# Patient Record
Sex: FEMALE | Race: WHITE | NOT HISPANIC OR LATINO | Employment: FULL TIME | URBAN - METROPOLITAN AREA
[De-identification: names, ages, dates, MRNs, and addresses within clinical notes are randomized per-mention and may not be internally consistent; named-entity substitution may affect disease eponyms.]

---

## 2017-03-20 ENCOUNTER — OFFICE VISIT (OUTPATIENT)
Dept: URGENT CARE | Facility: RETAIL CLINIC | Age: 27
End: 2017-03-20
Payer: COMMERCIAL

## 2017-03-20 VITALS
SYSTOLIC BLOOD PRESSURE: 143 MMHG | HEART RATE: 113 BPM | TEMPERATURE: 99.3 F | DIASTOLIC BLOOD PRESSURE: 96 MMHG | OXYGEN SATURATION: 100 %

## 2017-03-20 DIAGNOSIS — R03.0 ELEVATED BLOOD PRESSURE READING WITHOUT DIAGNOSIS OF HYPERTENSION: ICD-10-CM

## 2017-03-20 DIAGNOSIS — J02.9 ACUTE PHARYNGITIS, UNSPECIFIED ETIOLOGY: Primary | ICD-10-CM

## 2017-03-20 DIAGNOSIS — J02.0 STREP THROAT: ICD-10-CM

## 2017-03-20 LAB — S PYO AG THROAT QL IA.RAPID: ABNORMAL

## 2017-03-20 PROCEDURE — 99213 OFFICE O/P EST LOW 20 MIN: CPT | Performed by: NURSE PRACTITIONER

## 2017-03-20 PROCEDURE — 87880 STREP A ASSAY W/OPTIC: CPT | Mod: QW | Performed by: NURSE PRACTITIONER

## 2017-03-20 RX ORDER — PENICILLIN V POTASSIUM 500 MG/1
500 TABLET, FILM COATED ORAL 3 TIMES DAILY
Qty: 30 TABLET | Refills: 0 | Status: SHIPPED | OUTPATIENT
Start: 2017-03-20 | End: 2017-08-07

## 2017-03-20 NOTE — PROGRESS NOTES
[facility] clinic note    SUBJECTIVE:  Neo Parra is a 26 year old female who presents to [facility] clinic with chief complaint of sore throat.    Onset of symptoms was 3 day(s) ago.    Course of illness: sudden onset and worsening.    Severity moderate  Course of illness:  Current and Associated symptoms: ear pain/jaw, sore throat, post nasal drainage, hoarse voice, headache, myalgias and malaise  Treatment measures tried at home include OTC meds.  Predisposing factors include None.    Current Outpatient Prescriptions   Medication     penicillin V potassium (VEETID) 500 MG tablet     No current facility-administered medications for this visit.      PAST MEDICAL HISTORY:   Past Medical History   Diagnosis Date     NO ACTIVE PROBLEMS        PAST SURGICAL HISTORY:   Past Surgical History   Procedure Laterality Date     Hc create eardrum opening,gen anesth  1994     P.E. Tubes x 2       FAMILY HISTORY:   Family History   Problem Relation Age of Onset     Hypertension Mother        SOCIAL HISTORY:   Social History   Substance Use Topics     Smoking status: Never Smoker     Smokeless tobacco: Never Used     Alcohol use No       ROS:  Review of systems negative except as stated above.    OBJECTIVE:   Vitals:    03/20/17 1729   BP: (!) 143/96   Pulse: 113   Temp: 99.3  F (37.4  C)   TempSrc: Oral   SpO2: 100%     GENERAL APPEARANCE: alert, mild distress, moderate distress and cooperative  EYES: EOMI,  PERRL, conjunctiva clear  HENT: ear canals and TM's normal.  Nose normal.  Pharynx slightly erythematous with tonsilar hypertrophy noted.  NECK: bilateral anterior cervical adenopathy  RESP: lungs clear to auscultation - no rales, rhonchi or wheezes  CV: regular rates and rhythm, normal S1 S2, no murmur noted  ABDOMEN:  soft, nontender, no HSM or masses and bowel sounds normal  SKIN: no suspicious lesions or rashes    Rapid Strep test is positive    ASSESSMENT:     Acute  pharyngitis, unspecified etiology  Elevated blood pressure reading without diagnosis of hypertension  Strep throat      PLAN:   Outpatient Encounter Prescriptions as of 3/20/2017   Medication Sig Dispense Refill     penicillin V potassium (VEETID) 500 MG tablet Take 1 tablet (500 mg) by mouth 3 times daily 30 tablet 0     [DISCONTINUED] amoxicillin (AMOXIL) 500 MG capsule Take 1 capsule (500 mg) by mouth 3 times daily 30 capsule 0     [DISCONTINUED] norethindrone (MICRONOR) 0.35 MG per tablet Take 1 tablet (0.35 mg) by mouth daily 84 tablet 3     No facility-administered encounter medications on file as of 3/20/2017.      Blood Pressure reviewed & discussed, Neo Parra advised to F/U with PCP.  If not improving Follow up at:  [facility] [phone]  Encourage good hydration (mainly water), may drink tea /c honey, warm chicken broth to sooth throat.  Soft foods may be preferred for several days.  Symptomatic treatment with warm Na+ H2O gargles, and OTC meds as needed.   Will be contagious for 24 hours after starting antibiotic & should stay out of public settings.  The goal to minimize exposure to other people.  When given antibiotics follow the full treatment your health care provider recommends. (Finish medications even if feeling better).  Toothbrush should be replaced after 24 hours of being on antibiotic.  Also, wash anything that your mouth has been in contact with recently (water & coffee cups, etc.)    Rest as needed.  Follow-up with primary care provider if not improving or continues to have temps, greater than 48 hours after starting antibiotics.    If difficulty breathing or swallowing be seen in the ED immediately.    Kamron QUIÑONEZ, MSN, Family NP-C  [facility]

## 2017-03-20 NOTE — MR AVS SNAPSHOT
After Visit Summary   3/20/2017    Neo Parra    MRN: [id number]           Patient Information     Date Of Birth          1990        Visit Information        Provider Department      3/20/2017 5:30 PM Kamron Barnett APRN [facility]        Today's Diagnoses     Acute pharyngitis, unspecified etiology    -  1    Elevated blood pressure reading without diagnosis of hypertension        Strep throat           Follow-ups after your visit        Who to contact     You can reach your care team any time of the day by calling [phone].  Notification of test results:  If you have an abnormal lab result, we will notify you by phone as soon as possible.         Additional Information About Your Visit        MyChart Information     [employer] gives you secure access to your electronic health record. If you see a primary care provider, you can also send messages to your care team and make appointments. If you have questions, please call your primary care clinic.  If you do not have a primary care provider, please call [phone] and they will assist you.        Care EveryWhere ID     This is your Care EveryWhere ID. This could be used by other organizations to access your [place] medical records  [id number]        Your Vitals Were     Pulse Temperature Pulse Oximetry             113 99.3  F (37.4  C) (Oral) 100%          Blood Pressure from Last 3 Encounters:   03/20/17 (!) 143/96   07/12/16 116/84   04/28/16 145/90    Weight from Last 3 Encounters:   07/12/16 168 lb (76.2 kg)   04/19/16 196 lb (88.9 kg)   04/12/16 192 lb (87.1 kg)              We Performed the Following     RAPID STREP SCREEN          Today's Medication Changes          These changes are accurate as of: 3/20/17  5:53 PM.  If you have any questions, ask your nurse or doctor.               Start taking these medicines.        Dose/Directions    penicillin V potassium 500 MG tablet   Commonly known as:   VEETID   Used for:  Strep throat        Dose:  500 mg   Take 1 tablet (500 mg) by mouth 3 times daily   Quantity:  30 tablet   Refills:  0            Where to get your medicines      These medications were sent to [place], [place] - [address]  [address] , [facility] [address]     Phone:  [phone]     penicillin V potassium 500 MG tablet                Primary Care Provider Office Phone # Fax #    Melanie Lauren Bartlett [phone]       [place] [address] [place] [address]        Thank you!     Thank you for choosing [facility]  for your care. Our goal is always to provide you with excellent care. Hearing back from our patients is one way we can continue to improve our services. Please take a few minutes to complete the written survey that you may receive in the mail after your visit with us. Thank you!             Your Updated Medication List - Protect others around you: Learn how to safely use, store and throw away your medicines at www.disposemymeds.org.          This list is accurate as of: 3/20/17  5:53 PM.  Always use your most recent med list.                   Brand Name Dispense Instructions for use    penicillin V potassium 500 MG tablet    VEETID    30 tablet    Take 1 tablet (500 mg) by mouth 3 times daily

## 2017-03-20 NOTE — NURSING NOTE
"Chief Complaint   Patient presents with     Pharyngitis     x 3 days       Initial BP (!) 143/96  Pulse 113  Temp 99.3  F (37.4  C) (Oral)  SpO2 100% Estimated body mass index is 27.96 kg/(m^2) as calculated from the following:    Height as of 4/12/16: 5' 5\" (1.651 m).    Weight as of 7/12/16: 168 lb (76.2 kg).  Medication Reconciliation: complete   Madison Wyatt      "

## 2017-08-07 ENCOUNTER — OFFICE VISIT (OUTPATIENT)
Dept: FAMILY MEDICINE | Facility: CLINIC | Age: 27
End: 2017-08-07
Payer: COMMERCIAL

## 2017-08-07 VITALS
SYSTOLIC BLOOD PRESSURE: 120 MMHG | TEMPERATURE: 98.9 F | BODY MASS INDEX: 29.85 KG/M2 | DIASTOLIC BLOOD PRESSURE: 84 MMHG | HEART RATE: 132 BPM | WEIGHT: 179.4 LBS | OXYGEN SATURATION: 99 %

## 2017-08-07 DIAGNOSIS — R51.9 SCALP PAIN: Primary | ICD-10-CM

## 2017-08-07 PROCEDURE — 99213 OFFICE O/P EST LOW 20 MIN: CPT | Performed by: FAMILY MEDICINE

## 2017-08-07 ASSESSMENT — PAIN SCALES - GENERAL: PAINLEVEL: NO PAIN (0)

## 2017-08-07 NOTE — PROGRESS NOTES
SUBJECTIVE:                                                    S:  Neo Parra is a 26 year old female who presents to the clinic complaining of right sided temporal pain. The patient states that she has been experiencing sharp, stabbing pains in the right temporal area intermittently for 2 weeks. She says that this occurs randomly, and resolves 5-10 seconds after onset. The pain always occurs in the same spot, just into her hair line on the right frontal area. She rates it 7/10. She says that this pain does not develop into a headache. Patient denies any weakness, numbness, tingling, shooting pains, nausea, vomiting, slurred speech, confusion, visual changes, or other associated symptoms. Patient wears corrective lenses and last followed up with her eye doctor 2 years ago.     Problem list and histories reviewed & adjusted, as indicated.  Additional history: as documented    Patient Active Problem List   Diagnosis     CARDIOVASCULAR SCREENING; LDL GOAL LESS THAN 160     Postpartum care and examination of lactating mother     Anemia due to blood loss, acute     Past Surgical History:   Procedure Laterality Date     HC CREATE EARDRUM OPENING,GEN ANESTH  1994    P.E. Tubes x 2       Social History   Substance Use Topics     Smoking status: Never Smoker     Smokeless tobacco: Never Used     Alcohol use No     Family History   Problem Relation Age of Onset     Hypertension Mother          No current outpatient prescriptions on file.     Allergies   Allergen Reactions     Cephalosporins Hives     rash reaction from SUPRAX         Reviewed and updated as needed this visit by clinical staffTobacco  Allergies  Meds  Med Hx  Surg Hx  Fam Hx  Soc Hx      Reviewed and updated as needed this visit by Provider         ROS:  All other ROS reviewed and are negative or non-contributory except as stated in HPI.    OBJECTIVE:     /84  Pulse 132  Temp 98.9  F (37.2  C) (Temporal)  Wt 179 lb 6.4 oz (81.4 kg)  LMP  07/17/2017 (Approximate)  SpO2 99%  Breastfeeding? No  BMI 29.85 kg/m2  Body mass index is 29.85 kg/(m^2).   Vitals noted.  Patient alert, oriented, and in no acute distress.  SKIN normal. No visible scalp lesions, bruising, lumps/bumps, or other abnormalities in area of concern.   Eyes:  PERRLA, EOMI.  Fundi normal bilaterally.  Ears:  Canals clear, TM's nl bilaterally.  No erythema or fluid.  Oral:  Oropharynx nl without erythema, exudate, mass or other lesions.  Neck:  Supple without lymphadenopathy, JVD or masses.  CV:  RRR without murmur.  Respiratory:  Lungs clear to auscultation bilaterally.  Neuro exam:  CNII-XII intact.  Strength and sensation normal and symmetric in bilateral upper extremities and lower extremities.  Gait normal, including heel walk, toe walk and tandem.  No pronator drift.  Finger to nose testing with eyes closed is normal.  Normal rapid alternating movements.    Diagnostic Test Results:  No orders of the defined types were placed in this encounter.      ASSESSMENT:       ICD-10-CM    1. Scalp pain R51        PLAN:     Discussed with the patient that it is very reassuring that her neurological exam is normal, and she is not experiencing any other symptoms. Due to the quick onset and resolution of the pain, I think this is likely a nerve type pain. Encouraged the patient to go in and have an eye exam. If her eye exam is normal, I recommend that she monitor her symptoms. If she begins to experience visual changes, motor/ sensory/ coordination changes, sudden onset of emesis, or other worsening symptoms, she will notify me and I will set her up with a head CT or MRI.     This document serves as a record of services personally performed by Melanie Bartlett MD. It was created on their behalf by Shraddha Reynolds, a trained medical scribe. The creation of this record is based on the provider's personal observations and the statements of the patient. This document has been checked and  approved by the attending provider.    Melanie Bartlett MD  [facility]

## 2017-08-07 NOTE — NURSING NOTE
"Chief Complaint   Patient presents with     Headache     sharp,shooting pain top and side of head on right side 3 weeks       Initial /84  Pulse 132  Temp 98.9  F (37.2  C) (Temporal)  Wt 179 lb 6.4 oz (81.4 kg)  LMP 07/17/2017 (Approximate)  SpO2 99%  Breastfeeding? No  BMI 29.85 kg/m2 Estimated body mass index is 29.85 kg/(m^2) as calculated from the following:    Height as of 4/12/16: 5' 5\" (1.651 m).    Weight as of this encounter: 179 lb 6.4 oz (81.4 kg).  Medication Reconciliation: complete   Eva Smith CMA    "

## 2017-08-07 NOTE — MR AVS SNAPSHOT
After Visit Summary   8/7/2017    Neo Parra    MRN: [id number]           Patient Information     Date Of Birth          1990        Visit Information        Provider Department      8/7/2017 4:00 PM Melanie Bartlett MD [facility]        Today's Diagnoses     Scalp pain    -  1       Follow-ups after your visit        Who to contact     If you have questions or need follow up information about today's clinic visit or your schedule please contact [facility] directly at [phone].  Normal or non-critical lab and imaging results will be communicated to you by [employer], letter or phone within 4 business days after the clinic has received the results. If you do not hear from us within 7 days, please contact the clinic through [employer] or phone. If you have a critical or abnormal lab result, we will notify you by phone as soon as possible.  Submit refill requests through [employer] or call your pharmacy and they will forward the refill request to us. Please allow 3 business days for your refill to be completed.          Additional Information About Your Visit        MyChart Information     [employer] gives you secure access to your electronic health record. If you see a primary care provider, you can also send messages to your care team and make appointments. If you have questions, please call your primary care clinic.  If you do not have a primary care provider, please call [phone] and they will assist you.        Care EveryWhere ID     This is your Care EveryWhere ID. This could be used by other organizations to access your [place] medical records  [id number]        Your Vitals Were     Pulse Temperature Last Period Pulse Oximetry Breastfeeding? BMI (Body Mass Index)    132 98.9  F (37.2  C) (Temporal) 07/17/2017 (Approximate) 99% No 29.85 kg/m2       Blood Pressure from Last 3 Encounters:   08/07/17 120/84   03/20/17 (!) 143/96   07/12/16 116/84     Weight from Last 3 Encounters:   08/07/17 179 lb 6.4 oz (81.4 kg)   07/12/16 168 lb (76.2 kg)   04/19/16 196 lb (88.9 kg)              Today, you had the following     No orders found for display       Primary Care Provider Office Phone # Fax #    Melanie Lauren Bartlett [phone]       [facility] [address] [place] [address]        Equal Access to Services     AMANDO LOVE : Hadii aad ku hadasho Soomaali, waaxda luqadaha, qaybta kaalmada adeegyada, waxay idiin hayaan adeeg kharamikel lasupa duarte. So [facility] [phone].    ATENCIÓN: Si habla español, tiene a bright disposición servicios gratuitos de asistencia lingüística. LlSelect [facility] [place] [phone].    We comply with applicable federal civil rights laws and [place] laws. We do not discriminate on the basis of race, color, national origin, age, disability sex, sexual orientation or gender identity.            Thank you!     Thank you for choosing [facility]  for your care. Our goal is always to provide you with excellent care. Hearing back from our patients is one way we can continue to improve our services. Please take a few minutes to complete the written survey that you may receive in the mail after your visit with us. Thank you!             Your Updated Medication List - Protect others around you: Learn how to safely use, store and throw away your medicines at www.disposemymeds.org.      Notice  As of 8/7/2017  4:48 PM    You have not been prescribed any medications.

## 2017-12-11 ENCOUNTER — MYC MEDICAL ADVICE (OUTPATIENT)
Dept: FAMILY MEDICINE | Facility: CLINIC | Age: 27
End: 2017-12-11

## 2017-12-11 DIAGNOSIS — N91.4 SECONDARY OLIGOMENORRHEA: Primary | ICD-10-CM

## 2017-12-14 RX ORDER — MEDROXYPROGESTERONE ACETATE 10 MG
10 TABLET ORAL DAILY
Qty: 10 TABLET | Refills: 0 | Status: SHIPPED | OUTPATIENT
Start: 2017-12-14 | End: 2017-12-24

## 2018-01-06 ENCOUNTER — HEALTH MAINTENANCE LETTER (OUTPATIENT)
Age: 28
End: 2018-01-06

## 2018-01-16 ENCOUNTER — OFFICE VISIT (OUTPATIENT)
Dept: FAMILY MEDICINE | Facility: CLINIC | Age: 28
End: 2018-01-16
Payer: COMMERCIAL

## 2018-01-16 VITALS
SYSTOLIC BLOOD PRESSURE: 104 MMHG | HEART RATE: 70 BPM | WEIGHT: 174.6 LBS | BODY MASS INDEX: 28.06 KG/M2 | OXYGEN SATURATION: 100 % | TEMPERATURE: 98.4 F | DIASTOLIC BLOOD PRESSURE: 66 MMHG | HEIGHT: 66 IN

## 2018-01-16 DIAGNOSIS — Z23 ENCOUNTER FOR IMMUNIZATION: ICD-10-CM

## 2018-01-16 DIAGNOSIS — Z00.00 ENCOUNTER FOR ROUTINE ADULT HEALTH EXAMINATION WITHOUT ABNORMAL FINDINGS: Primary | ICD-10-CM

## 2018-01-16 DIAGNOSIS — N97.0 ANOVULATION: ICD-10-CM

## 2018-01-16 DIAGNOSIS — Z13.6 CARDIOVASCULAR SCREENING; LDL GOAL LESS THAN 160: ICD-10-CM

## 2018-01-16 DIAGNOSIS — Z12.4 SCREENING FOR MALIGNANT NEOPLASM OF CERVIX: ICD-10-CM

## 2018-01-16 LAB
CHOLEST SERPL-MCNC: 105 MG/DL
ESTRADIOL SERPL-MCNC: 94 PG/ML
HDLC SERPL-MCNC: 47 MG/DL
LDLC SERPL CALC-MCNC: 47 MG/DL
NONHDLC SERPL-MCNC: 58 MG/DL
PROGEST SERPL-MCNC: 0.9 NG/ML
PROLACTIN SERPL-MCNC: 15 UG/L (ref 3–27)
TRIGL SERPL-MCNC: 54 MG/DL
TSH SERPL DL<=0.005 MIU/L-ACNC: 1.33 MU/L (ref 0.4–4)

## 2018-01-16 PROCEDURE — 87591 N.GONORRHOEAE DNA AMP PROB: CPT | Performed by: FAMILY MEDICINE

## 2018-01-16 PROCEDURE — 84144 ASSAY OF PROGESTERONE: CPT | Performed by: FAMILY MEDICINE

## 2018-01-16 PROCEDURE — G0145 SCR C/V CYTO,THINLAYER,RESCR: HCPCS | Performed by: FAMILY MEDICINE

## 2018-01-16 PROCEDURE — 82670 ASSAY OF TOTAL ESTRADIOL: CPT | Performed by: FAMILY MEDICINE

## 2018-01-16 PROCEDURE — 99213 OFFICE O/P EST LOW 20 MIN: CPT | Mod: 25 | Performed by: FAMILY MEDICINE

## 2018-01-16 PROCEDURE — 90471 IMMUNIZATION ADMIN: CPT | Performed by: FAMILY MEDICINE

## 2018-01-16 PROCEDURE — 84146 ASSAY OF PROLACTIN: CPT | Performed by: FAMILY MEDICINE

## 2018-01-16 PROCEDURE — 87491 CHLMYD TRACH DNA AMP PROBE: CPT | Performed by: FAMILY MEDICINE

## 2018-01-16 PROCEDURE — 90632 HEPA VACCINE ADULT IM: CPT | Performed by: FAMILY MEDICINE

## 2018-01-16 PROCEDURE — 80061 LIPID PANEL: CPT | Performed by: FAMILY MEDICINE

## 2018-01-16 PROCEDURE — 36415 COLL VENOUS BLD VENIPUNCTURE: CPT | Performed by: FAMILY MEDICINE

## 2018-01-16 PROCEDURE — 84443 ASSAY THYROID STIM HORMONE: CPT | Performed by: FAMILY MEDICINE

## 2018-01-16 PROCEDURE — 99395 PREV VISIT EST AGE 18-39: CPT | Mod: 25 | Performed by: FAMILY MEDICINE

## 2018-01-16 ASSESSMENT — PAIN SCALES - GENERAL: PAINLEVEL: NO PAIN (0)

## 2018-01-16 NOTE — MR AVS SNAPSHOT
After Visit Summary   1/16/2018    Neo Parra    MRN: [id number]           Patient Information     Date Of Birth          1990        Visit Information        Provider Department      1/16/2018 1:00 PM Melanie Bartlett MD [facility]        Today's Diagnoses     Encounter for routine adult health examination without abnormal findings    -  1    Screening for malignant neoplasm of cervix        Anovulation        Need for HPV vaccine        Need for prophylactic vaccination and inoculation against influenza        CARDIOVASCULAR SCREENING; LDL GOAL LESS THAN 160        Encounter for immunization          Care Instructions      Preventive Health Recommendations  Female Ages 18 to 25     Yearly exam:     See your health care provider every year in order to  o Review health changes.   o Discuss preventive care.    o Review your medicines if your doctor has prescribed any.      You should be tested each year for STDs (sexually transmitted diseases).       After age 20, talk to your provider about how often you should have cholesterol testing.      Starting at age 21, get a Pap test every three years. If you have an abnormal result, your doctor may have you test more often.      If you are at risk for diabetes, you should have a diabetes test (fasting glucose).     Shots:     Get a flu shot each year.     Get a tetanus shot every 10 years.     Consider getting the shot (vaccine) that prevents cervical cancer (Gardasil).    Nutrition:     Eat at least 5 servings of fruits and vegetables each day.    Eat whole-grain bread, whole-wheat pasta and brown rice instead of white grains and rice.    Talk to your provider about Calcium and Vitamin D.     Lifestyle    Exercise at least 150 minutes a week each week (30 minutes a day, 5 days a week). This will help you control your weight and prevent disease.    Limit alcohol to one drink per day.    No smoking.     Wear sunscreen to  "prevent skin cancer.    See your dentist every six months for an exam and cleaning.          Follow-ups after your visit        Who to contact     If you have questions or need follow up information about today's clinic visit or your schedule please contact [facility] directly at [phone].  Normal or non-critical lab and imaging results will be communicated to you by MyChart, letter or phone within 4 business days after the clinic has received the results. If you do not hear from us within 7 days, please contact the clinic through [employer] or phone. If you have a critical or abnormal lab result, we will notify you by phone as soon as possible.  Submit refill requests through [employer] or call your pharmacy and they will forward the refill request to us. Please allow 3 business days for your refill to be completed.          Additional Information About Your Visit        MyChart Information     [employer] gives you secure access to your electronic health record. If you see a primary care provider, you can also send messages to your care team and make appointments. If you have questions, please call your primary care clinic.  If you do not have a primary care provider, please call [phone] and they will assist you.        Care EveryWhere ID     This is your Care EveryWhere ID. This could be used by other organizations to access your [place] medical records  [id number]        Your Vitals Were     Pulse Temperature Height Last Period Pulse Oximetry Breastfeeding?    70 98.4  F (36.9  C) (Temporal) 5' 6\" (1.676 m) 12/28/2017 (Exact Date) 100% No    BMI (Body Mass Index)                   28.18 kg/m2            Blood Pressure from Last 3 Encounters:   01/16/18 104/66   08/07/17 120/84   03/20/17 (!) 143/96    Weight from Last 3 Encounters:   01/16/18 174 lb 9.6 oz (79.2 kg)   08/07/17 179 lb 6.4 oz (81.4 kg)   07/12/16 168 lb (76.2 kg)              We Performed the Following     ADMIN 1st VACCINE     " Estradiol     HEPATITIS A VACCINE (ADULT)     Lipid panel reflex to direct LDL Fasting     Pap imaged thin layer screen reflex to HPV if ASCUS - recommend age 25 - 29     Progesterone     Prolactin     TSH with free T4 reflex        Primary Care Provider Office Phone # Fax #    Melanie Lauren Bartlett [phone] [address] [place] [address]        Equal Access to Services     [facility]: Hadii aad ku hadasho Soomaali, waaxda luqadaha, qaybta kaalmada adeegyada, waxay idiin hayaan adeeg kharash la'aan . So [facility] [phone].    ATENCIÓN: Si habla español, tiene a bright disposición servicios gratuitos de asistencia lingüística. Llame al [phone].    We comply with applicable federal civil rights laws and [place] laws. We do not discriminate on the basis of race, color, national origin, age, disability, sex, sexual orientation, or gender identity.            Thank you!     Thank you for choosing [facility]  for your care. Our goal is always to provide you with excellent care. Hearing back from our patients is one way we can continue to improve our services. Please take a few minutes to complete the written survey that you may receive in the mail after your visit with us. Thank you!             Your Updated Medication List - Protect others around you: Learn how to safely use, store and throw away your medicines at www.disposemymeds.org.      Notice  As of 1/16/2018  1:43 PM    You have not been prescribed any medications.

## 2018-01-16 NOTE — NURSING NOTE
Screening Questionnaire for Adult Immunization    Are you sick today?   No   Do you have allergies to medications, food, a vaccine component or latex?   Yes   Have you ever had a serious reaction after receiving a vaccination?   No   Do you have a long-term health problem with heart disease, lung disease, asthma, kidney disease, metabolic disease (e.g. diabetes), anemia, or other blood disorder?   No   Do you have cancer, leukemia, HIV/AIDS, or any other immune system problem?   No   In the past 3 months, have you taken medications that affect  your immune system, such as prednisone, other steroids, or anticancer drugs; drugs for the treatment of rheumatoid arthritis, Crohn s disease, or psoriasis; or have you had radiation treatments?   No   Have you had a seizure, or a brain or other nervous system problem?   No   During the past year, have you received a transfusion of blood or blood     products, or been given immune (gamma) globulin or antiviral drug?   No   For women: Are you pregnant or is there a chance you could become        pregnant during the next month?   No   Have you received any vaccinations in the past 4 weeks?   No     Immunization questionnaire was positive for at least one answer.  Notified provider.        Per orders of Dr. Bartlett, injection of Hep A given by Eva Smith. Patient instructed to remain in clinic for 15 minutes afterwards, and to report any adverse reaction to me immediately.       Screening performed by Eva Smith on 1/16/2018 at 1:42 PM.

## 2018-01-16 NOTE — NURSING NOTE
"Chief Complaint   Patient presents with     Physical       Initial /76  Pulse 125  Temp 98.4  F (36.9  C) (Temporal)  Ht 5' 6\" (1.676 m)  Wt 174 lb 9.6 oz (79.2 kg)  LMP 12/28/2017 (Exact Date)  SpO2 100%  Breastfeeding? No  BMI 28.18 kg/m2 Estimated body mass index is 28.18 kg/(m^2) as calculated from the following:    Height as of this encounter: 5' 6\" (1.676 m).    Weight as of this encounter: 174 lb 9.6 oz (79.2 kg).  Medication Reconciliation: complete   Eva Smith CMA    "

## 2018-01-16 NOTE — PROGRESS NOTES
SUBJECTIVE:   CC: Neo Parra is an 27 year old woman who presents for preventive health visit.     Physical   Annual:     Getting at least 3 servings of Calcium per day::  Yes    Bi-annual eye exam::  Yes    Dental care twice a year::  Yes    Sleep apnea or symptoms of sleep apnea::  None    Diet::  Regular (no restrictions)    Frequency of exercise::  4-5 days/week    Duration of exercise::  15-30 minutes    Taking medications regularly::  Yes    Medication side effects::  Not applicable    Additional concerns today::  YES              Today's PHQ-2 Score:   PHQ-2 ( 1999 [employer]) 1/9/2018   Q1: Little interest or pleasure in doing things 0   Q2: Feeling down, depressed or hopeless 0   PHQ-2 Score 0   Q1: Little interest or pleasure in doing things Not at all   Q2: Feeling down, depressed or hopeless Not at all   PHQ-2 Score 0     Answers for HPI/ROS submitted by the patient on 1/9/2018   PHQ-2 Score: 0    Abuse: Current or Past(Physical, Sexual or Emotional)- No  Do you feel safe in your environment - Yes    Social History   Substance Use Topics     Smoking status: Never Smoker     Smokeless tobacco: Never Used     Alcohol use No     Alcohol Use 1/9/2018   If you drink alcohol, do you typically have greater than 3 drinks per day OR greater than 7 drinks per week?   No   No flowsheet data found.      Reviewed orders with patient.  Reviewed health maintenance and updated orders accordingly - Yes      Mammogram not appropriate for this patient based on age.    Pertinent mammograms are reviewed under the imaging tab.  History of abnormal Pap smear: NO - age 21-29 PAP every 3 years recommended    Reviewed and updated as needed this visit by clinical staff  Tobacco  Allergies  Meds  Med Hx  Surg Hx  Fam Hx  Soc Hx        Reviewed and updated as needed this visit by Provider  Tobacco  Med Hx  Surg Hx  Fam Hx  Soc Hx         Review of Systems  C: NEGATIVE for fever, chills, unexplained change in weight.   "She did put some weight back on after stopping breastfeeding.    I: NEGATIVE for worrisome rashes, moles or lesions  E: NEGATIVE for vision changes or irritation, wears glasses.   ENT: NEGATIVE for ear, mouth and throat problems  R: NEGATIVE for significant cough or SOB  B: NEGATIVE for masses, tenderness or discharge  CV: NEGATIVE for chest pain, palpitations or peripheral edema  GI: NEGATIVE for nausea, abdominal pain, heartburn, or change in bowel habits  : NEGATIVE for unusual urinary or vaginal symptoms. Periods are very irregular.  They were spacing 40-60 days ever since her dtr was born.  She did get one on October 11, then didn't have one again and called in December and I started her on a course of Provera.  After finishing the provera, she got a period 4 days later, on 12/24. It lasted 4 days, now hasn't had another one, but it's only been 23 days.  She would like to get pregnant again.  She has never really had regular periods, but they weren't this infrequent before conceiving her dtr.  She has not used contraception since her pregnancy.  She stopped breastfeeding her daughter in late March 2017.  She has been testing for ovulation for 2 full months (long cycle) and no positive indicator for ovulation.    M: NEGATIVE for significant arthralgias or myalgia  N: NEGATIVE for weakness, dizziness or paresthesias  P: NEGATIVE for changes in mood or affect     OBJECTIVE:   /66  Pulse 70  Temp 98.4  F (36.9  C) (Temporal)  Ht 5' 6\" (1.676 m)  Wt 174 lb 9.6 oz (79.2 kg)  LMP 12/28/2017 (Exact Date)  SpO2 100%  Breastfeeding? No  BMI 28.18 kg/m2  Physical Exam     Her initial BP was 140/76 with a pulse of 125, which slowed quickly after sitting down. Recheck after appointment was 104/66 with a pulse of 70.    GENERAL: healthy, alert and no distress  EYES: Eyes grossly normal to inspection, PERRL and conjunctivae and sclerae normal  HENT: ear canals and TM's normal,  Except some chronic appearing " scarring on the bilateral TMs.   Nose and mouth without ulcers or lesions, large but uninflamed tonsils.    NECK: no adenopathy, no asymmetry, masses, or scars and thyroid generous but symmetric. No nodules.    RESP: lungs clear to auscultation - no rales, rhonchi or wheezes  BREAST: normal without masses, tenderness or nipple discharge and no palpable axillary masses or adenopathy  CV: tachycardic but regular rhythm, normal S1 S2, no S3 or S4, no murmur, click or rub, no peripheral edema and peripheral pulses strong  ABDOMEN: soft, nontender, no hepatosplenomegaly, no masses and bowel sounds normal  Vaginal exam reveals normal external and internal genitalia.  Cervix is closed, long and thick.  No lesions or abnormalities seen.  genprobe and pap collected.  Bimanual exam reveals a nontender, nongravid uterus with no CMT.  No adnexal masses or tenderness.     MS: no gross musculoskeletal defects noted, no edema  SKIN: no suspicious lesions or rashes, small oval brown flat macule between the breasts.    NEURO: Normal strength and tone, mentation intact and speech normal  PSYCH: mentation appears normal, affect normal/bright    Orders Placed This Encounter   Procedures     Pap imaged thin layer screen reflex to HPV if ASCUS - recommend age 25 - 29     Lipid panel reflex to direct LDL Fasting     TSH with free T4 reflex     Estradiol     Progesterone     Prolactin        ASSESSMENT/PLAN:       ICD-10-CM    1. Encounter for routine adult health examination without abnormal findings Z00.00 NEISSERIA GONORRHOEA PCR     CHLAMYDIA TRACHOMATIS PCR   2. Screening for malignant neoplasm of cervix Z12.4 Pap imaged thin layer screen reflex to HPV if ASCUS - recommend age 25 - 29   3. Anovulation N97.0 TSH with free T4 reflex     Estradiol     Progesterone     Prolactin   4. CARDIOVASCULAR SCREENING; LDL GOAL LESS THAN 160 Z13.6 Lipid panel reflex to direct LDL Fasting   5. Encounter for immunization Z23 HEPATITIS A VACCINE  "(ADULT)     ADMIN 1st VACCINE       COUNSELING:  Reviewed preventive health counseling, as reflected in patient instructions       Regular exercise       Healthy diet/nutrition       Immunizations    Vaccinated for: Hepatitis A and Declined: Human Papillomavirus and Influenza             Family planning    Discussed the likelihood that she is anovulatory.  Will get some labs today to check her thyroid and hormone levels and notify with results. As long as these are all ok, will wait and see if she gets another period again in the next 1-2 weeks. If not, we could repeat her course of provera, then consider clomid to induce ovulation.    If her labs are abnormal will need to address or consider referral to infertility clinic.      reports that she has never smoked. She has never used smokeless tobacco.    Estimated body mass index is 28.18 kg/(m^2) as calculated from the following:    Height as of this encounter: 5' 6\" (1.676 m).    Weight as of this encounter: 174 lb 9.6 oz (79.2 kg).   Weight management plan: Discussed healthy diet and exercise guidelines and patient will follow up in 12 months in clinic to re-evaluate.    Counseling Resources:  ATP IV Guidelines  Pooled Cohorts Equation Calculator  Breast Cancer Risk Calculator  FRAX Risk Assessment  ICSI Preventive Guidelines  Dietary Guidelines for Americans, 2010  USDA's MyPlate  ASA Prophylaxis  Lung CA Screening    Melanie Bartlett MD  [facility]  "

## 2018-01-17 LAB
C TRACH DNA SPEC QL NAA+PROBE: NEGATIVE
N GONORRHOEA DNA SPEC QL NAA+PROBE: NEGATIVE
SPECIMEN SOURCE: NORMAL
SPECIMEN SOURCE: NORMAL

## 2018-01-18 LAB
COPATH REPORT: NORMAL
PAP: NORMAL

## 2018-01-19 ENCOUNTER — MYC MEDICAL ADVICE (OUTPATIENT)
Dept: FAMILY MEDICINE | Facility: CLINIC | Age: 28
End: 2018-01-19

## 2018-01-19 DIAGNOSIS — N97.0 ANOVULATION: Primary | ICD-10-CM

## 2018-01-19 NOTE — PROGRESS NOTES
"Aylana, your hormone levels are all good. Your progesterone level is hard to interpret because it varies throughout the cycle, but overall it's fine.  Your estrogen and thyroid levels are perfect.    Your cholesterol is fine, your \"good\" HDL cholesterol is a little low and would improve with more regular aerobic exercise. But overall the numbers are good.   Melanie Bartlett MD"

## 2018-01-22 NOTE — TELEPHONE ENCOUNTER
Last visit notes:  As long as these are all ok, will wait and see if she gets another period again in the next 1-2 weeks. If not, we could repeat her course of provera, then consider clomid to induce ovulation.    If her labs are abnormal will need to address or consider referral to infertility clinic.     Please advise  Eva Smith CMA

## 2018-01-29 RX ORDER — CLOMIPHENE CITRATE 50 MG/1
50 TABLET ORAL DAILY
Qty: 5 TABLET | Refills: 2 | Status: SHIPPED | OUTPATIENT
Start: 2018-01-29 | End: 2018-02-19

## 2018-01-29 RX ORDER — MEDROXYPROGESTERONE ACETATE 10 MG
10 TABLET ORAL DAILY
Qty: 10 TABLET | Refills: 0 | Status: SHIPPED | OUTPATIENT
Start: 2018-01-29 | End: 2022-11-14

## 2018-01-29 NOTE — TELEPHONE ENCOUNTER
Spoke with Melanie Bartlett MD about patient's reply mychart message. MD will reply back to patient about her recommendations.  Eva Smith CMA

## 2018-02-13 ENCOUNTER — MYC MEDICAL ADVICE (OUTPATIENT)
Dept: FAMILY MEDICINE | Facility: CLINIC | Age: 28
End: 2018-02-13

## 2018-02-14 ENCOUNTER — ALLIED HEALTH/NURSE VISIT (OUTPATIENT)
Dept: FAMILY MEDICINE | Facility: OTHER | Age: 28
End: 2018-02-14
Payer: COMMERCIAL

## 2018-02-14 VITALS
HEIGHT: 65 IN | SYSTOLIC BLOOD PRESSURE: 130 MMHG | WEIGHT: 173.2 LBS | DIASTOLIC BLOOD PRESSURE: 78 MMHG | BODY MASS INDEX: 28.86 KG/M2

## 2018-02-14 DIAGNOSIS — N91.2 AMENORRHEA: Primary | ICD-10-CM

## 2018-02-14 LAB — BETA HCG QUAL IFA URINE: POSITIVE

## 2018-02-14 PROCEDURE — 84703 CHORIONIC GONADOTROPIN ASSAY: CPT | Performed by: FAMILY MEDICINE

## 2018-02-14 PROCEDURE — 99207 ZZC NO CHARGE NURSE ONLY: CPT

## 2018-02-14 RX ORDER — PRENATAL VIT/IRON FUM/FOLIC AC 27MG-0.8MG
1 TABLET ORAL DAILY
COMMUNITY
End: 2018-12-04

## 2018-02-14 NOTE — NURSING NOTE
Neo Parra is a 27 year old here today for a pregnancy test.  LMP: Patient's last menstrual period was 2017 (exact date).  Wt: 173 lbs 3.2 oz.    Symptoms include absence of menses and nausea &/or vomiting.    Willa informed of positive pregnancy test results. BEVERLY: 10/4/18 6 wk 6 days    Educational advice given: nutrition, smoking and drugs & alcohol.    Current medications reviewed: Yes    Previous pregnancy history remarkable for: none.    Plan: schedule appointment with OB Educator and/or OB class, follow-up appointment with Dr. Bartlett for pre-marvin care, take multivitamin or pre- vitamins and OB Education packet given.    She is to call back if she has any questions or concerns.  She is advised to notify a provider immediately if she experiences any severe cramping or abdominal pain or any vaginal bleeding.    Elijah Giron RN, BSN

## 2018-02-19 ENCOUNTER — PRENATAL OFFICE VISIT (OUTPATIENT)
Dept: FAMILY MEDICINE | Facility: CLINIC | Age: 28
End: 2018-02-19
Payer: COMMERCIAL

## 2018-02-19 VITALS — BODY MASS INDEX: 28.12 KG/M2 | HEIGHT: 66 IN | WEIGHT: 175 LBS

## 2018-02-19 DIAGNOSIS — Z32.01 PREGNANCY, CONFIRMED, NOT FIRST: Primary | ICD-10-CM

## 2018-02-19 LAB
ABO + RH BLD: NORMAL
ABO + RH BLD: NORMAL
ALBUMIN UR-MCNC: NEGATIVE MG/DL
APPEARANCE UR: CLEAR
BILIRUB UR QL STRIP: NEGATIVE
BLD GP AB SCN SERPL QL: NORMAL
BLOOD BANK CMNT PATIENT-IMP: NORMAL
COLOR UR AUTO: COLORLESS
ERYTHROCYTE [DISTWIDTH] IN BLOOD BY AUTOMATED COUNT: 13.1 % (ref 10–15)
GLUCOSE UR STRIP-MCNC: NEGATIVE MG/DL
HCT VFR BLD AUTO: 43.6 % (ref 35–47)
HGB BLD-MCNC: 14.2 G/DL (ref 11.7–15.7)
HGB UR QL STRIP: NEGATIVE
KETONES UR STRIP-MCNC: NEGATIVE MG/DL
LEUKOCYTE ESTERASE UR QL STRIP: NEGATIVE
MCH RBC QN AUTO: 28.5 PG (ref 26.5–33)
MCHC RBC AUTO-ENTMCNC: 32.6 G/DL (ref 31.5–36.5)
MCV RBC AUTO: 88 FL (ref 78–100)
NITRATE UR QL: NEGATIVE
PH UR STRIP: 6 PH (ref 5–7)
PLATELET # BLD AUTO: 274 10E9/L (ref 150–450)
RBC # BLD AUTO: 4.98 10E12/L (ref 3.8–5.2)
SOURCE: ABNORMAL
SP GR UR STRIP: 1 (ref 1–1.03)
SPECIMEN EXP DATE BLD: NORMAL
UROBILINOGEN UR STRIP-MCNC: 0 MG/DL (ref 0–2)
WBC # BLD AUTO: 13.1 10E9/L (ref 4–11)

## 2018-02-19 PROCEDURE — 86762 RUBELLA ANTIBODY: CPT | Performed by: FAMILY MEDICINE

## 2018-02-19 PROCEDURE — 86780 TREPONEMA PALLIDUM: CPT | Performed by: FAMILY MEDICINE

## 2018-02-19 PROCEDURE — 85027 COMPLETE CBC AUTOMATED: CPT | Performed by: FAMILY MEDICINE

## 2018-02-19 PROCEDURE — 87389 HIV-1 AG W/HIV-1&-2 AB AG IA: CPT | Performed by: FAMILY MEDICINE

## 2018-02-19 PROCEDURE — 99207 ZZC NO CHARGE NURSE ONLY: CPT

## 2018-02-19 PROCEDURE — 87340 HEPATITIS B SURFACE AG IA: CPT | Performed by: FAMILY MEDICINE

## 2018-02-19 PROCEDURE — 86850 RBC ANTIBODY SCREEN: CPT | Performed by: FAMILY MEDICINE

## 2018-02-19 PROCEDURE — 81003 URINALYSIS AUTO W/O SCOPE: CPT | Performed by: FAMILY MEDICINE

## 2018-02-19 PROCEDURE — 86900 BLOOD TYPING SEROLOGIC ABO: CPT | Performed by: FAMILY MEDICINE

## 2018-02-19 PROCEDURE — 36415 COLL VENOUS BLD VENIPUNCTURE: CPT | Performed by: FAMILY MEDICINE

## 2018-02-19 PROCEDURE — 86901 BLOOD TYPING SEROLOGIC RH(D): CPT | Performed by: FAMILY MEDICINE

## 2018-02-19 NOTE — PROGRESS NOTES
1. Have you had chicken pox?   Yes    Genetic Screening    Has the patient, baby's father, or anyone in either family had:     1. Thalassemia and and MCV result less than 80?No   2.  Neural tube defect? No   3.  Congenital heart defect?No   4. Down's syndrome?No   5.  George-Sachs disease?No   6. Sickle Cell disease or trait?No   7. Hemophilia or other inherited problems of blood?No   8. Muscular dystropy?No   9.  Cystic fibrosis?No  10. Bartlett's Chorea?No  11. Mental Retardation or autism?  No         If yes, was the person tested for Fragile X?   12. Any other inherited genetic or chromosomal disorders?No  13. Metabolic disorders such as diabetes or PKU?No  14. A child born with defects not listed above?No  15. Recurrent pregnancy loss or stillbirth?No  16.  Has the patient had any medications/street drugs/alcohol since her last menstrual period?No  18.  Does the patient or baby's father have any other genetic risks. No    Tushar is a twin and his sister had a twin pregnancy.

## 2018-02-19 NOTE — MR AVS SNAPSHOT
After Visit Summary   2/19/2018    Neo Parra    MRN: [id number]           Patient Information     Date Of Birth          1990        Visit Information        Provider Department      2/19/2018 12:00 PM NL OB INTAKE [facility]        Today's Diagnoses     Pregnancy, confirmed, not first    -  1       Follow-ups after your visit        Your next 10 appointments already scheduled     Feb 23, 2018  1:45 PM CST   US OB < 14 WEEKS SINGLE with PHUS1   [facility] ([facility])    [address] [place] [id number] [phone]           Please bring a list of your medicines (including vitamins, minerals and over-the-counter drugs). Also, tell your doctor about any allergies you may have. Wear comfortable clothes and leave your valuables at home.  If you re less than 20 weeks drink four 8-ounce glasses of fluid an hour before your exam. If you need to empty your bladder before your exam, try to release only a little urine. Then, drink another glass of fluid.  You may have up to two family members in the exam room. If you bring a small child, an adult must be there to care for him or her.  Please call the Imaging Department at your exam site with any questions.            Mar 09, 2018 12:45 PM CST   New Prenatal with Melanie Bartlett MD   [facility]    [address] [address] [phone]              Future tests that were ordered for you today     Open Future Orders        Priority Expected Expires Ordered    US OB < 14 Weeks Single Routine 2/19/2018 2/19/2019 2/19/2018            Who to contact     If you have questions or need follow up information about today's clinic visit or your schedule please contact [facility] directly at [phone].  Normal or non-critical lab and imaging results will be communicated to you by MyChart, letter or  "phone within 4 business days after the clinic has received the results. If you do not hear from us within 7 days, please contact the clinic through [employer] or phone. If you have a critical or abnormal lab result, we will notify you by phone as soon as possible.  Submit refill requests through [employer] or call your pharmacy and they will forward the refill request to us. Please allow 3 business days for your refill to be completed.          Additional Information About Your Visit        [employer] Information     [employer] gives you secure access to your electronic health record. If you see a primary care provider, you can also send messages to your care team and make appointments. If you have questions, please call your primary care clinic.  If you do not have a primary care provider, please call [phone] and they will assist you.        Care EveryWhere ID     This is your Care EveryWhere ID. This could be used by other organizations to access your [facility] medical records  [id number]        Your Vitals Were     Height Last Period BMI (Body Mass Index)             5' 5.5\" (1.664 m) 12/28/2017 (Approximate) 28.68 kg/m2          Blood Pressure from Last 3 Encounters:   02/14/18 130/78   01/16/18 104/66   08/07/17 120/84    Weight from Last 3 Encounters:   02/19/18 175 lb (79.4 kg)   02/14/18 173 lb 3.2 oz (78.6 kg)   01/16/18 174 lb 9.6 oz (79.2 kg)              We Performed the Following     *UA reflex to Microscopic and Culture ([place]; [facility]; [facility]; [facility]; [facility]; [facility]; [place]; [place])     ABO/Rh type and screen     Anti treponema EIA     CBC WITH PLATELETS     HEPATITIS B SURFACE ANTIGEN     HIV Antigen Antibody Combo     Rubella Antibody IgG Quantitative          Today's Medication Changes          These changes are accurate as of 2/19/18 12:39 PM.  If you have any questions, ask your nurse or doctor.               Stop taking these medicines if you haven't " already. Please contact your care team if you have questions.     clomiPHENE 50 MG tablet   Commonly known as:  CLOMID                    Primary Care Provider Office Phone # Fax #    Melanie Lauren Bartlett [phone] [address] [place] [address]        Equal Access to Services     [facility] IVAN : Hadii aad ku hadasho Soomaali, waaxda luqadaha, qaybta kaalmada adeegyada, waxay hipolitoin haymichaeln xuan santanaraphaelmikel duarte. So [facility] [phone].    ATENCIÓN: Si habla español, tiene a bright disposición servicios gratuitos de asistencia lingüística. Llame al [phone].    We comply with applicable federal civil rights laws and [place] laws. We do not discriminate on the basis of race, color, national origin, age, disability, sex, sexual orientation, or gender identity.            Thank you!     Thank you for choosing [facility]  for your care. Our goal is always to provide you with excellent care. Hearing back from our patients is one way we can continue to improve our services. Please take a few minutes to complete the written survey that you may receive in the mail after your visit with us. Thank you!             Your Updated Medication List - Protect others around you: Learn how to safely use, store and throw away your medicines at www.disposemymeds.org.          This list is accurate as of 2/19/18 12:39 PM.  Always use your most recent med list.                   Brand Name Dispense Instructions for use Diagnosis    prenatal multivitamin plus iron 27-0.8 MG Tabs per tablet      Take 1 tablet by mouth daily

## 2018-02-20 LAB
HBV SURFACE AG SERPL QL IA: NONREACTIVE
HIV 1+2 AB+HIV1 P24 AG SERPL QL IA: NONREACTIVE
RUBV IGG SERPL IA-ACNC: 16 IU/ML
T PALLIDUM IGG+IGM SER QL: NEGATIVE

## 2018-02-23 ENCOUNTER — HOSPITAL ENCOUNTER (OUTPATIENT)
Dept: ULTRASOUND IMAGING | Facility: CLINIC | Age: 28
Discharge: HOME | End: 2018-02-23
Attending: FAMILY MEDICINE | Admitting: FAMILY MEDICINE
Payer: COMMERCIAL

## 2018-02-23 DIAGNOSIS — Z32.01 PREGNANCY, CONFIRMED, NOT FIRST: ICD-10-CM

## 2018-02-23 PROCEDURE — 76801 OB US < 14 WKS SINGLE FETUS: CPT

## 2018-02-27 ENCOUNTER — MYC MEDICAL ADVICE (OUTPATIENT)
Dept: FAMILY MEDICINE | Facility: CLINIC | Age: 28
End: 2018-02-27

## 2018-02-27 NOTE — TELEPHONE ENCOUNTER
Neo Parra is a 27 year old female who calls with vomiting.  Patient reports that she is currently 8w5d pregnant.  She started vomiting last night and vomited during the night.  She reports that this morning, she is starting to feel a little better.  She denies any fevers, diarrhea, no known exposure to the flu.       NURSING ASSESSMENT:  Description:  Vomiting.   Onset/duration:  Last night.   Precip. factors:  Pregnant.   Associated symptoms:  None.   Improves/worsens symptoms:  Starting to feel better this morning.   Pain scale (0-10)   1/10  LMP/preg/breast feedinw5d  Last exam/Treatment:  2018  Allergies:   Allergies   Allergen Reactions     Cephalosporins Hives     rash reaction from SUPRAX     NURSING PLAN: Nursing advice to patient .    RECOMMENDED DISPOSITION:  Patient reports feeling better this morning.  She was encouraged to continue with small amounts of fluids, slowly increase diet to crackers or dry toast..  Educated when to seek emergent care or follow up with clinic.   Will comply with recommendation: Yes  If further questions/concerns or if symptoms do not improve, worsen or new symptoms develop, call your PCP or [facility] as soon as possible.    Guideline used:  Vomiting  Telephone Triage Protocols for Nurses, Fifth Edition, Carmella Lui RN

## 2018-03-09 ENCOUNTER — PRENATAL OFFICE VISIT (OUTPATIENT)
Dept: FAMILY MEDICINE | Facility: CLINIC | Age: 28
End: 2018-03-09
Payer: COMMERCIAL

## 2018-03-09 VITALS
WEIGHT: 175 LBS | BODY MASS INDEX: 28.68 KG/M2 | SYSTOLIC BLOOD PRESSURE: 124 MMHG | OXYGEN SATURATION: 100 % | DIASTOLIC BLOOD PRESSURE: 68 MMHG | HEART RATE: 142 BPM | TEMPERATURE: 98.7 F

## 2018-03-09 DIAGNOSIS — Z34.80 SUPERVISION OF OTHER NORMAL PREGNANCY, ANTEPARTUM: Primary | ICD-10-CM

## 2018-03-09 PROCEDURE — 99207 ZZC FIRST OB VISIT: CPT | Performed by: FAMILY MEDICINE

## 2018-03-09 PROCEDURE — 87491 CHLMYD TRACH DNA AMP PROBE: CPT | Performed by: FAMILY MEDICINE

## 2018-03-09 PROCEDURE — 87591 N.GONORRHOEAE DNA AMP PROB: CPT | Performed by: FAMILY MEDICINE

## 2018-03-09 RX ORDER — PYRIDOXINE HCL (VITAMIN B6) 50 MG
TABLET ORAL
COMMUNITY
Start: 2018-03-09 | End: 2018-05-04

## 2018-03-09 ASSESSMENT — PAIN SCALES - GENERAL: PAINLEVEL: NO PAIN (0)

## 2018-03-09 NOTE — NURSING NOTE
"Chief Complaint   Patient presents with     Prenatal Care       Initial /68  Pulse 142  Temp 98.7  F (37.1  C) (Temporal)  Wt 175 lb (79.4 kg)  LMP 12/28/2017 (Approximate)  SpO2 100%  BMI 28.68 kg/m2 Estimated body mass index is 28.68 kg/(m^2) as calculated from the following:    Height as of 2/19/18: 5' 5.5\" (1.664 m).    Weight as of this encounter: 175 lb (79.4 kg).  Medication Reconciliation: complete   Eva Smith CMA    "

## 2018-03-09 NOTE — MR AVS SNAPSHOT
After Visit Summary   3/9/2018    Neo Parra    MRN: [id number]           Patient Information     Date Of Birth          1990        Visit Information        Provider Department      3/9/2018 12:45 PM Melanie Bartlett MD [facility]        Today's Diagnoses     Supervision of other normal pregnancy, antepartum    -  1       Follow-ups after your visit        Your next 10 appointments already scheduled     Apr 06, 2018  1:30 PM CDT   ESTABLISHED PRENATAL with Melanie Bartlett MD   [facility]    [address] [place] [address]   [phone]            May 04, 2018 11:30 AM CDT   ESTABLISHED PRENATAL with Melanie Bartlett MD   [facility] ([facility])    [address] [place] [address]   [phone]            Jun 01, 2018 11:30 AM CDT   ESTABLISHED PRENATAL with Melanie Bartlett MD   [facility] ([facility]    [address] [place] [address]   [phone]            Jul 06, 2018 11:30 AM CDT   ESTABLISHED PRENATAL with Melanie Bartlett MD   [facility]    [address] [place] [address]   [phone]            Aug 03, 2018 11:30 AM CDT   ESTABLISHED PRENATAL with Melanie Bartlett MD   [facility] ([facility])    [address] [place] [address]   [phone]            Aug 17, 2018 11:30 AM CDT   ESTABLISHED PRENATAL with Melanie Bartlett MD   [facility] ([facility])    [address] [place] [address]   [phone]            Aug 31, 2018 11:30 AM CDT   ESTABLISHED PRENATAL with Melanie Bartlett MD   [facility] ([facility])    [address] [place] [address]  [address]   [phone]            Sep 14, 2018 11:30 AM CDT   ESTABLISHED PRENATAL with Melanie Bartlett MD   [facility]    [address] [place] [address]   [phone]            Sep 21, 2018 11:30 AM CDT   ESTABLISHED PRENATAL with Melanie Bartlett MD   [facility] ([address] [address]   [phone]            Sep 28, 2018 11:30 AM CDT   ESTABLISHED PRENATAL with Melanie Bartlett MD   [facility] ([address] [address] [phone]              Who to contact     If you have questions or need follow up information about today's clinic visit or your schedule please contact [facility] directly at [phone].  Normal or non-critical lab and imaging results will be communicated to you by Merchant Viewhart, letter or phone within 4 business days after the clinic has received the results. If you do not hear from us within 7 days, please contact the clinic through Email Data Sourcet or phone. If you have a critical or abnormal lab result, we will notify you by phone as soon as possible.  Submit refill requests through Viewbix or call your pharmacy and they will forward the refill request to us. Please allow 3 business days for your refill to be completed.          Additional Information About Your Visit        Viewbix Information     Viewbix gives you secure access to your electronic health record. If you see a primary care provider, you can also send messages to your care team and make appointments. If you have questions, please call your primary care clinic.  If you do not have a primary care provider, please call [phone] and they will assist you.        Care EveryWhere ID     This is your Care EveryWhere ID. This could be used by other organizations to access your [place]  medical records  [id number]        Your Vitals Were     Pulse Temperature Last Period Pulse Oximetry BMI (Body Mass Index)       142 98.7  F (37.1  C) (Temporal) 12/28/2017 (Exact Date) 100% 28.68 kg/m2        Blood Pressure from Last 3 Encounters:   03/09/18 124/68   02/14/18 130/78   01/16/18 104/66    Weight from Last 3 Encounters:   03/09/18 175 lb (79.4 kg)   02/19/18 175 lb (79.4 kg)   02/14/18 173 lb 3.2 oz (78.6 kg)              We Performed the Following     CHLAMYDIA TRACHOMATIS PCR     NEISSERIA GONORRHOEA PCR          Today's Medication Changes          These changes are accurate as of 3/9/18  1:48 PM.  If you have any questions, ask your nurse or doctor.               Start taking these medicines.        Dose/Directions    B-6 50 MG Tabs   Used for:  Supervision of other normal pregnancy, antepartum   Started by:  Melanie Bartlett MD        25 mg bid prn   Refills:  0            Where to get your medicines      Some of these will need a paper prescription and others can be bought over the counter.  Ask your nurse if you have questions.     You don't need a prescription for these medications     B-6 50 MG Tabs                Primary Care Provider Office Phone # Fax #    Melanie Bartlett [phone] [address] [place] [address]        Equal Access to Services     [facility] Center[facility] AH: Hadii garett shields hadasho Sohakeemali, waaxda luqadaha, qaybta kaalmada chapitoda, wax gary ni . So [facility] [phone].    ATENCIÓN: Si habla español, tiene a bright disposición servicios gratuitos de asistencia lingüística. Llame al [phone].    We comply with applicable federal civil rights laws and [place] laws. We do not discriminate on the basis of race, color, national origin, age, disability, sex, sexual orientation, or gender identity.            Thank you!     Thank you for choosing [facility]  for your care. Our goal is always  to provide you with excellent care. Hearing back from our patients is one way we can continue to improve our services. Please take a few minutes to complete the written survey that you may receive in the mail after your visit with us. Thank you!             Your Updated Medication List - Protect others around you: Learn how to safely use, store and throw away your medicines at www.disposemymeds.org.          This list is accurate as of 3/9/18  1:48 PM.  Always use your most recent med list.                   Brand Name Dispense Instructions for use Diagnosis    B-6 50 MG Tabs      25 mg bid prn    Supervision of other normal pregnancy, antepartum       prenatal multivitamin plus iron 27-0.8 MG Tabs per tablet      Take 1 tablet by mouth daily

## 2018-03-12 PROBLEM — Z34.80 SUPERVISION OF OTHER NORMAL PREGNANCY, ANTEPARTUM: Status: ACTIVE | Noted: 2018-03-12

## 2018-03-12 NOTE — PROGRESS NOTES
Neo Parra is a 27 year old,  female who presents by herself for 1st OB visit.  Her  Tushar is not here today.  She has talked with OB Ed prior to today's visit.  She is 9w0d by early u/s.  Her LMP date was exact but her cycles were irregular.  Her LMP date did not match with u/s date so we are going by u/s date.  She has the following concerns:  Significant nausea. No vomiting, but doesn't feel well most of hte day. She started taking vitamin B6 and it does help.  She denies bleeding or pain.  She is able to keep food and fluids down. She feels she is staying hydrated.       Past Medical History:   Diagnosis Date     NO ACTIVE PROBLEMS      I reviewed her previous OB history:  Obstetric History       T1      L1     SAB0   TAB0   Ectopic0   Multiple0   Live Births1       # Outcome Date GA Lbr Miguel Ángel/2nd Weight Sex Delivery Anes PTL Lv   2 Current            1 Term 16 39w4d 05:25 / 02:11 8 lb 5.3 oz (3.779 kg) F Vag-Spont EPI N MO      Name: Erika      Apgar1:  8                Apgar5: 9      Obstetric Comments   EDC 10/12/18 based on early us.   to Tushar.   .    Past Medical History:   Diagnosis Date     NO ACTIVE PROBLEMS       Patient Active Problem List    Diagnosis Date Noted     Anemia due to blood loss, acute 2016     Priority: Medium     CARDIOVASCULAR SCREENING; LDL GOAL LESS THAN 160 2011     Priority: Medium      O:  Vitals noted.  Patient alert, oriented, and in no acute distress.    Eyes: PERRLA, EOMI.  Ears:  Canals clear, TM's nl bilaterally.  No erythema or fluid.   Nares patent without inflammation or drainage.   Oral:  Oropharynx nl without erythema, exudate, mass or other lesions.   Neck:  Supple without lymphadenopathy, JVD or masses.  CV:  RRR without murmur.  Respiratory:  Lungs clear to auscultation bilaterally.  Breasts:  not examined today.   Abdomen:  Soft, nontender, nondistended with good bowel sounds and no masses or hepatosplenomegaly.   Uterus is not palpable is the abdomen.   Fetal heart tones were not heard as noted in OB flowsheet.    Vaginal exam reveals normal external and internal genitalia.  Cervix is closed, long and thick.  No lesions or abnormalities seen.  Bimanual exam reveals a nontender, gravid uterus consistant with 8-10 weeks with no CMT.  No adnexal masses or tenderness.   Extremities are normal without edema or lesions.    Routine labs were sent, including gen probe.    A:  First OB visit  Low risk  P:  Discussed routine pregnancy care, schedule of visits, nutrition, exercise, travel, answered questions.  Will follow up in 4 weeks or sooner with any concerns.  Will call with lab results.  Her BP was elevated on arrival, recheck after visit was normal.  We'll monitor this throughout her pregnancy. Discussed that she may use unisom in addition or call me with worsening nausea or vomiting and consider Zofran.  Discussed that FHT are not expected to be heard this early, should hear them at her next visit. She has already had an u/s that showed live SIUP.      Melanie Bartlett MD

## 2018-04-06 ENCOUNTER — PRENATAL OFFICE VISIT (OUTPATIENT)
Dept: FAMILY MEDICINE | Facility: CLINIC | Age: 28
End: 2018-04-06
Payer: COMMERCIAL

## 2018-04-06 VITALS
OXYGEN SATURATION: 100 % | BODY MASS INDEX: 28.51 KG/M2 | TEMPERATURE: 98.8 F | HEART RATE: 84 BPM | DIASTOLIC BLOOD PRESSURE: 62 MMHG | WEIGHT: 174 LBS | SYSTOLIC BLOOD PRESSURE: 132 MMHG

## 2018-04-06 DIAGNOSIS — Z34.80 SUPERVISION OF OTHER NORMAL PREGNANCY, ANTEPARTUM: Primary | ICD-10-CM

## 2018-04-06 PROCEDURE — 99207 ZZC PRENATAL VISIT: CPT | Performed by: FAMILY MEDICINE

## 2018-04-06 ASSESSMENT — PAIN SCALES - GENERAL: PAINLEVEL: NO PAIN (0)

## 2018-04-06 NOTE — PROGRESS NOTES
Here with Tushar.   Doing well overall.  No bleeding, no regular ctx. Overall her nausea is starting to get better. Still using B6, didn't take Unisom or Zofran.   Staying hydrated.   Discussed quickening.   Discussed afp, will consider for next visit.   Will plan routine sono at 20 weeks, ordered.   Will f/u in 4 weeks, sooner prn.   Melanie Bartlett MD

## 2018-04-06 NOTE — MR AVS SNAPSHOT
After Visit Summary   4/6/2018    Neo Parra    MRN: [id number]           Patient Information     Date Of Birth          1990        Visit Information        Provider Department      4/6/2018 1:30 PM Melanie Bartlett MD [facility]        Today's Diagnoses     Supervision of other normal pregnancy, antepartum    -  1       Follow-ups after your visit        Your next 10 appointments already scheduled     May 04, 2018 11:30 AM CDT   ESTABLISHED PRENATAL with Melanie Bartlett MD   [facility]    [address] [place] [address]   [phone]            Jun 01, 2018 11:30 AM CDT   ESTABLISHED PRENATAL with Melanie Bartlett MD   [facility]    [address] [place] [address]   [phone]            Jul 06, 2018 11:30 AM CDT   ESTABLISHED PRENATAL with Melanie Bartlett MD   [facility]    [address] [place] [address]   [phone]            Aug 03, 2018 11:30 AM CDT   ESTABLISHED PRENATAL with Melanie Bartlett MD   [facility]    [address] [place] [address]   [phone]            Aug 17, 2018 11:30 AM CDT   ESTABLISHED PRENATAL with Melanie Bartlett MD   [facility]    [address] [place] [address]   [phone]            Aug 31, 2018 11:30 AM CDT   ESTABLISHED PRENATAL with Melanie Bartlett MD   [facility])    [address] [place] [address]   [phone]            Sep 14, 2018 11:30 AM CDT   ESTABLISHED PRENATAL with Melanie Bartlett MD   [facility] ([facility])    [address] [place] [address]  [address]   [phone]            Sep 21, 2018 11:30 AM CDT   ESTABLISHED PRENATAL with Melanie Bartlett MD   [facility]    [address] [place] [address]   [phone]            Sep 28, 2018 11:30 AM CDT   ESTABLISHED PRENATAL with Melanie Bartlett MD   [facility] ([address] [address]   [phone]            Oct 05, 2018 11:30 AM CDT   ESTABLISHED PRENATAL with Melanie Bartlett MD   [facility] ([address] [address]   [phone]              Future tests that were ordered for you today     Open Future Orders        Priority Expected Expires Ordered    US OB > 14 Weeks Complete Single Routine 5/25/2018 7/6/2018 4/6/2018            Who to contact     If you have questions or need follow up information about today's clinic visit or your schedule please contact [facility] directly at [phone].  Normal or non-critical lab and imaging results will be communicated to you by MyChart, letter or phone within 4 business days after the clinic has received the results. If you do not hear from us within 7 days, please contact the clinic through Acacia Interactivehart or phone. If you have a critical or abnormal lab result, we will notify you by phone as soon as possible.  Submit refill requests through Kanmu or call your pharmacy and they will forward the refill request to us. Please allow 3 business days for your refill to be completed.          Additional Information About Your Visit        Acacia InteractiveharCloudmark Information     Kanmu gives you secure access to your electronic health record. If you see a primary care provider, you can also send messages to your care team and make appointments. If you have questions, please call your primary care clinic.  If you do not have a primary care  provider, please call [phone] and they will assist you.        Care EveryWhere ID     This is your Care EveryWhere ID. This could be used by other organizations to access your [place] medical records  [id number]        Your Vitals Were     Pulse Temperature Last Period Pulse Oximetry BMI (Body Mass Index)       84 98.8  F (37.1  C) (Temporal) 12/24/2017 100% 28.51 kg/m2        Blood Pressure from Last 3 Encounters:   04/06/18 132/62   03/09/18 124/68   02/14/18 130/78    Weight from Last 3 Encounters:   04/06/18 174 lb (78.9 kg)   03/09/18 175 lb (79.4 kg)   02/19/18 175 lb (79.4 kg)               Primary Care Provider Office Phone # Fax #    Melanie Lauren Bartlett [phone] [address] [place] [address]        Equal Access to Services     RIA LOVE AH: Hadii garett ku hadasho Soomaali, waaxda luqadaha, qaybta kaalmada adeegyada, waxay hipolitoin hayvonda ni . So [facility] [phone].    ATENCIÓN: Si habla español, tiene a bright disposición servicios gratuitos de asistencia lingüística. Llame al [phone].    We comply with applicable federal civil rights laws and [place] laws. We do not discriminate on the basis of race, color, national origin, age, disability, sex, sexual orientation, or gender identity.            Thank you!     Thank you for choosing [facility]  for your care. Our goal is always to provide you with excellent care. Hearing back from our patients is one way we can continue to improve our services. Please take a few minutes to complete the written survey that you may receive in the mail after your visit with us. Thank you!             Your Updated Medication List - Protect others around you: Learn how to safely use, store and throw away your medicines at www.disposemymeds.org.          This list is accurate as of 4/6/18  1:59 PM.  Always use your most recent med list.                   Brand Name Dispense Instructions for use  Diagnosis    B-6 50 MG Tabs      25 mg bid prn    Supervision of other normal pregnancy, antepartum       prenatal multivitamin plus iron 27-0.8 MG Tabs per tablet      Take 1 tablet by mouth daily

## 2018-04-06 NOTE — NURSING NOTE
"Chief Complaint   Patient presents with     Prenatal Care     nausea       Initial /62  Pulse 84  Temp 98.8  F (37.1  C) (Temporal)  Wt 174 lb (78.9 kg)  LMP 12/24/2017  SpO2 100%  BMI 28.51 kg/m2 Estimated body mass index is 28.51 kg/(m^2) as calculated from the following:    Height as of 2/19/18: 5' 5.5\" (1.664 m).    Weight as of this encounter: 174 lb (78.9 kg).  Medication Reconciliation: complete   Eva Smith CMA    "

## 2018-05-03 ENCOUNTER — MYC MEDICAL ADVICE (OUTPATIENT)
Dept: FAMILY MEDICINE | Facility: CLINIC | Age: 28
End: 2018-05-03

## 2018-05-03 NOTE — TELEPHONE ENCOUNTER
Patient is asked further questions via Grubster, as she has an appointment tomorrow, she is given home care measures for aches and pains and asked questions via Joule Unlimitedt.    Will wait for response.  TONY AlvarezN, RN

## 2018-05-04 ENCOUNTER — PRENATAL OFFICE VISIT (OUTPATIENT)
Dept: FAMILY MEDICINE | Facility: CLINIC | Age: 28
End: 2018-05-04
Payer: COMMERCIAL

## 2018-05-04 VITALS
WEIGHT: 180 LBS | HEART RATE: 120 BPM | OXYGEN SATURATION: 100 % | BODY MASS INDEX: 29.5 KG/M2 | SYSTOLIC BLOOD PRESSURE: 118 MMHG | DIASTOLIC BLOOD PRESSURE: 70 MMHG | TEMPERATURE: 98.3 F

## 2018-05-04 DIAGNOSIS — Z34.82 ENCOUNTER FOR SUPERVISION OF OTHER NORMAL PREGNANCY IN SECOND TRIMESTER: Primary | ICD-10-CM

## 2018-05-04 PROCEDURE — 99207 ZZC PRENATAL VISIT: CPT | Performed by: FAMILY MEDICINE

## 2018-05-04 ASSESSMENT — PAIN SCALES - GENERAL: PAINLEVEL: NO PAIN (0)

## 2018-05-04 NOTE — MR AVS SNAPSHOT
After Visit Summary   5/4/2018    Neo Parra    MRN: [id number]           Patient Information     Date Of Birth          1990        Visit Information        Provider Department      5/4/2018 11:30 AM Melanie Bartlett MD [facility]         Follow-ups after your visit        Your next 10 appointments already scheduled     May 25, 2018  2:00 PM CDT   US OB > 14 WEEKS COMPLETE SINGLE with PHUS1   [facility] ([facility])    [address] [place] [address]   [phone]           Please bring a list of your medicines (including vitamins, minerals and over-the-counter drugs). Also, tell your doctor about any allergies you may have. Wear comfortable clothes and leave your valuables at home.  If you re less than 20 weeks drink four 8-ounce glasses of fluid an hour before your exam. If you need to empty your bladder before your exam, try to release only a little urine. Then, drink another glass of fluid.  You may have up to two family members in the exam room. If you bring a small child, an adult must be there to care for him or her.  Please call the Imaging Department at your exam site with any questions.            Jun 01, 2018 11:30 AM CDT   ESTABLISHED PRENATAL with Melanie Bartlett MD   [facility]    [address] [place] [address]   [phone]            Jul 06, 2018 11:30 AM CDT   ESTABLISHED PRENATAL with Melanie Bartlett MD   [facility] ([facility])    [address] [place] [address]   [phone]            Aug 03, 2018 11:30 AM CDT   ESTABLISHED PRENATAL with Melanie Bartlett MD   [facility] ([facility])    [address] [place] [address]   [phone]            Aug 17, 2018 11:30 AM CDT   ESTABLISHED PRENATAL with Melanie  Lauren Bartlett MD   [facility]    [address] [place] [address]   [phone]            Aug 31, 2018 11:30 AM CDT   ESTABLISHED PRENATAL with Melanie Bartlett MD   [facility]    [address] [place] [address]   [phone]            Sep 14, 2018 11:30 AM CDT   ESTABLISHED PRENATAL with Melanie Bartlett MD   [facility]    [address] [place] [address]   [phone]            Sep 21, 2018 11:30 AM CDT   ESTABLISHED PRENATAL with Melanie Bartlett MD   [facility]    [address] [place] [address]   [phone]            Sep 28, 2018 11:30 AM CDT   ESTABLISHED PRENATAL with Melanie Bartlett MD   [facility]    [address] [place] [address]   [phone]            Oct 05, 2018 11:30 AM CDT   ESTABLISHED PRENATAL with Melanie Bartlett MD   [facility]    [address] [place] [address]   [phone]              Who to contact     If you have questions or need follow up information about today's clinic visit or your schedule please contact [facility] directly at [phone].  Normal or non-critical lab and imaging results will be communicated to you by MyChart, letter or phone within 4 business days after the clinic has received the results. If you do not hear from us within 7 days, please contact the clinic through MyChart or phone. If you have a critical or abnormal lab result, we will notify you by phone as soon as possible.  Submit refill requests through Figma or call your pharmacy and they will forward the refill request to us. Please allow 3 business days for your refill to  be completed.          Additional Information About Your Visit        Warwick Audio Technologieshart Information     [employer] gives you secure access to your electronic health record. If you see a primary care provider, you can also send messages to your care team and make appointments. If you have questions, please call your primary care clinic.  If you do not have a primary care provider, please call [phone] and they will assist you.        Care EveryWhere ID     This is your Care EveryWhere ID. This could be used by other organizations to access your [place] medical records  [id number]        Your Vitals Were     Pulse Temperature Last Period Pulse Oximetry BMI (Body Mass Index)       120 98.3  F (36.8  C) (Temporal) 12/24/2017 100% 29.5 kg/m2        Blood Pressure from Last 3 Encounters:   05/04/18 118/70   04/06/18 132/62   03/09/18 124/68    Weight from Last 3 Encounters:   05/04/18 180 lb (81.6 kg)   04/06/18 174 lb (78.9 kg)   03/09/18 175 lb (79.4 kg)              Today, you had the following     No orders found for display       Primary Care Provider Office Phone # Fax #    Melanie Lauren Bartlett [phone] [address] [place] [address]        Equal Access to Services     RIA LOVE : Hadii garett ku hadasho Soomaali, waaxda luqadaha, qaybta kaalmada adeegyada, waxay idiin haymichaeln xuan shaver lasupa duarte. So [facility] [phone].    ATENCIÓN: Si habla español, tiene a bright disposición servicios gratuitos de asistencia lingüística. Llame al [phone].    We comply with applicable federal civil rights laws and [place] laws. We do not discriminate on the basis of race, color, national origin, age, disability, sex, sexual orientation, or gender identity.            Thank you!     Thank you for choosing [facility]  for your care. Our goal is always to provide you with excellent care. Hearing back from our patients is one way we can continue to improve our services. Please take  a few minutes to complete the written survey that you may receive in the mail after your visit with us. Thank you!             Your Updated Medication List - Protect others around you: Learn how to safely use, store and throw away your medicines at www.disposemymeds.org.          This list is accurate as of 5/4/18 12:20 PM.  Always use your most recent med list.                   Brand Name Dispense Instructions for use Diagnosis    prenatal multivitamin plus iron 27-0.8 MG Tabs per tablet      Take 1 tablet by mouth daily

## 2018-05-04 NOTE — PROGRESS NOTES
"Doing well overall.  No bleeding, no regular ctx- Occasionally have been having pain right around where she feels her left ovary is, off and on over the last two days.  It is like a sharp stabbing pain that comes and goes. \"I used to get the same kind of thing when I would get my period\", but no bleeding. Not sure if baby movement or anything to be concerned about.   Discussed possible causes of pain, ovarian cyst, GI, muscle/ligament \"growing pains\", infection.  Since her exam is unremarkable and the pain is better, will just monitor for now.   No headaches, dizziness, visual blurriness, nausea, emesis, or other associated symptoms.   20 Week Ultrasound scheduled.   Patient Declined AFP screening.  Discussed quickening.    Follow up in 4 weeks or sooner if needed.    Melanie Bartlett MD   "

## 2018-05-25 ENCOUNTER — HOSPITAL ENCOUNTER (OUTPATIENT)
Dept: ULTRASOUND IMAGING | Facility: CLINIC | Age: 28
Discharge: HOME | End: 2018-05-25
Attending: FAMILY MEDICINE | Admitting: FAMILY MEDICINE
Payer: COMMERCIAL

## 2018-05-25 DIAGNOSIS — Z34.80 SUPERVISION OF OTHER NORMAL PREGNANCY, ANTEPARTUM: ICD-10-CM

## 2018-05-25 PROCEDURE — 76805 OB US >/= 14 WKS SNGL FETUS: CPT

## 2018-05-29 ENCOUNTER — TELEPHONE (OUTPATIENT)
Dept: FAMILY MEDICINE | Facility: CLINIC | Age: 28
End: 2018-05-29

## 2018-05-29 DIAGNOSIS — Z34.80 SUPERVISION OF OTHER NORMAL PREGNANCY, ANTEPARTUM: Primary | ICD-10-CM

## 2018-05-29 NOTE — TELEPHONE ENCOUNTER
Order placed, left msg for pt to call clinic back. Please help schedule US appt. Will send Foodzaihart msg as well. Magalie Estrada, CMA

## 2018-05-29 NOTE — TELEPHONE ENCOUNTER
----- Message from Melanie Bartlett MD sent at 5/25/2018  3:56 PM CDT -----  See message to patient. Please arrange follow up sono in 1-2 weeks.   Melanie Bartlett MD

## 2018-05-30 ENCOUNTER — PRENATAL OFFICE VISIT (OUTPATIENT)
Dept: FAMILY MEDICINE | Facility: CLINIC | Age: 28
End: 2018-05-30
Payer: COMMERCIAL

## 2018-05-30 VITALS
WEIGHT: 181.4 LBS | HEART RATE: 128 BPM | OXYGEN SATURATION: 100 % | TEMPERATURE: 98.1 F | DIASTOLIC BLOOD PRESSURE: 66 MMHG | SYSTOLIC BLOOD PRESSURE: 124 MMHG | BODY MASS INDEX: 29.73 KG/M2

## 2018-05-30 DIAGNOSIS — Z34.80 SUPERVISION OF OTHER NORMAL PREGNANCY, ANTEPARTUM: Primary | ICD-10-CM

## 2018-05-30 PROCEDURE — 99207 ZZC PRENATAL VISIT: CPT | Performed by: FAMILY MEDICINE

## 2018-05-30 ASSESSMENT — PAIN SCALES - GENERAL: PAINLEVEL: NO PAIN (0)

## 2018-05-30 NOTE — MR AVS SNAPSHOT
After Visit Summary   5/30/2018    Neo Parra    MRN: [id number]           Patient Information     Date Of Birth          1990        Visit Information        Provider Department      5/30/2018 11:30 AM Melanie Bartlett MD [facility]        Today's Diagnoses     Supervision of other normal pregnancy, antepartum    -  1       Follow-ups after your visit        Your next 10 appointments already scheduled     Jun 08, 2018  9:15 AM CDT   US OB SINGLE FOLLOW UP REPEAT with PHUS1   [facility]trasound ([facility])    [address] [place] [address]   [phone]           Please bring a list of your medicines (including vitamins, minerals and over-the-counter drugs). Also, tell your doctor about any allergies you may have. Wear comfortable clothes and leave your valuables at home.  If you re less than 20 weeks drink four 8-ounce glasses of fluid an hour before your exam. If you need to empty your bladder before your exam, try to release only a little urine. Then, drink another glass of fluid.  You may have up to two family members in the exam room. If you bring a small child, an adult must be there to care for him or her.  Please call the Imaging Department at your exam site with any questions.            Jul 06, 2018 11:30 AM CDT   ESTABLISHED PRENATAL with Melanie Bartlett MD   [facility] ([address] [address]   [phone]            Aug 03, 2018 11:30 AM CDT   ESTABLISHED PRENATAL with Melanie Bartlett MD   [facility] ([facility])    [address] [place] [address]   [phone]            Aug 17, 2018 11:30 AM CDT   ESTABLISHED PRENATAL with Melanie Bartlett MD   [facility] ([facility])    [address] [place] [address]    [phone]            Aug 31, 2018 11:30 AM CDT   ESTABLISHED PRENATAL with Melanie Bartlett MD   [facility] ([facility])    [address] [place] [address]   [phone]            Sep 14, 2018 11:30 AM CDT   ESTABLISHED PRENATAL with Melanie Bartlett MD   [facility] ([facility])    [address] [place] [address]   [phone]            Sep 21, 2018 11:30 AM CDT   ESTABLISHED PRENATAL with Melanie Bartlett MD   [facility] ([facility])    [address] [place] [address]   [phone]            Sep 28, 2018 11:30 AM CDT   ESTABLISHED PRENATAL with Melanie Bartlett MD   [facility] ([facility])    [address] [place] [address]   [phone]            Oct 05, 2018 11:30 AM CDT   ESTABLISHED PRENATAL with Melanie Bartlett MD   [facility] ([facility])    [address] [place] [address]   [phone]            Oct 12, 2018 11:30 AM CDT   ESTABLISHED PRENATAL with Melanie Bartlett MD   [facility] ([facility])    [address] [place] [address]   [phone]              Future tests that were ordered for you today     Open Future Orders        Priority Expected Expires Ordered    OB hemoglobin Routine 6/30/2018 8/30/2018 5/30/2018    Glucose tolerance, gest screen, 1 hour Routine 6/30/2018 8/30/2018 5/30/2018    Treponema Abs w Reflex to RPR and Titer Routine 6/30/2018 8/30/2018 5/30/2018    US OB Ltd One Or More Fetus FU/Repeat Routine  5/29/2019 5/29/2018            Who to contact     If you have questions or need follow up information about today's clinic visit or your schedule please contact [facility] directly at [phone].  Normal or non-critical  lab and imaging results will be communicated to you by MyChart, letter or phone within 4 business days after the clinic has received the results. If you do not hear from us within 7 days, please contact the clinic through [employer] or phone. If you have a critical or abnormal lab result, we will notify you by phone as soon as possible.  Submit refill requests through "[employer] or call your pharmacy and they will forward the refill request to us. Please allow 3 business days for your refill to be completed.          Additional Information About Your Visit        [employer] Information     "[employer] gives you secure access to your electronic health record. If you see a primary care provider, you can also send messages to your care team and make appointments. If you have questions, please call your primary care clinic.  If you do not have a primary care provider, please call [phone] and they will assist you.        Care EveryWhere ID     This is your Care EveryWhere ID. This could be used by other organizations to access your [place] medical records  [id number]        Your Vitals Were     Pulse Temperature Last Period Pulse Oximetry BMI (Body Mass Index)       128 98.1  F (36.7  C) (Temporal) 12/24/2017 100% 29.73 kg/m2        Blood Pressure from Last 3 Encounters:   05/30/18 124/66   05/04/18 118/70   04/06/18 132/62    Weight from Last 3 Encounters:   05/30/18 181 lb 6.4 oz (82.3 kg)   05/04/18 180 lb (81.6 kg)   04/06/18 174 lb (78.9 kg)               Primary Care Provider Office Phone # Fax #    Melanie Lauren Bartlett [phone]       [address] [place] [address]        Equal Access to Services     [facility]: Hadii aad ku hadasho Soomaali, waaxda luqadaha, qaybta kaalmada adeegyada, clarissa ni . So [facility] [phone].    ATENCIÓN: Si habla español, tiene a bright disposición servicios gratuitos de asistencia lingüística. Llame al [phone].    We comply  with applicable federal civil rights laws and [place] laws. We do not discriminate on the basis of race, color, national origin, age, disability, sex, sexual orientation, or gender identity.            Thank you!     Thank you for choosing [facility]  for your care. Our goal is always to provide you with excellent care. Hearing back from our patients is one way we can continue to improve our services. Please take a few minutes to complete the written survey that you may receive in the mail after your visit with us. Thank you!             Your Updated Medication List - Protect others around you: Learn how to safely use, store and throw away your medicines at www.disposemymeds.org.          This list is accurate as of 5/30/18 12:17 PM.  Always use your most recent med list.                   Brand Name Dispense Instructions for use Diagnosis    prenatal multivitamin plus iron 27-0.8 MG Tabs per tablet      Take 1 tablet by mouth daily

## 2018-05-30 NOTE — PROGRESS NOTES
Doing well overall.  No bleeding, no pain or concerns.   No headaches, dizziness, visual blurriness, nausea, emesis, or other associated symptoms.   She is feeling fetal movement.  Discussed fetal kick counts at 22 weeks.  1 hour GTT, RPR, Hgb at next visit  Discussed  labor.  Couldn't see 4 chamber heart on ultrasound so she has a repeat next Friday.  Follow up in 4 weeks or sooner if needed.  Melanie Bartlett MD

## 2018-06-08 ENCOUNTER — HOSPITAL ENCOUNTER (OUTPATIENT)
Dept: ULTRASOUND IMAGING | Facility: CLINIC | Age: 28
Discharge: HOME | End: 2018-06-08
Attending: FAMILY MEDICINE | Admitting: FAMILY MEDICINE
Payer: COMMERCIAL

## 2018-06-08 DIAGNOSIS — Z34.80 SUPERVISION OF OTHER NORMAL PREGNANCY, ANTEPARTUM: ICD-10-CM

## 2018-06-08 PROCEDURE — 76816 OB US FOLLOW-UP PER FETUS: CPT

## 2018-06-15 ENCOUNTER — MEDICAL CORRESPONDENCE (OUTPATIENT)
Dept: HEALTH INFORMATION MANAGEMENT | Facility: CLINIC | Age: 28
End: 2018-06-15

## 2018-07-06 ENCOUNTER — PRENATAL OFFICE VISIT (OUTPATIENT)
Dept: FAMILY MEDICINE | Facility: CLINIC | Age: 28
End: 2018-07-06
Payer: COMMERCIAL

## 2018-07-06 VITALS
TEMPERATURE: 98 F | HEART RATE: 131 BPM | BODY MASS INDEX: 30.61 KG/M2 | SYSTOLIC BLOOD PRESSURE: 128 MMHG | DIASTOLIC BLOOD PRESSURE: 72 MMHG | WEIGHT: 186.8 LBS | OXYGEN SATURATION: 100 %

## 2018-07-06 DIAGNOSIS — Z34.80 SUPERVISION OF OTHER NORMAL PREGNANCY, ANTEPARTUM: Primary | ICD-10-CM

## 2018-07-06 DIAGNOSIS — O99.810 ABNORMAL MATERNAL GLUCOSE TOLERANCE, ANTEPARTUM: ICD-10-CM

## 2018-07-06 LAB
GLUCOSE 1H P 50 G GLC PO SERPL-MCNC: 151 MG/DL (ref 60–129)
HGB BLD-MCNC: 12.2 G/DL (ref 11.7–15.7)

## 2018-07-06 PROCEDURE — 36415 COLL VENOUS BLD VENIPUNCTURE: CPT | Performed by: FAMILY MEDICINE

## 2018-07-06 PROCEDURE — 82950 GLUCOSE TEST: CPT | Performed by: FAMILY MEDICINE

## 2018-07-06 PROCEDURE — 99207 ZZC PRENATAL VISIT: CPT | Performed by: FAMILY MEDICINE

## 2018-07-06 PROCEDURE — 00000218 ZZHCL STATISTIC OBHBG - HEMOGLOBIN: Performed by: FAMILY MEDICINE

## 2018-07-06 PROCEDURE — 86780 TREPONEMA PALLIDUM: CPT | Performed by: FAMILY MEDICINE

## 2018-07-06 ASSESSMENT — PAIN SCALES - GENERAL: PAINLEVEL: NO PAIN (0)

## 2018-07-06 NOTE — MR AVS SNAPSHOT
After Visit Summary   7/6/2018    Neo Parra    MRN: [id number]           Patient Information     Date Of Birth          1990        Visit Information        Provider Department      7/6/2018 11:30 AM Melanie Bartlett MD [facility]        Today's Diagnoses     Supervision of other normal pregnancy, antepartum           Follow-ups after your visit        Your next 10 appointments already scheduled     Aug 03, 2018 11:30 AM CDT   ESTABLISHED PRENATAL with Melanie Bartlett MD   [facility] ([facility])    [address] [place] [address]   [phone]            Aug 17, 2018 11:30 AM CDT   ESTABLISHED PRENATAL with Melanie Bartlett MD   [facility] ([facility])    [address] [place] [address]   [phone]            Aug 31, 2018 11:30 AM CDT   ESTABLISHED PRENATAL with Melanie Bartlett MD   [facility] ([facility])    [address] [place] [address]   [phone]            Sep 14, 2018 11:30 AM CDT   ESTABLISHED PRENATAL with Melanie Bartlett MD   [facility] ([facility])    [address] [place] [address]   [phone]            Sep 21, 2018 11:30 AM CDT   ESTABLISHED PRENATAL with Melanie Bartlett MD   [facility] ([facility])    [address] [place] [address]   [phone]            Sep 28, 2018 11:30 AM CDT   ESTABLISHED PRENATAL with Melanie Bartlett MD   [facility] ([facility])    [address] [place] [address]   [phone]            Oct 05, 2018 11:30 AM CDT   ESTABLISHED PRENATAL with Melanie Bartlett MD   [facility] ([facility])    [address] [place] [address]  [address]   [phone]            Oct 12, 2018 11:30 AM CDT   ESTABLISHED PRENATAL with Melanie Bartlett MD   [facility] ([address] [phone] [phone]            Oct 19, 2018 11:30 AM CDT   ESTABLISHED PRENATAL with Melanie Bartlett MD   [facility] ([address] [phone] [phone]              Who to contact     If you have questions or need follow up information about today's clinic visit or your schedule please contact [facility] directly at [phone].  Normal or non-critical lab and imaging results will be communicated to you by MyChart, letter or phone within 4 business days after the clinic has received the results. If you do not hear from us within 7 days, please contact the clinic through [employer] or phone. If you have a critical or abnormal lab result, we will notify you by phone as soon as possible.  Submit refill requests through [employer] or call your pharmacy and they will forward the refill request to us. Please allow 3 business days for your refill to be completed.          Additional Information About Your Visit        [employer] Information     [employer] gives you secure access to your electronic health record. If you see a primary care provider, you can also send messages to your care team and make appointments. If you have questions, please call your primary care clinic.  If you do not have a primary care provider, please call [phone] and they will assist you.        Care EveryWhere ID     This is your Care EveryWhere ID. This could be used by other organizations to access your [place] medical records  [id number]        Your Vitals Were     Pulse Temperature Last Period Pulse Oximetry BMI (Body Mass Index)       131 98  F (36.7  C) (Temporal) 12/24/2017 100% 30.61 kg/m2        Blood Pressure from Last 3  Encounters:   07/06/18 128/72   05/30/18 124/66   05/04/18 118/70    Weight from Last 3 Encounters:   07/06/18 186 lb 12.8 oz (84.7 kg)   05/30/18 181 lb 6.4 oz (82.3 kg)   05/04/18 180 lb (81.6 kg)              We Performed the Following     Glucose tolerance, gest screen, 1 hour     OB hemoglobin     Treponema Abs w Reflex to RPR and Titer        Primary Care Provider Office Phone # Fax #    Melanie Lauren Bartlett [phone] [address] [place] [address]        Equal Access to Services     [facility] : Hadii garett Estrella, waaxda lulizbethadaha, qaybta kaalmada manasa, clarissa duarte. So [facility] [phone].    ATENCIÓN: Si habla español, tiene a bright disposición servicios gratuitos de asistencia lingüística. Llame al [phone].    We comply with applicable federal civil rights laws and [place] laws. We do not discriminate on the basis of race, color, national origin, age, disability, sex, sexual orientation, or gender identity.            Thank you!     Thank you for choosing [facility]  for your care. Our goal is always to provide you with excellent care. Hearing back from our patients is one way we can continue to improve our services. Please take a few minutes to complete the written survey that you may receive in the mail after your visit with us. Thank you!             Your Updated Medication List - Protect others around you: Learn how to safely use, store and throw away your medicines at www.disposemymeds.org.          This list is accurate as of 7/6/18  1:21 PM.  Always use your most recent med list.                   Brand Name Dispense Instructions for use Diagnosis    prenatal multivitamin plus iron 27-0.8 MG Tabs per tablet      Take 1 tablet by mouth daily

## 2018-07-06 NOTE — PROGRESS NOTES
Doing well overall.  No bleeding, no regular ctx. Just having a lot of pubic symphysis pain.  Discussed using maternity support belt, warm baths, ice packs, etc.   1 hour GTT, Hgb, and RPR pending.   Will offer tdap next visit in 4 weeks.   Discussed  labor.   Discussed warning signs for preeclampsia.  Will f/u sooner with any concern for decreased fetal movement, vaginal bleeding, fluid leak, headache, vision change, severe nausea or vomiting, abdominal pain, increased edema or other concerns.   Melanie Bartlett MD

## 2018-07-06 NOTE — PROGRESS NOTES
Neo, unfortunately you did NOT pass your diabetes test.  I'd like you to complete the 3 hour test, so I'll have one of my team assistants call you to set this up. Your blood count is fine, you are NOT anemic.   Melanie Bartlett MD

## 2018-07-07 LAB — T PALLIDUM AB SER QL: NONREACTIVE

## 2018-07-09 DIAGNOSIS — O99.810 ABNORMAL MATERNAL GLUCOSE TOLERANCE, ANTEPARTUM: ICD-10-CM

## 2018-07-09 LAB
GLUCOSE 1H P 100 G GLC PO SERPL-MCNC: 122 MG/DL (ref 60–179)
GLUCOSE 2H P 100 G GLC PO SERPL-MCNC: 104 MG/DL (ref 60–154)
GLUCOSE 3H P 100 G GLC PO SERPL-MCNC: 107 MG/DL (ref 60–139)
GLUCOSE P FAST SERPL-MCNC: 80 MG/DL (ref 60–94)

## 2018-07-09 PROCEDURE — 36415 COLL VENOUS BLD VENIPUNCTURE: CPT | Performed by: FAMILY MEDICINE

## 2018-07-09 PROCEDURE — 82952 GTT-ADDED SAMPLES: CPT | Performed by: FAMILY MEDICINE

## 2018-07-09 PROCEDURE — 82951 GLUCOSE TOLERANCE TEST (GTT): CPT | Performed by: FAMILY MEDICINE

## 2018-08-03 ENCOUNTER — PRENATAL OFFICE VISIT (OUTPATIENT)
Dept: FAMILY MEDICINE | Facility: CLINIC | Age: 28
End: 2018-08-03
Payer: COMMERCIAL

## 2018-08-03 VITALS
SYSTOLIC BLOOD PRESSURE: 122 MMHG | TEMPERATURE: 97.9 F | OXYGEN SATURATION: 100 % | BODY MASS INDEX: 30.91 KG/M2 | WEIGHT: 188.6 LBS | HEART RATE: 130 BPM | DIASTOLIC BLOOD PRESSURE: 68 MMHG

## 2018-08-03 DIAGNOSIS — Z34.80 SUPERVISION OF OTHER NORMAL PREGNANCY, ANTEPARTUM: Primary | ICD-10-CM

## 2018-08-03 DIAGNOSIS — Z23 ENCOUNTER FOR IMMUNIZATION: ICD-10-CM

## 2018-08-03 PROCEDURE — 90471 IMMUNIZATION ADMIN: CPT | Performed by: FAMILY MEDICINE

## 2018-08-03 PROCEDURE — 99207 ZZC PRENATAL VISIT: CPT | Performed by: FAMILY MEDICINE

## 2018-08-03 PROCEDURE — 90715 TDAP VACCINE 7 YRS/> IM: CPT | Performed by: FAMILY MEDICINE

## 2018-08-03 ASSESSMENT — PAIN SCALES - GENERAL: PAINLEVEL: NO PAIN (0)

## 2018-08-03 NOTE — MR AVS SNAPSHOT
After Visit Summary   8/3/2018    Neo Parra    MRN: [id number]           Patient Information     Date Of Birth          1990        Visit Information        Provider Department      8/3/2018 11:30 AM Melanie Bartlett MD [facility]        Today's Diagnoses     Encounter for immunization    -  1       Follow-ups after your visit        Your next 10 appointments already scheduled     Aug 17, 2018 11:30 AM CDT   ESTABLISHED PRENATAL with Melanie Bartlett MD   [facility] ([facility])    [address] [place] [address]   [phone]            Aug 31, 2018 11:30 AM CDT   ESTABLISHED PRENATAL with Melanie Bartlett MD   [facility] ([facility])    [address] [place] [address]   [phone]            Sep 14, 2018 11:30 AM CDT   ESTABLISHED PRENATAL with Melanie Bartlett MD   [facility] ([facility])    [address] [place] [address]   [phone]            Sep 21, 2018 11:30 AM CDT   ESTABLISHED PRENATAL with Melanie Bartlett MD   [facility] ([facility])    [address] [place] [address]   [phone]            Sep 28, 2018 11:30 AM CDT   ESTABLISHED PRENATAL with Melanie Bartlett MD   [facility] ([facility])    [address] [place] [address]   [phone]            Oct 05, 2018 11:30 AM CDT   ESTABLISHED PRENATAL with Melanie Bartlett MD   [facility] ([facility])    [address] [place] [address]   [phone]            Oct 12, 2018 11:30 AM CDT   ESTABLISHED PRENATAL with Melanie Bartlett MD   [facility] ([facility])    [address] [place] [address]    [phone]            Oct 19, 2018 11:30 AM CDT   ESTABLISHED PRENATAL with Melanie Bartlett MD   [facility] ([facility])    [address] [place] [address] [phone]              Who to contact     If you have questions or need follow up information about today's clinic visit or your schedule please contact [facility] directly at [phone].  Normal or non-critical lab and imaging results will be communicated to you by [employer], letter or phone within 4 business days after the clinic has received the results. If you do not hear from us within 7 days, please contact the clinic through [employer] or phone. If you have a critical or abnormal lab result, we will notify you by phone as soon as possible.  Submit refill requests through [employer] or call your pharmacy and they will forward the refill request to us. Please allow 3 business days for your refill to be completed.          Additional Information About Your Visit        [employer] Information     [employer] gives you secure access to your electronic health record. If you see a primary care provider, you can also send messages to your care team and make appointments. If you have questions, please call your primary care clinic.  If you do not have a primary care provider, please call [phone] and they will assist you.        Care EveryWhere ID     This is your Care EveryWhere ID. This could be used by other organizations to access your [place] medical records  [id number]        Your Vitals Were     Pulse Temperature Last Period Pulse Oximetry BMI (Body Mass Index)       130 97.9  F (36.6  C) (Temporal) 12/24/2017 100% 30.91 kg/m2        Blood Pressure from Last 3 Encounters:   08/03/18 122/68   07/06/18 128/72   05/30/18 124/66    Weight from Last 3 Encounters:   08/03/18 188 lb 9.6 oz (85.5 kg)   07/06/18 186 lb 12.8 oz (84.7 kg)   05/30/18 181 lb 6.4 oz (82.3 kg)              We  Performed the Following     ADMIN 1st VACCINE     TDAP, IM (10 - 64 YRS) - Adacel        Primary Care Provider Office Phone # Fax #    Melanie Lauren Bartlett [phone] [address] [place] [address]        Equal Access to Services     [facility]: Hadii aad ku hadasho Soomaali, waaxda luqadaha, qaybta kaalmada adeegyada, waxay hipolitoin hayaan adesai santanaraphaelmikel lasupa . So [facility] [phone].    ATENCIÓN: Si habla español, tiene a bright disposición servicios gratuitos de asistencia lingüística. Llame al [phone].    We comply with applicable federal civil rights laws and [place] laws. We do not discriminate on the basis of race, color, national origin, age, disability, sex, sexual orientation, or gender identity.            Thank you!     Thank you for choosing [facility]  for your care. Our goal is always to provide you with excellent care. Hearing back from our patients is one way we can continue to improve our services. Please take a few minutes to complete the written survey that you may receive in the mail after your visit with us. Thank you!             Your Updated Medication List - Protect others around you: Learn how to safely use, store and throw away your medicines at www.disposemymeds.org.          This list is accurate as of 8/3/18 12:53 PM.  Always use your most recent med list.                   Brand Name Dispense Instructions for use Diagnosis    prenatal multivitamin plus iron 27-0.8 MG Tabs per tablet      Take 1 tablet by mouth daily

## 2018-08-03 NOTE — PROGRESS NOTES
Concerns: She is continuing to have pubic symphysis pain. She is using the maternity belt when working with some relief. She is also beginning to have headaches- she is staying hydrated, resting when able and taking Tylenol without relief. No vision changes, edema, n/v.   No vaginal bleeding, LOF.  No contractions.  Cephalic position confirmed by Leopold maneuvers.  Discussed kick counts and fetal movement.  Discussed PTL, PROM, and when to call or come in.  Checklist updated, see prenatal flowsheet for details  RTC 2 weeks.  Mela NOBLE  Pt was personally seen, interviewed and examined by me, chart notes edited and I agree with assessment as documented above.  Melanie Bartlett MD

## 2018-08-17 ENCOUNTER — PRENATAL OFFICE VISIT (OUTPATIENT)
Dept: FAMILY MEDICINE | Facility: CLINIC | Age: 28
End: 2018-08-17
Payer: COMMERCIAL

## 2018-08-17 VITALS
SYSTOLIC BLOOD PRESSURE: 122 MMHG | OXYGEN SATURATION: 99 % | BODY MASS INDEX: 31.5 KG/M2 | HEART RATE: 107 BPM | WEIGHT: 192.2 LBS | DIASTOLIC BLOOD PRESSURE: 66 MMHG | TEMPERATURE: 97.4 F

## 2018-08-17 DIAGNOSIS — Z34.80 SUPERVISION OF OTHER NORMAL PREGNANCY, ANTEPARTUM: Primary | ICD-10-CM

## 2018-08-17 DIAGNOSIS — G43.109 MIGRAINE WITH AURA AND WITHOUT STATUS MIGRAINOSUS, NOT INTRACTABLE: ICD-10-CM

## 2018-08-17 PROCEDURE — 99207 ZZC PRENATAL VISIT: CPT | Performed by: FAMILY MEDICINE

## 2018-08-17 ASSESSMENT — PAIN SCALES - GENERAL: PAINLEVEL: NO PAIN (0)

## 2018-08-17 NOTE — PROGRESS NOTES
Doing well overall.  No bleeding, no regular ctx.   She still gets typical migraines, gets some visual changes briefly, which then resolve, then severe headache hits shortly afterward.  Similar to prior to pregnancy.   We discussed using increased fluid intake, tylenol and rest.  Her BP is normal.  Discussed that if these worsen or her BP starts to climb, will run preeclampsia labs but at this time I believe these are typical migraines.    Discussed kick counts and fetal movement.  Discussed PTL, PROM, and when to call or come in.  Discussed warning signs for preeclampsia.  Will f/u in 2 week(s) or sooner with any concern for decreased fetal movement, vaginal bleeding, fluid leak, headache, vision change, severe nausea or vomiting, abdominal pain, increased edema or other concerns.   Melanie Bartlett MD

## 2018-08-17 NOTE — MR AVS SNAPSHOT
After Visit Summary   8/17/2018    Neo Parra    MRN: [id number]           Patient Information     Date Of Birth          1990        Visit Information        Provider Department      8/17/2018 11:30 AM Melanie Bartlett MD [facility]         Follow-ups after your visit        Your next 10 appointments already scheduled     Aug 31, 2018 11:30 AM CDT   ESTABLISHED PRENATAL with Melanie Bartlett MD   [facility] ([facility]    [address] [place] [address]   [phone]            Sep 14, 2018 11:30 AM CDT   ESTABLISHED PRENATAL with Melanie Bartlett MD   [facility] ([facility]    [address] [place] [address]   [phone]            Sep 21, 2018 11:30 AM CDT   ESTABLISHED PRENATAL with Melanie Bartlett MD   [facility]    [address] [place] [address]   [phone]            Sep 28, 2018 11:30 AM CDT   ESTABLISHED PRENATAL with Melanie Bartlett MD   [facility]    [address] [place] [address]   [phone]            Oct 05, 2018 11:30 AM CDT   ESTABLISHED PRENATAL with Melanie Bartlett MD   [facility]    [address] [place] [address]   [phone]            Oct 12, 2018 11:30 AM CDT   ESTABLISHED PRENATAL with Melanie Bartlett MD   [facility]    [address] [place] [address]   [phone]            Oct 19, 2018 11:30 AM CDT   ESTABLISHED PRENATAL with Mealnie Bartlett MD   [facility]    [address] [place] [address]   [phone]              Who to contact     If you have questions or  need follow up information about today's clinic visit or your schedule please contact [facility] directly at [phone].  Normal or non-critical lab and imaging results will be communicated to you by MyChart, letter or phone within 4 business days after the clinic has received the results. If you do not hear from us within 7 days, please contact the clinic through Mercantechart or phone. If you have a critical or abnormal lab result, we will notify you by phone as soon as possible.  Submit refill requests through [employer] SmarteBuilding or call your pharmacy and they will forward the refill request to us. Please allow 3 business days for your refill to be completed.          Additional Information About Your Visit        MercantecharCapstory Information     [employer] gives you secure access to your electronic health record. If you see a primary care provider, you can also send messages to your care team and make appointments. If you have questions, please call your primary care clinic.  If you do not have a primary care provider, please call [phone] and they will assist you.        Care EveryWhere ID     This is your Care EveryWhere ID. This could be used by other organizations to access your [place] medical records  [id number]        Your Vitals Were     Pulse Temperature Last Period Pulse Oximetry BMI (Body Mass Index)       107 97.4  F (36.3  C) (Temporal) 12/24/2017 99% 31.5 kg/m2        Blood Pressure from Last 3 Encounters:   08/17/18 122/66   08/03/18 122/68   07/06/18 128/72    Weight from Last 3 Encounters:   08/17/18 192 lb 3.2 oz (87.2 kg)   08/03/18 188 lb 9.6 oz (85.5 kg)   07/06/18 186 lb 12.8 oz (84.7 kg)              Today, you had the following     No orders found for display       Primary Care Provider Office Phone # Fax #    Melanie Bartlett [phone]        [facility] [address] [place] [address]        Equal Access to Services     RIA LOVE AH: Jaleel Estrella,  pedrito kelly, cheri michaelrosalind veronakayla, clarissa hipolitoin hayaan veronasai esthersai laJenniferaanazia ah. So [facility] [phone].    ATENCIÓN: Si habla heath, tiene a bright disposición servicios gratuitos de asistencia lingüística. Llame al [phone].    We comply with applicable federal civil rights laws and [place] laws. We do not discriminate on the basis of race, color, national origin, age, disability, sex, sexual orientation, or gender identity.            Thank you!     Thank you for choosing [facility]  for your care. Our goal is always to provide you with excellent care. Hearing back from our patients is one way we can continue to improve our services. Please take a few minutes to complete the written survey that you may receive in the mail after your visit with us. Thank you!             Your Updated Medication List - Protect others around you: Learn how to safely use, store and throw away your medicines at www.disposemymeds.org.          This list is accurate as of 8/17/18 11:51 AM.  Always use your most recent med list.                   Brand Name Dispense Instructions for use Diagnosis    prenatal multivitamin plus iron 27-0.8 MG Tabs per tablet      Take 1 tablet by mouth daily

## 2018-08-31 ENCOUNTER — PRENATAL OFFICE VISIT (OUTPATIENT)
Dept: FAMILY MEDICINE | Facility: CLINIC | Age: 28
End: 2018-08-31
Payer: COMMERCIAL

## 2018-08-31 VITALS
OXYGEN SATURATION: 100 % | TEMPERATURE: 96.3 F | SYSTOLIC BLOOD PRESSURE: 122 MMHG | WEIGHT: 192 LBS | BODY MASS INDEX: 31.46 KG/M2 | HEART RATE: 72 BPM | DIASTOLIC BLOOD PRESSURE: 62 MMHG | RESPIRATION RATE: 20 BRPM

## 2018-08-31 DIAGNOSIS — Z34.80 SUPERVISION OF OTHER NORMAL PREGNANCY, ANTEPARTUM: Primary | ICD-10-CM

## 2018-08-31 PROCEDURE — 99207 ZZC PRENATAL VISIT: CPT | Performed by: FAMILY MEDICINE

## 2018-08-31 ASSESSMENT — PAIN SCALES - GENERAL: PAINLEVEL: NO PAIN (0)

## 2018-08-31 NOTE — MR AVS SNAPSHOT
After Visit Summary   8/31/2018    Neo Parra    MRN: [id number]           Patient Information     Date Of Birth          1990        Visit Information        Provider Department      8/31/2018 11:30 AM Melanie Bartlett MD [facility]        Today's Diagnoses     Supervision of other normal pregnancy, antepartum    -  1       Follow-ups after your visit        Your next 10 appointments already scheduled     Sep 14, 2018 11:30 AM CDT   ESTABLISHED PRENATAL with Melanie Bartlett MD   [facility] ([address] [address]   [phone]            Sep 21, 2018 11:30 AM CDT   ESTABLISHED PRENATAL with Melanie Bartlett MD   [facility] ([address] [address]   [phone]            Sep 28, 2018 11:30 AM CDT   ESTABLISHED PRENATAL with Melanie Bartlett MD   [facility] ([address] [address]   [phone]            Oct 05, 2018 11:30 AM CDT   ESTABLISHED PRENATAL with Melanie Bartlett MD   [facility] ([address] [address]   [phone]            Oct 12, 2018 11:30 AM CDT   ESTABLISHED PRENATAL with Melanie Bartlett MD   [facility] ([address] [address]   [phone]            Oct 19, 2018 11:30 AM CDT   ESTABLISHED PRENATAL with Melanie Bartlett MD   [facility] ([address] [address]   [phone]              Who to contact     If you have questions or need follow up information about today's clinic visit or your schedule please contact [facility] directly at  [phone].  Normal or non-critical lab and imaging results will be communicated to you by MyChart, letter or phone within 4 business days after the clinic has received the results. If you do not hear from us within 7 days, please contact the clinic through Budgehart or phone. If you have a critical or abnormal lab result, we will notify you by phone as soon as possible.  Submit refill requests through ReviewPro or call your pharmacy and they will forward the refill request to us. Please allow 3 business days for your refill to be completed.          Additional Information About Your Visit        Budgehart Information     ReviewPro gives you secure access to your electronic health record. If you see a primary care provider, you can also send messages to your care team and make appointments. If you have questions, please call your primary care clinic.  If you do not have a primary care provider, please call [phone] and they will assist you.        Care EveryWhere ID     This is your Care EveryWhere ID. This could be used by other organizations to access your [place] medical records  [id number]        Your Vitals Were     Pulse Temperature Respirations Last Period Pulse Oximetry BMI (Body Mass Index)    72 96.3  F (35.7  C) (Temporal) 20 12/24/2017 100% 31.46 kg/m2       Blood Pressure from Last 3 Encounters:   08/31/18 122/62   08/17/18 122/66   08/03/18 122/68    Weight from Last 3 Encounters:   08/31/18 192 lb (87.1 kg)   08/17/18 192 lb 3.2 oz (87.2 kg)   08/03/18 188 lb 9.6 oz (85.5 kg)              Today, you had the following     No orders found for display       Primary Care Provider Office Phone # Fax #    Melanie Lauren Bartlett [phone]       [address] [place] [address]        Equal Access to Services     AMANDO LOVE : Jaleel Estrella, waabby luqadaha, qaybta kaalmada manasa, clarissa duarte. So [facility] [phone].    ATENCIÓN:  Si habla heath, tiene a bright disposición servicios gratuitos de asistencia lingüística. Torrey stinson [phone].    We comply with applicable federal civil rights laws and [place] laws. We do not discriminate on the basis of race, color, national origin, age, disability, sex, sexual orientation, or gender identity.            Thank you!     Thank you for choosing [facility]  for your care. Our goal is always to provide you with excellent care. Hearing back from our patients is one way we can continue to improve our services. Please take a few minutes to complete the written survey that you may receive in the mail after your visit with us. Thank you!             Your Updated Medication List - Protect others around you: Learn how to safely use, store and throw away your medicines at www.disposemymeds.org.          This list is accurate as of 8/31/18 12:33 PM.  Always use your most recent med list.                   Brand Name Dispense Instructions for use Diagnosis    prenatal multivitamin plus iron 27-0.8 MG Tabs per tablet      Take 1 tablet by mouth daily

## 2018-08-31 NOTE — PROGRESS NOTES
Doing well overall.  No bleeding, no regular ctx.   Headaches with vision changes at the time, stable. No progressive changes.  No worse.  Just not better.   BP still very normal.   Discussed  labor.   Cervix check and GBS next time.   Discussed warning signs for preeclampsia.  Will f/u in 2 week(s) or sooner with any concern for decreased fetal movement, vaginal bleeding, fluid leak, headache, vision change, severe nausea or vomiting, abdominal pain, increased edema or other concerns.   Melanie Bartlett MD

## 2018-09-14 ENCOUNTER — PRENATAL OFFICE VISIT (OUTPATIENT)
Dept: FAMILY MEDICINE | Facility: CLINIC | Age: 28
End: 2018-09-14
Payer: COMMERCIAL

## 2018-09-14 VITALS
OXYGEN SATURATION: 99 % | TEMPERATURE: 97 F | HEART RATE: 116 BPM | WEIGHT: 195.4 LBS | SYSTOLIC BLOOD PRESSURE: 138 MMHG | BODY MASS INDEX: 32.02 KG/M2 | DIASTOLIC BLOOD PRESSURE: 74 MMHG

## 2018-09-14 DIAGNOSIS — Z34.83 ENCOUNTER FOR SUPERVISION OF OTHER NORMAL PREGNANCY IN THIRD TRIMESTER: Primary | ICD-10-CM

## 2018-09-14 DIAGNOSIS — Z23 NEED FOR PROPHYLACTIC VACCINATION AND INOCULATION AGAINST INFLUENZA: ICD-10-CM

## 2018-09-14 PROCEDURE — 90471 IMMUNIZATION ADMIN: CPT | Performed by: FAMILY MEDICINE

## 2018-09-14 PROCEDURE — 99207 ZZC PRENATAL VISIT: CPT | Performed by: FAMILY MEDICINE

## 2018-09-14 PROCEDURE — 87653 STREP B DNA AMP PROBE: CPT | Performed by: FAMILY MEDICINE

## 2018-09-14 PROCEDURE — 90686 IIV4 VACC NO PRSV 0.5 ML IM: CPT | Performed by: FAMILY MEDICINE

## 2018-09-14 ASSESSMENT — PAIN SCALES - GENERAL: PAINLEVEL: NO PAIN (0)

## 2018-09-14 NOTE — PROGRESS NOTES

## 2018-09-14 NOTE — PROGRESS NOTES
Doing well overall.  No bleeding, no regular ctx.  No HA, vision changes, LOF.   GBS done today.  Flu shot given.   Discussed when to present for signs and symptoms of labor.   Discussed warning signs for preeclampsia.  Will f/u in 1 week(s) or sooner with any concern for decreased fetal movement, vaginal bleeding, fluid leak, headache, vision change, severe nausea or vomiting, abdominal pain, increased edema or other concerns.   Melanie Bartlett MD

## 2018-09-14 NOTE — MR AVS SNAPSHOT
After Visit Summary   9/14/2018    Neo Parra    MRN: [id number]           Patient Information     Date Of Birth          1990        Visit Information        Provider Department      9/14/2018 11:30 AM Melanie Bartlett MD [facility]        Today's Diagnoses     Need for prophylactic vaccination and inoculation against influenza    -  1       Follow-ups after your visit        Your next 10 appointments already scheduled     Sep 21, 2018 11:30 AM CDT   ESTABLISHED PRENATAL with Melanie Bartlett MD   [address] [address]   [phone]            Sep 28, 2018 11:30 AM CDT   ESTABLISHED PRENATAL with Melanie Bartlett MD   [facility] ([address] [address]   [phone]            Oct 05, 2018 11:30 AM CDT   ESTABLISHED PRENATAL with Melanie Bartlett MD   [facility] ([address] [address]   [phone]            Oct 12, 2018 11:30 AM CDT   ESTABLISHED PRENATAL with Melanie Bartlett MD   [facility] ([address] [address]   [phone]            Oct 19, 2018 11:30 AM CDT   ESTABLISHED PRENATAL with Melanie Bartlett MD   [facility] ([address] [address]   [phone]              Who to contact     If you have questions or need follow up information about today's clinic visit or your schedule please contact [facility] directly at [phone].  Normal or non-critical lab and imaging results will be communicated to you by MyChart, letter or phone within 4 business days after the clinic has received the results. If you do not hear from  us within 7 days, please contact the clinic through [employer] or phone. If you have a critical or abnormal lab result, we will notify you by phone as soon as possible.  Submit refill requests through [employer] or call your pharmacy and they will forward the refill request to us. Please allow 3 business days for your refill to be completed.          Additional Information About Your Visit        [employer] Technology[employer]isade [employer] Information     [employer] gives you secure access to your electronic health record. If you see a primary care provider, you can also send messages to your care team and make appointments. If you have questions, please call your primary care clinic.  If you do not have a primary care provider, please call [phone] and they will assist you.        Care EveryWhere ID     This is your Care EveryWhere ID. This could be used by other organizations to access your [place] medical records  [id number]        Your Vitals Were     Pulse Temperature Last Period Pulse Oximetry BMI (Body Mass Index)       116 97  F (36.1  C) (Temporal) 12/24/2017 99% 32.02 kg/m2        Blood Pressure from Last 3 Encounters:   09/14/18 138/74   08/31/18 122/62   08/17/18 122/66    Weight from Last 3 Encounters:   09/14/18 195 lb 6.4 oz (88.6 kg)   08/31/18 192 lb (87.1 kg)   08/17/18 192 lb 3.2 oz (87.2 kg)              We Performed the Following     FLU VACCINE, SPLIT VIRUS, IM (QUADRIVALENT) [27103]- >3 YRS     Vaccine Administration, Initial [92222]        Primary Care Provider Office Phone # Fax #    Melanie Lauren Bartlett [phone]       [address] [place] [address]        Equal Access to Services     [facility]: Hadii garett Estrella, waaxda luqadaha, qaybta kaalmaclarissa slaughter . So [facility] [phone].    ATENCIÓN: Si habla español, tiene a bright disposición servicios gratuitos de asistencia lingüística. Llame al [phone].    We comply with applicable federal  civil rights laws and [place] laws. We do not discriminate on the basis of race, color, national origin, age, disability, sex, sexual orientation, or gender identity.            Thank you!     Thank you for choosing [facility]  for your care. Our goal is always to provide you with excellent care. Hearing back from our patients is one way we can continue to improve our services. Please take a few minutes to complete the written survey that you may receive in the mail after your visit with us. Thank you!             Your Updated Medication List - Protect others around you: Learn how to safely use, store and throw away your medicines at www.disposemymeds.org.          This list is accurate as of 9/14/18 12:23 PM.  Always use your most recent med list.                   Brand Name Dispense Instructions for use Diagnosis    prenatal multivitamin plus iron 27-0.8 MG Tabs per tablet      Take 1 tablet by mouth daily

## 2018-09-15 LAB
GP B STREP DNA SPEC QL NAA+PROBE: NEGATIVE
SPECIMEN SOURCE: NORMAL

## 2018-09-21 ENCOUNTER — PRENATAL OFFICE VISIT (OUTPATIENT)
Dept: FAMILY MEDICINE | Facility: CLINIC | Age: 28
End: 2018-09-21
Payer: COMMERCIAL

## 2018-09-21 VITALS
TEMPERATURE: 98.7 F | BODY MASS INDEX: 32.15 KG/M2 | SYSTOLIC BLOOD PRESSURE: 122 MMHG | WEIGHT: 196.2 LBS | DIASTOLIC BLOOD PRESSURE: 78 MMHG | HEART RATE: 82 BPM

## 2018-09-21 DIAGNOSIS — Z34.80 SUPERVISION OF OTHER NORMAL PREGNANCY, ANTEPARTUM: Primary | ICD-10-CM

## 2018-09-21 PROCEDURE — 99207 ZZC PRENATAL VISIT: CPT | Performed by: FAMILY MEDICINE

## 2018-09-21 ASSESSMENT — PAIN SCALES - GENERAL: PAINLEVEL: NO PAIN (0)

## 2018-09-21 NOTE — PROGRESS NOTES
Doing well overall.  No bleeding, no regular ctx. No leak of fluid, no headache, vision changes.   GBS negative.   Baby active.    Discussed when to present for signs and symptoms of labor.   Discussed warning signs for preeclampsia.  Will f/u in 1 week(s) or sooner with any concern for decreased fetal movement, vaginal bleeding, fluid leak, headache, vision change, severe nausea or vomiting, abdominal pain, increased edema or other concerns.   Melanie Bartlett MD

## 2018-09-21 NOTE — MR AVS SNAPSHOT
After Visit Summary   9/21/2018    Neo Parra    MRN: [id number]           Patient Information     Date Of Birth          1990        Visit Information        Provider Department      9/21/2018 11:30 AM Melanie Bartlett MD [facility]        Today's Diagnoses     Supervision of other normal pregnancy, antepartum    -  1       Follow-ups after your visit        Your next 10 appointments already scheduled     Sep 28, 2018 11:30 AM CDT   ESTABLISHED PRENATAL with Melanie Bartlett MD   [facility] ([address] [address]   [phone]            Oct 05, 2018 11:30 AM CDT   ESTABLISHED PRENATAL with Melanie Bartlett MD   [facility] ([address] [address]   [phone]            Oct 12, 2018 11:30 AM CDT   ESTABLISHED PRENATAL with Melanie Bartlett MD   [facility] ([address] [address] [phone]            Oct 19, 2018 11:30 AM CDT   ESTABLISHED PRENATAL with Melanie Bartlett MD   [facility] ([address] [address] [phone]              Who to contact     If you have questions or need follow up information about today's clinic visit or your schedule please contact [facility] directly at [phone].  Normal or non-critical lab and imaging results will be communicated to you by MyChart, letter or phone within 4 business days after the clinic has received the results. If you do not hear from us within 7 days, please contact the clinic through MyChart or phone. If you have a critical or abnormal lab result, we will notify you by phone as soon as possible.  Submit refill requests through View [employer]w or call your pharmacy and  they will forward the refill request to us. Please allow 3 business days for your refill to be completed.          Additional Information About Your Visit        OneCloud Labshart Information     Vinny gives you secure access to your electronic health record. If you see a primary care provider, you can also send messages to your care team and make appointments. If you have questions, please call your primary care clinic.  If you do not have a primary care provider, please call [phone] and they will assist you.        Care EveryWhere ID     This is your Care EveryWhere ID. This could be used by other organizations to access your [place] medical records  [id number]        Your Vitals Were     Pulse Temperature Last Period BMI (Body Mass Index)          82 98.7  F (37.1  C) (Temporal) 12/24/2017 32.15 kg/m2         Blood Pressure from Last 3 Encounters:   09/21/18 122/78   09/14/18 138/74   08/31/18 122/62    Weight from Last 3 Encounters:   09/21/18 196 lb 3.2 oz (89 kg)   09/14/18 195 lb 6.4 oz (88.6 kg)   08/31/18 192 lb (87.1 kg)              Today, you had the following     No orders found for display       Primary Care Provider Office Phone # Fax #    Melanie Lauren Bartlett [phone] [address] [place] [address]        Equal Access to Services     RIA LOVE : Hadii aad ku hadasho Soomaali, waaxda luqadaha, qaybta kaalmada adeegyada, clarissa duarte. So [facility] [phone].    ATENCIÓN: Si habla español, tiene a bright disposición servicios gratuitos de asistencia lingüística. Llame al [phone].    We comply with applicable federal civil rights laws and [place] laws. We do not discriminate on the basis of race, color, national origin, age, disability, sex, sexual orientation, or gender identity.            Thank you!     Thank you for choosing [facility]  for your care. Our goal is always to provide you with excellent care. Hearing  back from our patients is one way we can continue to improve our services. Please take a few minutes to complete the written survey that you may receive in the mail after your visit with us. Thank you!             Your Updated Medication List - Protect others around you: Learn how to safely use, store and throw away your medicines at www.disposemymeds.org.          This list is accurate as of 9/21/18  2:26 PM.  Always use your most recent med list.                   Brand Name Dispense Instructions for use Diagnosis    prenatal multivitamin plus iron 27-0.8 MG Tabs per tablet      Take 1 tablet by mouth daily

## 2018-09-26 PROBLEM — Z34.80 SUPERVISION OF OTHER NORMAL PREGNANCY, ANTEPARTUM: Status: RESOLVED | Noted: 2018-03-12 | Resolved: 2018-09-26

## 2018-09-26 PROBLEM — Z34.83 ENCOUNTER FOR SUPERVISION OF OTHER NORMAL PREGNANCY IN THIRD TRIMESTER: Status: ACTIVE | Noted: 2018-09-26

## 2018-09-28 ENCOUNTER — PRENATAL OFFICE VISIT (OUTPATIENT)
Dept: FAMILY MEDICINE | Facility: CLINIC | Age: 28
End: 2018-09-28
Payer: COMMERCIAL

## 2018-09-28 VITALS
HEART RATE: 115 BPM | OXYGEN SATURATION: 99 % | BODY MASS INDEX: 32.55 KG/M2 | WEIGHT: 198.6 LBS | SYSTOLIC BLOOD PRESSURE: 148 MMHG | DIASTOLIC BLOOD PRESSURE: 90 MMHG | TEMPERATURE: 98.2 F

## 2018-09-28 DIAGNOSIS — O13.3 PREGNANCY-INDUCED HYPERTENSION IN THIRD TRIMESTER: ICD-10-CM

## 2018-09-28 DIAGNOSIS — Z34.83 ENCOUNTER FOR SUPERVISION OF OTHER NORMAL PREGNANCY IN THIRD TRIMESTER: Primary | ICD-10-CM

## 2018-09-28 LAB
ALT SERPL W P-5'-P-CCNC: 13 U/L (ref 0–50)
ANION GAP SERPL CALCULATED.3IONS-SCNC: 8 MMOL/L (ref 3–14)
AST SERPL W P-5'-P-CCNC: 15 U/L (ref 0–45)
BUN SERPL-MCNC: 6 MG/DL (ref 7–30)
CALCIUM SERPL-MCNC: 8.3 MG/DL (ref 8.5–10.1)
CHLORIDE SERPL-SCNC: 108 MMOL/L (ref 94–109)
CO2 SERPL-SCNC: 24 MMOL/L (ref 20–32)
CREAT SERPL-MCNC: 0.62 MG/DL (ref 0.52–1.04)
CREAT UR-MCNC: 40 MG/DL
ERYTHROCYTE [DISTWIDTH] IN BLOOD BY AUTOMATED COUNT: 12.1 % (ref 10–15)
GFR SERPL CREATININE-BSD FRML MDRD: >90 ML/MIN/1.7M2
GLUCOSE SERPL-MCNC: 99 MG/DL (ref 70–99)
HCT VFR BLD AUTO: 34.4 % (ref 35–47)
HGB BLD-MCNC: 11.4 G/DL (ref 11.7–15.7)
MCH RBC QN AUTO: 28.7 PG (ref 26.5–33)
MCHC RBC AUTO-ENTMCNC: 33.1 G/DL (ref 31.5–36.5)
MCV RBC AUTO: 87 FL (ref 78–100)
PLATELET # BLD AUTO: 173 10E9/L (ref 150–450)
POTASSIUM SERPL-SCNC: 3.5 MMOL/L (ref 3.4–5.3)
PROT UR-MCNC: <0.05 G/L
PROT/CREAT 24H UR: NORMAL G/G CR (ref 0–0.2)
RBC # BLD AUTO: 3.97 10E12/L (ref 3.8–5.2)
SODIUM SERPL-SCNC: 140 MMOL/L (ref 133–144)
URATE SERPL-MCNC: 4.2 MG/DL (ref 2.6–6)
WBC # BLD AUTO: 8.1 10E9/L (ref 4–11)

## 2018-09-28 PROCEDURE — 36415 COLL VENOUS BLD VENIPUNCTURE: CPT | Performed by: FAMILY MEDICINE

## 2018-09-28 PROCEDURE — 84460 ALANINE AMINO (ALT) (SGPT): CPT | Performed by: FAMILY MEDICINE

## 2018-09-28 PROCEDURE — 84156 ASSAY OF PROTEIN URINE: CPT | Performed by: FAMILY MEDICINE

## 2018-09-28 PROCEDURE — 84550 ASSAY OF BLOOD/URIC ACID: CPT | Performed by: FAMILY MEDICINE

## 2018-09-28 PROCEDURE — 80048 BASIC METABOLIC PNL TOTAL CA: CPT | Performed by: FAMILY MEDICINE

## 2018-09-28 PROCEDURE — 99207 ZZC COMPLICATED OB VISIT: CPT | Performed by: FAMILY MEDICINE

## 2018-09-28 PROCEDURE — 85027 COMPLETE CBC AUTOMATED: CPT | Performed by: FAMILY MEDICINE

## 2018-09-28 PROCEDURE — 84450 TRANSFERASE (AST) (SGOT): CPT | Performed by: FAMILY MEDICINE

## 2018-09-28 ASSESSMENT — PAIN SCALES - GENERAL: PAINLEVEL: NO PAIN (0)

## 2018-09-28 NOTE — PROGRESS NOTES
Please call her and be sure she sees this message today.     Aylana, your labs are all fantastic. I'll wait to hear what your blood pressure is on Monday. Please stop in to the [facility] and then ask them to call me with your results.   Melanie Bartlett MD

## 2018-09-28 NOTE — PROGRESS NOTES
Concerns: her BP is elevated today. Rechecked after visit and still elevated slightly, see flow chart.   She feels puffy but only trace edema on exam.  Denies headache or vision change. No bleeding or fluid leak. Baby active.   Occasional contractions, nothing regular.   Will get labs today for preeclampsia, and if all normal, will monitor closely.   Will have her also recheck her BP on Monday.  Discussed possible need for early induction due to HTN.    Membranes stripped today, cervix favorable.   Discussed when to present for signs and symptoms of labor.   Discussed warning signs for preeclampsia.  Will f/u in 3 days or sooner with any concern for decreased fetal movement, vaginal bleeding, fluid leak, headache, vision change, severe nausea or vomiting, abdominal pain, increased edema or other concerns.   Melanie Bartlett MD

## 2018-09-28 NOTE — MR AVS SNAPSHOT
After Visit Summary   9/28/2018    Neo Parra    MRN: [id number]           Patient Information     Date Of Birth          1990        Visit Information        Provider Department      9/28/2018 11:30 AM Melanie Bartlett MD [facility]        Today's Diagnoses     Encounter for supervision of other normal pregnancy in third trimester    -  1    Pregnancy-induced hypertension in third trimester           Follow-ups after your visit        Your next 10 appointments already scheduled     Oct 05, 2018 11:30 AM CDT   ESTABLISHED PRENATAL with Melanie Bartlett MD   [facility] ([facility]    [address] [place] [address]   [phone]            Oct 12, 2018 11:30 AM CDT   ESTABLISHED PRENATAL with Melanie Bartlett MD   [facility] ([facility]    [address] [place] [address]   [phone]            Oct 19, 2018 11:30 AM CDT   ESTABLISHED PRENATAL with Melanie Bartlett MD   [facility] ([facility])    [address] [place] [address]   [phone]              Who to contact     If you have questions or need follow up information about today's clinic visit or your schedule please contact [facility] directly at [phone].  Normal or non-critical lab and imaging results will be communicated to you by MyChart, letter or phone within 4 business days after the clinic has received the results. If you do not hear from us within 7 days, please contact the clinic through MyChart or phone. If you have a critical or abnormal lab result, we will notify you by phone as soon as possible.  Submit refill requests through Onco[employer]ex or call your pharmacy and they will forward the refill request to us. Please allow 3 business days for your refill to be completed.          Additional Information About  Your Visit        [employer] Information     mGaadi gives you secure access to your electronic health record. If you see a primary care provider, you can also send messages to your care team and make appointments. If you have questions, please call your primary care clinic.  If you do not have a primary care provider, please call [phone] and they will assist you.        Care EveryWhere ID     This is your Care EveryWhere ID. This could be used by other organizations to access your [place] medical records  [id number]        Your Vitals Were     Pulse Temperature Last Period Pulse Oximetry BMI (Body Mass Index)       115 98.2  F (36.8  C) (Temporal) 12/24/2017 99% 32.55 kg/m2        Blood Pressure from Last 3 Encounters:   09/28/18 148/90   09/21/18 122/78   09/14/18 138/74    Weight from Last 3 Encounters:   09/28/18 198 lb 9.6 oz (90.1 kg)   09/21/18 196 lb 3.2 oz (89 kg)   09/14/18 195 lb 6.4 oz (88.6 kg)              We Performed the Following     ALT     AST     Basic metabolic panel     CBC with platelets     Creatinine urine calculation only     Protein  random urine with Creat Ratio     Uric acid        Primary Care Provider Office Phone # Fax #    Melanie Lauren Bartlett [phone] [address] [place] [address]        Equal Access to Services     RIA LOVE : Hadii aad ku hadasho Soomaali, waaxda luqadaha, qaybta kaalmada adeegyada, clarissa duarte. So [facility] [phone].    ATENCIÓN: Si habla español, tiene a bright disposición servicios gratuitos de asistencia lingüística. Llame al [phone].    We comply with applicable federal civil rights laws and [place] laws. We do not discriminate on the basis of race, color, national origin, age, disability, sex, sexual orientation, or gender identity.            Thank you!     Thank you for choosing [facility]  for your care. Our goal is always to provide you with excellent care.  Hearing back from our patients is one way we can continue to improve our services. Please take a few minutes to complete the written survey that you may receive in the mail after your visit with us. Thank you!             Your Updated Medication List - Protect others around you: Learn how to safely use, store and throw away your medicines at www.disposemymeds.org.          This list is accurate as of 9/28/18  1:25 PM.  Always use your most recent med list.                   Brand Name Dispense Instructions for use Diagnosis    prenatal multivitamin plus iron 27-0.8 MG Tabs per tablet      Take 1 tablet by mouth daily

## 2018-10-01 ENCOUNTER — TELEPHONE (OUTPATIENT)
Dept: FAMILY MEDICINE | Facility: CLINIC | Age: 28
End: 2018-10-01

## 2018-10-01 ENCOUNTER — ALLIED HEALTH/NURSE VISIT (OUTPATIENT)
Dept: FAMILY MEDICINE | Facility: OTHER | Age: 28
End: 2018-10-01
Payer: COMMERCIAL

## 2018-10-01 VITALS — HEART RATE: 88 BPM | SYSTOLIC BLOOD PRESSURE: 156 MMHG | DIASTOLIC BLOOD PRESSURE: 84 MMHG

## 2018-10-01 DIAGNOSIS — Z01.30 BP CHECK: Primary | ICD-10-CM

## 2018-10-01 DIAGNOSIS — O13.3 PREGNANCY-INDUCED HYPERTENSION IN THIRD TRIMESTER: ICD-10-CM

## 2018-10-01 DIAGNOSIS — O13.3 PREGNANCY-INDUCED HYPERTENSION IN THIRD TRIMESTER: Primary | ICD-10-CM

## 2018-10-01 LAB
ALP SERPL-CCNC: 163 U/L (ref 40–150)
ALT SERPL W P-5'-P-CCNC: 11 U/L (ref 0–50)
ANION GAP SERPL CALCULATED.3IONS-SCNC: 10 MMOL/L (ref 3–14)
AST SERPL W P-5'-P-CCNC: 14 U/L (ref 0–45)
BUN SERPL-MCNC: 5 MG/DL (ref 7–30)
CALCIUM SERPL-MCNC: 8.2 MG/DL (ref 8.5–10.1)
CHLORIDE SERPL-SCNC: 109 MMOL/L (ref 94–109)
CO2 SERPL-SCNC: 21 MMOL/L (ref 20–32)
CREAT SERPL-MCNC: 0.62 MG/DL (ref 0.52–1.04)
CREAT UR-MCNC: 40 MG/DL
ERYTHROCYTE [DISTWIDTH] IN BLOOD BY AUTOMATED COUNT: 12.3 % (ref 10–15)
GFR SERPL CREATININE-BSD FRML MDRD: >90 ML/MIN/1.7M2
GLUCOSE SERPL-MCNC: 70 MG/DL (ref 70–99)
HCT VFR BLD AUTO: 35.9 % (ref 35–47)
HGB BLD-MCNC: 11.7 G/DL (ref 11.7–15.7)
MCH RBC QN AUTO: 28.4 PG (ref 26.5–33)
MCHC RBC AUTO-ENTMCNC: 32.6 G/DL (ref 31.5–36.5)
MCV RBC AUTO: 87 FL (ref 78–100)
PLATELET # BLD AUTO: 177 10E9/L (ref 150–450)
POTASSIUM SERPL-SCNC: 3.7 MMOL/L (ref 3.4–5.3)
PROT UR-MCNC: <0.05 G/L
PROT/CREAT 24H UR: NORMAL G/G CR (ref 0–0.2)
RBC # BLD AUTO: 4.12 10E12/L (ref 3.8–5.2)
SODIUM SERPL-SCNC: 140 MMOL/L (ref 133–144)
URATE SERPL-MCNC: 3.9 MG/DL (ref 2.6–6)
WBC # BLD AUTO: 7.9 10E9/L (ref 4–11)

## 2018-10-01 PROCEDURE — 84450 TRANSFERASE (AST) (SGOT): CPT | Performed by: FAMILY MEDICINE

## 2018-10-01 PROCEDURE — 84460 ALANINE AMINO (ALT) (SGPT): CPT | Performed by: FAMILY MEDICINE

## 2018-10-01 PROCEDURE — 36415 COLL VENOUS BLD VENIPUNCTURE: CPT | Performed by: FAMILY MEDICINE

## 2018-10-01 PROCEDURE — 84550 ASSAY OF BLOOD/URIC ACID: CPT | Performed by: FAMILY MEDICINE

## 2018-10-01 PROCEDURE — 99207 ZZC NO CHARGE NURSE ONLY: CPT

## 2018-10-01 PROCEDURE — 84075 ASSAY ALKALINE PHOSPHATASE: CPT | Performed by: FAMILY MEDICINE

## 2018-10-01 PROCEDURE — 85027 COMPLETE CBC AUTOMATED: CPT | Performed by: FAMILY MEDICINE

## 2018-10-01 PROCEDURE — 80048 BASIC METABOLIC PNL TOTAL CA: CPT | Performed by: FAMILY MEDICINE

## 2018-10-01 PROCEDURE — 84156 ASSAY OF PROTEIN URINE: CPT | Performed by: FAMILY MEDICINE

## 2018-10-01 NOTE — LETTER
October 1, 2018      Neo Parra  [address] [facility] [address]        To Whom It May Concern:    Neo Parra  was seen on 10/1/2018.  Please excuse her due to clinic visit.        Sincerely,        Serena Pulliam MA     10/1/2018  ELISEO Hedrick MA

## 2018-10-01 NOTE — NURSING NOTE
Neo Parra is a 27 year old patient who comes in today for a Blood Pressure check.  Initial BP:  /84 (BP Location: Left arm, Patient Position: Sitting, Cuff Size: Adult Large)  Pulse 88  LMP 12/24/2017     88  Disposition: provider notified while patient in the clinic, pt sent to lab per PCP.     Serena Pulliam MA     10/1/2018

## 2018-10-01 NOTE — MR AVS SNAPSHOT
After Visit Summary   10/1/2018    Neo Parra    MRN: [id number]           Patient Information     Date Of Birth          1990        Visit Information        Provider Department      10/1/2018 11:30 AM ELISEO REID MA [facility]        Today's Diagnoses     BP check    -  1    Pregnancy-induced hypertension in third trimester           Follow-ups after your visit        Your next 10 appointments already scheduled     Oct 05, 2018 11:30 AM CDT   ESTABLISHED PRENATAL with Melanie Bartlett MD   [facility] ([address] [address]   [phone]            Oct 12, 2018 11:30 AM CDT   ESTABLISHED PRENATAL with Melanie Bartlett MD   [facility] ([address] [address]   [phone]            Oct 19, 2018 11:30 AM CDT   ESTABLISHED PRENATAL with Melanie Bartlett MD   [facility] ([address] [address]   [phone]              Who to contact     If you have questions or need follow up information about today's clinic visit or your schedule please contact [facility] directly at [phone].  Normal or non-critical lab and imaging results will be communicated to you by MyChart, letter or phone within 4 business days after the clinic has received the results. If you do not hear from us within 7 days, please contact the clinic through MyChart or phone. If you have a critical or abnormal lab result, we will notify you by phone as soon as possible.  Submit refill requests through LucidEra or call your pharmacy and they will forward the refill request to us. Please allow 3 business days for your refill to be completed.          Additional Information About Your Visit        [employer] Information     L[employer] gives you secure access to your  electronic health record. If you see a primary care provider, you can also send messages to your care team and make appointments. If you have questions, please call your primary care clinic.  If you do not have a primary care provider, please call [phone] and they will assist you.        Care EveryWhere ID     This is your Care EveryWhere ID. This could be used by other organizations to access your [place] medical records  [id number]        Your Vitals Were     Pulse Last Period                88 12/24/2017           Blood Pressure from Last 3 Encounters:   10/01/18 156/84   09/28/18 148/90   09/21/18 122/78    Weight from Last 3 Encounters:   09/28/18 198 lb 9.6 oz (90.1 kg)   09/21/18 196 lb 3.2 oz (89 kg)   09/14/18 195 lb 6.4 oz (88.6 kg)              We Performed the Following     Alkaline phosphatase     ALT     AST     Basic metabolic panel     CBC with platelets     Creatinine urine calculation only     Protein  random urine with Creat Ratio     Uric acid        Primary Care Provider Office Phone # Fax #    Melanie Lauren Bartlett [phone]        [address] [place] [address]        Equal Access to Services     RIA LOVE AH: Hadii aad ku hadasho Soomaali, waaxda luqadaha, qaybta kaalmada adeegyada, waxay hipolitoin haymichaeln xuan ni ah. So [facility] [phone].    ATENCIÓN: Si habla español, tiene a bright disposición servicios gratuitos de asistencia lingüística. Llame al [phone].    We comply with applicable federal civil rights laws and [place] laws. We do not discriminate on the basis of race, color, national origin, age, disability, sex, sexual orientation, or gender identity.            Thank you!     Thank you for choosing [facility]  for your care. Our goal is always to provide you with excellent care. Hearing back from our patients is one way we can continue to improve our services. Please take a few minutes to complete the written survey that  you may receive in the mail after your visit with us. Thank you!             Your Updated Medication List - Protect others around you: Learn how to safely use, store and throw away your medicines at www.disposemymeds.org.          This list is accurate as of 10/1/18  3:44 PM.  Always use your most recent med list.                   Brand Name Dispense Instructions for use Diagnosis    prenatal multivitamin plus iron 27-0.8 MG Tabs per tablet      Take 1 tablet by mouth daily

## 2018-10-01 NOTE — TELEPHONE ENCOUNTER
BP is elevated today in [place], will have her get preeclampsia labs drawn. See orders. Will notify with results and develop induction plan if needed.   Melanie Bartlett MD

## 2018-10-05 ENCOUNTER — APPOINTMENT (OUTPATIENT)
Dept: ULTRASOUND IMAGING | Facility: CLINIC | Age: 28
End: 2018-10-05
Attending: FAMILY MEDICINE
Payer: COMMERCIAL

## 2018-10-05 ENCOUNTER — PRENATAL OFFICE VISIT (OUTPATIENT)
Dept: FAMILY MEDICINE | Facility: CLINIC | Age: 28
End: 2018-10-05
Payer: COMMERCIAL

## 2018-10-05 ENCOUNTER — HOSPITAL ENCOUNTER (OUTPATIENT)
Facility: CLINIC | Age: 28
Discharge: HOME | End: 2018-10-05
Attending: FAMILY MEDICINE | Admitting: FAMILY MEDICINE
Payer: COMMERCIAL

## 2018-10-05 VITALS — TEMPERATURE: 98.6 F | DIASTOLIC BLOOD PRESSURE: 90 MMHG | HEART RATE: 97 BPM | SYSTOLIC BLOOD PRESSURE: 141 MMHG

## 2018-10-05 VITALS
OXYGEN SATURATION: 100 % | WEIGHT: 197.6 LBS | TEMPERATURE: 97.5 F | DIASTOLIC BLOOD PRESSURE: 102 MMHG | HEART RATE: 120 BPM | SYSTOLIC BLOOD PRESSURE: 164 MMHG | BODY MASS INDEX: 32.38 KG/M2

## 2018-10-05 DIAGNOSIS — H53.9 VISION CHANGES: ICD-10-CM

## 2018-10-05 DIAGNOSIS — O13.3 GESTATIONAL HYPERTENSION, THIRD TRIMESTER: ICD-10-CM

## 2018-10-05 DIAGNOSIS — Z34.83 ENCOUNTER FOR SUPERVISION OF OTHER NORMAL PREGNANCY IN THIRD TRIMESTER: Primary | ICD-10-CM

## 2018-10-05 PROBLEM — Z36.89 ENCOUNTER FOR TRIAGE IN PREGNANT PATIENT: Status: ACTIVE | Noted: 2018-10-05

## 2018-10-05 LAB
ABO + RH BLD: NORMAL
ABO + RH BLD: NORMAL
ALBUMIN SERPL-MCNC: 3.1 G/DL (ref 3.4–5)
ALP SERPL-CCNC: 175 U/L (ref 40–150)
ALT SERPL W P-5'-P-CCNC: 13 U/L (ref 0–50)
ANION GAP SERPL CALCULATED.3IONS-SCNC: 9 MMOL/L (ref 3–14)
AST SERPL W P-5'-P-CCNC: 16 U/L (ref 0–45)
BILIRUB SERPL-MCNC: 0.4 MG/DL (ref 0.2–1.3)
BLD GP AB SCN SERPL QL: NORMAL
BLOOD BANK CMNT PATIENT-IMP: NORMAL
BUN SERPL-MCNC: 5 MG/DL (ref 7–30)
CALCIUM SERPL-MCNC: 8.6 MG/DL (ref 8.5–10.1)
CHLORIDE SERPL-SCNC: 106 MMOL/L (ref 94–109)
CO2 SERPL-SCNC: 24 MMOL/L (ref 20–32)
CREAT SERPL-MCNC: 0.69 MG/DL (ref 0.52–1.04)
CREAT UR-MCNC: 57 MG/DL
ERYTHROCYTE [DISTWIDTH] IN BLOOD BY AUTOMATED COUNT: 12.4 % (ref 10–15)
GFR SERPL CREATININE-BSD FRML MDRD: >90 ML/MIN/1.7M2
GLUCOSE SERPL-MCNC: 72 MG/DL (ref 70–99)
HCT VFR BLD AUTO: 36.5 % (ref 35–47)
HGB BLD-MCNC: 12 G/DL (ref 11.7–15.7)
MCH RBC QN AUTO: 28.4 PG (ref 26.5–33)
MCHC RBC AUTO-ENTMCNC: 32.9 G/DL (ref 31.5–36.5)
MCV RBC AUTO: 86 FL (ref 78–100)
PLATELET # BLD AUTO: 193 10E9/L (ref 150–450)
POTASSIUM SERPL-SCNC: 3.3 MMOL/L (ref 3.4–5.3)
PROT SERPL-MCNC: 7.1 G/DL (ref 6.8–8.8)
PROT UR-MCNC: <0.05 G/L
PROT/CREAT 24H UR: NORMAL G/G CR (ref 0–0.2)
RBC # BLD AUTO: 4.23 10E12/L (ref 3.8–5.2)
SODIUM SERPL-SCNC: 139 MMOL/L (ref 133–144)
SPECIMEN EXP DATE BLD: NORMAL
URATE SERPL-MCNC: 4.3 MG/DL (ref 2.6–6)
WBC # BLD AUTO: 8.3 10E9/L (ref 4–11)

## 2018-10-05 PROCEDURE — G0463 HOSPITAL OUTPT CLINIC VISIT: HCPCS | Mod: 25

## 2018-10-05 PROCEDURE — 99207 ZZC COMPLICATED OB VISIT: CPT | Performed by: FAMILY MEDICINE

## 2018-10-05 PROCEDURE — 84156 ASSAY OF PROTEIN URINE: CPT | Performed by: FAMILY MEDICINE

## 2018-10-05 PROCEDURE — 86900 BLOOD TYPING SEROLOGIC ABO: CPT | Performed by: FAMILY MEDICINE

## 2018-10-05 PROCEDURE — 84550 ASSAY OF BLOOD/URIC ACID: CPT | Performed by: FAMILY MEDICINE

## 2018-10-05 PROCEDURE — 36415 COLL VENOUS BLD VENIPUNCTURE: CPT | Performed by: FAMILY MEDICINE

## 2018-10-05 PROCEDURE — 59025 FETAL NON-STRESS TEST: CPT | Mod: 59

## 2018-10-05 PROCEDURE — 85027 COMPLETE CBC AUTOMATED: CPT | Performed by: FAMILY MEDICINE

## 2018-10-05 PROCEDURE — 86780 TREPONEMA PALLIDUM: CPT | Performed by: FAMILY MEDICINE

## 2018-10-05 PROCEDURE — 86901 BLOOD TYPING SEROLOGIC RH(D): CPT | Performed by: FAMILY MEDICINE

## 2018-10-05 PROCEDURE — 86850 RBC ANTIBODY SCREEN: CPT | Performed by: FAMILY MEDICINE

## 2018-10-05 PROCEDURE — 80053 COMPREHEN METABOLIC PANEL: CPT | Performed by: FAMILY MEDICINE

## 2018-10-05 PROCEDURE — 76819 FETAL BIOPHYS PROFIL W/O NST: CPT

## 2018-10-05 RX ORDER — METHYLERGONOVINE MALEATE 0.2 MG/ML
200 INJECTION INTRAVENOUS
Status: CANCELLED | OUTPATIENT
Start: 2018-10-05

## 2018-10-05 RX ORDER — NIFEDIPINE 10 MG/1
10 CAPSULE ORAL
Status: CANCELLED | OUTPATIENT
Start: 2018-10-05

## 2018-10-05 RX ORDER — OXYTOCIN/0.9 % SODIUM CHLORIDE 30/500 ML
1-24 PLASTIC BAG, INJECTION (ML) INTRAVENOUS CONTINUOUS
Status: CANCELLED | OUTPATIENT
Start: 2018-10-05

## 2018-10-05 RX ORDER — HYDRALAZINE HYDROCHLORIDE 20 MG/ML
10 INJECTION INTRAMUSCULAR; INTRAVENOUS
Status: CANCELLED | OUTPATIENT
Start: 2018-10-05

## 2018-10-05 RX ORDER — NALOXONE HYDROCHLORIDE 0.4 MG/ML
.1-.4 INJECTION, SOLUTION INTRAMUSCULAR; INTRAVENOUS; SUBCUTANEOUS
Status: CANCELLED | OUTPATIENT
Start: 2018-10-05

## 2018-10-05 RX ORDER — OXYTOCIN/0.9 % SODIUM CHLORIDE 30/500 ML
100-340 PLASTIC BAG, INJECTION (ML) INTRAVENOUS CONTINUOUS PRN
Status: CANCELLED | OUTPATIENT
Start: 2018-10-05

## 2018-10-05 RX ORDER — ACETAMINOPHEN 325 MG/1
650 TABLET ORAL EVERY 4 HOURS PRN
Status: CANCELLED | OUTPATIENT
Start: 2018-10-05

## 2018-10-05 RX ORDER — FENTANYL CITRATE 50 UG/ML
50-100 INJECTION, SOLUTION INTRAMUSCULAR; INTRAVENOUS
Status: CANCELLED | OUTPATIENT
Start: 2018-10-05

## 2018-10-05 RX ORDER — OXYCODONE AND ACETAMINOPHEN 5; 325 MG/1; MG/1
1 TABLET ORAL
Status: CANCELLED | OUTPATIENT
Start: 2018-10-05

## 2018-10-05 RX ORDER — OXYTOCIN 10 [USP'U]/ML
10 INJECTION, SOLUTION INTRAMUSCULAR; INTRAVENOUS
Status: CANCELLED | OUTPATIENT
Start: 2018-10-05

## 2018-10-05 RX ORDER — SODIUM CHLORIDE, SODIUM LACTATE, POTASSIUM CHLORIDE, CALCIUM CHLORIDE 600; 310; 30; 20 MG/100ML; MG/100ML; MG/100ML; MG/100ML
INJECTION, SOLUTION INTRAVENOUS CONTINUOUS
Status: CANCELLED | OUTPATIENT
Start: 2018-10-05

## 2018-10-05 RX ORDER — LORAZEPAM 2 MG/ML
2 INJECTION INTRAMUSCULAR
Status: CANCELLED | OUTPATIENT
Start: 2018-10-05

## 2018-10-05 RX ORDER — IBUPROFEN 800 MG/1
800 TABLET, FILM COATED ORAL
Status: CANCELLED | OUTPATIENT
Start: 2018-10-05

## 2018-10-05 RX ORDER — LIDOCAINE 40 MG/G
CREAM TOPICAL
Status: CANCELLED | OUTPATIENT
Start: 2018-10-05

## 2018-10-05 RX ORDER — MAGNESIUM SULFATE HEPTAHYDRATE 40 MG/ML
2 INJECTION, SOLUTION INTRAVENOUS
Status: CANCELLED | OUTPATIENT
Start: 2018-10-05

## 2018-10-05 RX ORDER — ONDANSETRON 2 MG/ML
4 INJECTION INTRAMUSCULAR; INTRAVENOUS EVERY 6 HOURS PRN
Status: CANCELLED | OUTPATIENT
Start: 2018-10-05

## 2018-10-05 RX ORDER — CARBOPROST TROMETHAMINE 250 UG/ML
250 INJECTION, SOLUTION INTRAMUSCULAR
Status: CANCELLED | OUTPATIENT
Start: 2018-10-05

## 2018-10-05 RX ORDER — MAGNESIUM SULFATE HEPTAHYDRATE 40 MG/ML
4 INJECTION, SOLUTION INTRAVENOUS
Status: CANCELLED | OUTPATIENT
Start: 2018-10-05

## 2018-10-05 RX ORDER — HYDRALAZINE HYDROCHLORIDE 20 MG/ML
5 INJECTION INTRAMUSCULAR; INTRAVENOUS
Status: CANCELLED | OUTPATIENT
Start: 2018-10-05

## 2018-10-05 RX ORDER — MAGNESIUM SULFATE HEPTAHYDRATE 500 MG/ML
4 INJECTION, SOLUTION INTRAMUSCULAR; INTRAVENOUS
Status: CANCELLED | OUTPATIENT
Start: 2018-10-05

## 2018-10-05 ASSESSMENT — PAIN SCALES - GENERAL: PAINLEVEL: NO PAIN (0)

## 2018-10-05 NOTE — DISCHARGE INSTRUCTIONS
OB Triage Discharge Instructions    Diet:  Regular      Activity:  Bedrest tonight      Other Special Instructions: return to birthplace tomorrow at 7:30am for induction    Reason for being seen in L&D: high BP    Treatment/procedures performed in L&D: monitoring, BP checks, biophysical    Call the Birthplace at [phone] if you have:  ? 5 or more contractions in one hour  ? Leaking of fluid from your vaginal area  ? Decreased fetal movement (follow kick count instructions)  ? Bleeding from your vaginal area  ? Swelling in your face, or increased swelling in your hands or legs  ? Headaches or vision problems such as blurring or seeing spots or starts  ? Nausea or vomiting lasting for more than 24 hours  ? An increase in weight (5lbs/week)  ? Epigastric pain (sometimes confused with heartburn that does not go away or a very bad stomach ache)    If you have any questions, please follow up with your doctor.        Patient Signature: ______________________________________________________________  By signing the above I acknowledge that a nurse or my care provider has explained the instruction to me and I have had any questions regarding my care explained to me.        Discharge Nurse Signature: _______________________________ 10/5/2018  6:04 PM    Method of discharge: walking    Accompanied by: self  Time of discharge: 1810

## 2018-10-05 NOTE — PROGRESS NOTES
S:  Discharge from triage.   A:  FHT's 145, , Moderate variability, Accels present, no decels noted.NST reactive, biophysical 8/8 Contractions occasional, pt states mild.  Cervical exam done in clinic  R:  Written and verbal D/C instruction provided. Pt. Verbalized understanding of D/C instructions and will follow up with birthplace tomorrow at 0730 for induction as previously scheduled.   Verbalizes understanding that she will call or return to the Birthplace with any further questions or concerns.   Pt. D/C'd via w/c with nurse/self    Nursing Discharge Checklist  Discharge order entered: Yes  Patient care order entered: Yes  Charges entered: Yes  IV start and stop times have been documented in Epic? No  NST start and stop times have been documented in Epic Doc F/S? Yes    Discharged in w/c to front door, walked short distance to car

## 2018-10-05 NOTE — IP AVS SNAPSHOT
[facility]    [address]     [place] [address]    Phone:  [phone]                                       After Visit Summary   10/5/2018    Neo Parra    MRN: [id number]           After Visit Summary Signature Page     I have received my discharge instructions, and my questions have been answered. I have discussed any challenges I see with this plan with the nurse or doctor.    ..........................................................................................................................................  Patient/Patient Representative Signature      ..........................................................................................................................................  Patient Representative Print Name and Relationship to Patient    ..................................................               ................................................  Date                                   Time    ..........................................................................................................................................  Reviewed by Signature/Title    ...................................................              ..............................................  Date                                               Time          22EPIC Rev 08/18

## 2018-10-05 NOTE — PROGRESS NOTES
Doing well. Resting.   Sent up from clinic today due to HTN at her routine prenatal visit. Also she had some vision changes last night and doesn't feel well overall.   Her labs came back all normal as follows:   Results for orders placed or performed in visit on 10/05/18   Comprehensive metabolic panel (BMP + Alb, Alk Phos, ALT, AST, Total. Bili, TP)   Result Value Ref Range    Sodium 139 133 - 144 mmol/L    Potassium 3.3 (L) 3.4 - 5.3 mmol/L    Chloride 106 94 - 109 mmol/L    Carbon Dioxide 24 20 - 32 mmol/L    Anion Gap 9 3 - 14 mmol/L    Glucose 72 70 - 99 mg/dL    Urea Nitrogen 5 (L) 7 - 30 mg/dL    Creatinine 0.69 0.52 - 1.04 mg/dL    GFR Estimate >90 >60 mL/min/1.7m2    GFR Estimate If Black >90 >60 mL/min/1.7m2    Calcium 8.6 8.5 - 10.1 mg/dL    Bilirubin Total 0.4 0.2 - 1.3 mg/dL    Albumin 3.1 (L) 3.4 - 5.0 g/dL    Protein Total 7.1 6.8 - 8.8 g/dL    Alkaline Phosphatase 175 (H) 40 - 150 U/L    ALT 13 0 - 50 U/L    AST 16 0 - 45 U/L   Uric acid   Result Value Ref Range    Uric Acid 4.3 2.6 - 6.0 mg/dL   CBC with platelets   Result Value Ref Range    WBC 8.3 4.0 - 11.0 10e9/L    RBC Count 4.23 3.8 - 5.2 10e12/L    Hemoglobin 12.0 11.7 - 15.7 g/dL    Hematocrit 36.5 35.0 - 47.0 %    MCV 86 78 - 100 fl    MCH 28.4 26.5 - 33.0 pg    MCHC 32.9 31.5 - 36.5 g/dL    RDW 12.4 10.0 - 15.0 %    Platelet Count 193 150 - 450 10e9/L   Protein  random urine with Creat Ratio   Result Value Ref Range    Protein Random Urine <0.05 g/L    Protein Total Urine g/gr Creatinine Unable to calculate due to low value 0 - 0.2 g/g Cr   Creatinine urine calculation only   Result Value Ref Range    Creatinine Urine 57 mg/dL   ABO/Rh type and screen   Result Value Ref Range    ABO A     RH(D) Pos     Antibody Screen Neg     Test Valid Only At [facility]        Specimen Expires 10/08/2018       Vitals:    10/05/18 1219 10/05/18 1222 10/05/18 1234 10/05/18 1250   BP: 128/74 146/83 138/88 135/84   Pulse: 93  97    Temp:  98.6  F (37  C)      TempSrc: Oral         Her BP has been normal, improved from her clinic visit.     FHT 140s with accels. She had a flat tracing early on but then reactive strip, no decels, occasional ctx. She feels them but not strong or painful.     We discussed that I am reassured by normal BP at rest and normal labs and normal fetal tracing.   Based on her vision changes and overall sense of not feeling well, we could consider medical induction is warranted to prevent recurrence or progression to preeclampsia.   Also she is favorable enough to consider elective induction at this time, but being a weekend would prefer to induce her on Monday if elective.   She is going to consider what she would like, talk to her , and I am going to get BPP and afterward have her up and walking around and make sure that her BP and her fetal status remains normal. Otherwise I will recommend she be induced today.   Melanie Bartlett MD

## 2018-10-05 NOTE — PROGRESS NOTES
"  Concerns: Patient states she is feeling weird today, like not herself. Last night was in the tub and went to sit up and saw stars/flashing lights.   Now today feels \"weird\" not like herself. Like she might pass out.  No further vision changes today, but some nausea. No emesis.   Some contractions, random. No bleeding no fluid leak.   On exam her BP is high.   Will have her upstairs to the birthplace for rest in a dark quiet room and recheck BP, fetal monitoring. Will stop in lab for preeclampsia labs on the way up.   Melanie Bartlett MD  "

## 2018-10-05 NOTE — MR AVS SNAPSHOT
After Visit Summary   10/5/2018    Neo Parra    MRN: [id number]           Patient Information     Date Of Birth          1990        Visit Information        Provider Department      10/5/2018 11:30 AM Melanie Bartlett MD [facility]        Today's Diagnoses     Encounter for supervision of other normal pregnancy in third trimester    -  1    Vision changes        Gestational hypertension, third trimester           Follow-ups after your visit        Your next 10 appointments already scheduled     Oct 12, 2018 11:30 AM CDT   ESTABLISHED PRENATAL with Melanie Bartlett MD   [facility]    [address] [place] [address] [phone]            Oct 19, 2018 11:30 AM CDT   ESTABLISHED PRENATAL with Melanie Bartlett MD   [facility]    [address] [place] [id number] [phone]              Future tests that were ordered for you today     Open Standing Orders        Priority Remaining Interval Expires Ordered    UA with Microscopic reflex to Culture STAT 1/1 CONDITIONAL X 1 STAT  10/5/2018            Who to contact     If you have questions or need follow up information about today's clinic visit or your schedule please contact [facility] directly at [phone].  Normal or non-critical lab and imaging results will be communicated to you by MyChart, letter or phone within 4 business days after the clinic has received the results. If you do not hear from us within 7 days, please contact the clinic through MyChart or phone. If you have a critical or abnormal lab result, we will notify you by phone as soon as possible.  Submit refill requests through LiveRe or call your pharmacy and they will forward the refill request to us. Please allow 3 business days for your refill to be completed.          Additional  Information About Your Visit        [employer] Information     [employer] gives you secure access to your electronic health record. If you see a primary care provider, you can also send messages to your care team and make appointments. If you have questions, please call your primary care clinic.  If you do not have a primary care provider, please call [phone] and they will assist you.        Care EveryWhere ID     This is your Care EveryWhere ID. This could be used by other organizations to access your [place] medical records  [id number]        Your Vitals Were     Pulse Temperature Last Period Pulse Oximetry BMI (Body Mass Index)       120 97.5  F (36.4  C) (Temporal) 12/24/2017 100% 32.38 kg/m2        Blood Pressure from Last 3 Encounters:   10/05/18 135/84   10/05/18 (!) 164/102   10/01/18 156/84    Weight from Last 3 Encounters:   10/05/18 197 lb 9.6 oz (89.6 kg)   09/28/18 198 lb 9.6 oz (90.1 kg)   09/21/18 196 lb 3.2 oz (89 kg)              We Performed the Following     ABO/Rh type and screen     CBC with platelets     Comprehensive metabolic panel (BMP + Alb, Alk Phos, ALT, AST, Total. Bili, TP)     Creatinine urine calculation only     Protein  random urine with Creat Ratio     Treponema Abs w Reflex to RPR and Titer     Uric acid        Primary Care Provider Office Phone # Fax #    Melanie Lauren Bartlett [phone] [address] [place] [address]        Equal Access to Services     RIA LOVE AH: Hadii aad ku hadasho Soomaali, waaxda luqadaha, qaybta kaalmada adeegyada, clarissa idiin haymichaeln xuan yang'vonda duarte. So [facility] [phone].    ATENCIÓN: Si habla español, tiene a bright disposición servicios gratuitos de asistencia lingüística. Llame al [phone].    We comply with applicable federal civil rights laws and [place] laws. We do not discriminate on the basis of race, color, national origin, age, disability, sex, sexual orientation, or gender identity.             Thank you!     Thank you for choosing [facility]  for your care. Our goal is always to provide you with excellent care. Hearing back from our patients is one way we can continue to improve our services. Please take a few minutes to complete the written survey that you may receive in the mail after your visit with us. Thank you!             Your Updated Medication List - Protect others around you: Learn how to safely use, store and throw away your medicines at www.disposemymeds.org.          This list is accurate as of 10/5/18 12:05 PM.  Always use your most recent med list.                   Brand Name Dispense Instructions for use Diagnosis    prenatal multivitamin plus iron 27-0.8 MG Tabs per tablet      Take 1 tablet by mouth daily

## 2018-10-05 NOTE — PROGRESS NOTES
"S: Triage Arrival  B: Neo is a   27 y.o.  @ 39w 0d who presents from clinic with high BP, labs [place] this wek  A: EFM applied. FHT's135 with moderate variability, accels present, no decels noted on strip. Contractions occas, mild. Denies headache. \"Not feeling like myself today\"  R: Monitoring, recheck BP, UA  "

## 2018-10-05 NOTE — PROGRESS NOTES
Doing well. She feels fine.   She feels like she might be getting a headache but not severe. No vision changes. No n/v     Vitals:    10/05/18 1222 10/05/18 1234 10/05/18 1250 10/05/18 1600   BP: 146/83 138/88 135/84 135/82   Pulse:  97     Temp:       TempSrc:          She has been up, walking, eating.      Ultrasound was done and BPP was 8/8.     We discussed issues with elevated blood pressure. She had only 1 elevated BP but did have vision symptoms the day prior. She is 39 weeks and has a favorable cervix, I recommend induction and she agrees, prefers that since she doesn't feel like herself.   We discussed starting pitocin now vs in the am. Because it's a weekend night and the risk for complications in the middle of the night with decreased staff is a concern, we are going to have her back in the early am and start pitocin tomorrow.    She knows to be quiet tonight, rest and elevate her feet.  She knows to return sooner with any headache, n/v, recurrent vision changes or any other concerns.   Melanie Bartlett MD

## 2018-10-05 NOTE — IP AVS SNAPSHOT
MRN:[id number]                      After Visit Summary   10/5/2018    Neo Parra    MRN: [id number]           Thank you!     Thank you for choosing [facility] for your care. Our goal is always to provide you with excellent care. Hearing back from our patients is one way we can continue to improve our services. Please take a few minutes to complete the written survey that you may receive in the mail after you visit with us. Thank you!        Patient Information     Date Of Birth          1990        Designated Caregiver       Most Recent Value    Caregiver    Will someone help with your care after discharge? no      About your hospital stay     You were admitted on:  October 5, 2018 You last received care in the:  [facility]    You were discharged on:  October 5, 2018       Who to Call     For medical emergencies, please call 911.  For non-urgent questions about your medical care, please call your primary care provider or clinic, [phone]          Attending Provider     Provider Specialty    Melanie Bartlett MD [facility]       Primary Care Provider Office Phone # Fax #    Melanie Bartlett [phone]      Your next 10 appointments already scheduled     Oct 12, 2018 11:30 AM CDT   ESTABLISHED PRENATAL with Melanie Bartlett MD   [facility] ([address] [address] [phone]            Oct 19, 2018 11:30 AM CDT   ESTABLISHED PRENATAL with Melanie Bartlett MD   [facility] ([address] [id number] [phone]              Further instructions from your care team                OB Triage Discharge Instructions    Diet:  Regular      Activity:  Bedrest tonight      Other Special Instructions: return to birthplace tomorrow at 7:30am for induction    Reason for  being seen in L&D: high BP    Treatment/procedures performed in L&D: monitoring, BP checks, biophysical    Call the Birthplace at [phone] if you have:  ? 5 or more contractions in one hour  ? Leaking of fluid from your vaginal area  ? Decreased fetal movement (follow kick count instructions)  ? Bleeding from your vaginal area  ? Swelling in your face, or increased swelling in your hands or legs  ? Headaches or vision problems such as blurring or seeing spots or starts  ? Nausea or vomiting lasting for more than 24 hours  ? An increase in weight (5lbs/week)  ? Epigastric pain (sometimes confused with heartburn that does not go away or a very bad stomach ache)    If you have any questions, please follow up with your doctor.        Patient Signature: ______________________________________________________________  By signing the above I acknowledge that a nurse or my care provider has explained the instruction to me and I have had any questions regarding my care explained to me.        Discharge Nurse Signature: _______________________________ 10/5/2018  6:04 PM    Method of discharge: walking    Accompanied by: self  Time of discharge: 1810        Pending Results     Date and Time Order Name Status Description    10/5/2018 1150 TREPONEMA ABS W REFLEX TO RPR AND TITER In process             Admission Information     Date & Time Provider Department Dept. Phone    10/5/2018 Melanie Bartlett MD [facility] [phone]      Your Vitals Were     Blood Pressure Pulse Temperature Last Period          135/82 97 98.6  F (37  C) (Oral) 12/24/2017        MyChart Information     [employer] gives you secure access to your electronic health record. If you see a primary care provider, you can also send messages to your care team and make appointments. If you have questions, please call your primary care clinic.  If you do not have a primary care provider, please call [phone] and they will assist  you.        Care EveryWhere ID     This is your Care EveryWhere ID. This could be used by other organizations to access your [facility] medical records  [id number]        Equal Access to Services     RIA LOVE : Jaleel Estrella, pedrito kelly, cherania rodriguezkerryglenna goveaselvinglenna, clarissa santanaraphaelmikel duarte. So [facility] [phone].    ATENCIÓN: Si habla español, tiene a bright disposición servicios gratuitos de asistencia lingüística. Llame al [phone].    We comply with applicable federal civil rights laws and [place] laws. We do not discriminate on the basis of race, color, national origin, age, disability, sex, sexual orientation, or gender identity.               Review of your medicines      UNREVIEWED medicines. Ask your doctor about these medicines        Dose / Directions    prenatal multivitamin plus iron 27-0.8 MG Tabs per tablet        Dose:  1 tablet   Take 1 tablet by mouth daily   Refills:  0                Protect others around you: Learn how to safely use, store and throw away your medicines at www.disposemymeds.org.             Medication List: This is a list of all your medications and when to take them. Check marks below indicate your daily home schedule. Keep this list as a reference.      Medications           Morning Afternoon Evening Bedtime As Needed    prenatal multivitamin plus iron 27-0.8 MG Tabs per tablet   Take 1 tablet by mouth daily

## 2018-10-06 ENCOUNTER — HOSPITAL ENCOUNTER (INPATIENT)
Facility: CLINIC | Age: 28
LOS: 2 days | Discharge: HOME | End: 2018-10-08
Attending: FAMILY MEDICINE | Admitting: FAMILY MEDICINE
Payer: COMMERCIAL

## 2018-10-06 ENCOUNTER — ANESTHESIA EVENT (OUTPATIENT)
Dept: OBGYN | Facility: CLINIC | Age: 28
End: 2018-10-06
Payer: COMMERCIAL

## 2018-10-06 ENCOUNTER — ANESTHESIA (OUTPATIENT)
Dept: OBGYN | Facility: CLINIC | Age: 28
End: 2018-10-06
Payer: COMMERCIAL

## 2018-10-06 DIAGNOSIS — D62 ANEMIA DUE TO BLOOD LOSS, ACUTE: ICD-10-CM

## 2018-10-06 PROBLEM — O13.9 GESTATIONAL HYPERTENSION: Status: ACTIVE | Noted: 2018-10-06

## 2018-10-06 PROBLEM — O14.90 PREECLAMPSIA: Status: ACTIVE | Noted: 2018-10-06

## 2018-10-06 LAB
ABO + RH BLD: NORMAL
ABO + RH BLD: NORMAL
ALT SERPL W P-5'-P-CCNC: 11 U/L (ref 0–50)
AST SERPL W P-5'-P-CCNC: 13 U/L (ref 0–45)
BASOPHILS # BLD AUTO: 0 10E9/L (ref 0–0.2)
BASOPHILS NFR BLD AUTO: 0.3 %
BLD GP AB SCN SERPL QL: NORMAL
BLOOD BANK CMNT PATIENT-IMP: NORMAL
CREAT SERPL-MCNC: 0.72 MG/DL (ref 0.52–1.04)
DIFFERENTIAL METHOD BLD: ABNORMAL
EOSINOPHIL NFR BLD AUTO: 0.1 %
ERYTHROCYTE [DISTWIDTH] IN BLOOD BY AUTOMATED COUNT: 12.3 % (ref 10–15)
GFR SERPL CREATININE-BSD FRML MDRD: >90 ML/MIN/1.7M2
HCT VFR BLD AUTO: 34.3 % (ref 35–47)
HGB BLD-MCNC: 11.3 G/DL (ref 11.7–15.7)
IMM GRANULOCYTES # BLD: 0 10E9/L (ref 0–0.4)
IMM GRANULOCYTES NFR BLD: 0.4 %
LYMPHOCYTES # BLD AUTO: 1.9 10E9/L (ref 0.8–5.3)
LYMPHOCYTES NFR BLD AUTO: 24.1 %
MCH RBC QN AUTO: 28.3 PG (ref 26.5–33)
MCHC RBC AUTO-ENTMCNC: 32.9 G/DL (ref 31.5–36.5)
MCV RBC AUTO: 86 FL (ref 78–100)
MONOCYTES # BLD AUTO: 0.5 10E9/L (ref 0–1.3)
MONOCYTES NFR BLD AUTO: 6.5 %
NEUTROPHILS # BLD AUTO: 5.3 10E9/L (ref 1.6–8.3)
NEUTROPHILS NFR BLD AUTO: 68.6 %
NRBC # BLD AUTO: 0 10*3/UL
NRBC BLD AUTO-RTO: 0 /100
PLATELET # BLD AUTO: 178 10E9/L (ref 150–450)
RBC # BLD AUTO: 3.99 10E12/L (ref 3.8–5.2)
SPECIMEN EXP DATE BLD: NORMAL
T PALLIDUM AB SER QL: NONREACTIVE
WBC # BLD AUTO: 7.7 10E9/L (ref 4–11)

## 2018-10-06 PROCEDURE — 25000128 H RX IP 250 OP 636: Performed by: NURSE ANESTHETIST, CERTIFIED REGISTERED

## 2018-10-06 PROCEDURE — 25000132 ZZH RX MED GY IP 250 OP 250 PS 637: Performed by: FAMILY MEDICINE

## 2018-10-06 PROCEDURE — 84450 TRANSFERASE (AST) (SGOT): CPT | Performed by: FAMILY MEDICINE

## 2018-10-06 PROCEDURE — 86780 TREPONEMA PALLIDUM: CPT | Performed by: FAMILY MEDICINE

## 2018-10-06 PROCEDURE — 37000011 ZZH ANESTHESIA WARD SERVICE: Performed by: NURSE ANESTHETIST, CERTIFIED REGISTERED

## 2018-10-06 PROCEDURE — 3E0R3BZ INTRODUCTION OF ANESTHETIC AGENT INTO SPINAL CANAL, PERCUTANEOUS APPROACH: ICD-10-PCS | Performed by: NURSE ANESTHETIST, CERTIFIED REGISTERED

## 2018-10-06 PROCEDURE — 59400 OBSTETRICAL CARE: CPT | Performed by: FAMILY MEDICINE

## 2018-10-06 PROCEDURE — 10907ZC DRAINAGE OF AMNIOTIC FLUID, THERAPEUTIC FROM PRODUCTS OF CONCEPTION, VIA NATURAL OR ARTIFICIAL OPENING: ICD-10-PCS | Performed by: FAMILY MEDICINE

## 2018-10-06 PROCEDURE — 36415 COLL VENOUS BLD VENIPUNCTURE: CPT | Performed by: FAMILY MEDICINE

## 2018-10-06 PROCEDURE — 25000125 ZZHC RX 250: Performed by: NURSE ANESTHETIST, CERTIFIED REGISTERED

## 2018-10-06 PROCEDURE — 82565 ASSAY OF CREATININE: CPT | Performed by: FAMILY MEDICINE

## 2018-10-06 PROCEDURE — 86901 BLOOD TYPING SEROLOGIC RH(D): CPT | Performed by: FAMILY MEDICINE

## 2018-10-06 PROCEDURE — 84460 ALANINE AMINO (ALT) (SGPT): CPT | Performed by: FAMILY MEDICINE

## 2018-10-06 PROCEDURE — 85025 COMPLETE CBC W/AUTO DIFF WBC: CPT | Performed by: FAMILY MEDICINE

## 2018-10-06 PROCEDURE — 25000128 H RX IP 250 OP 636: Performed by: FAMILY MEDICINE

## 2018-10-06 PROCEDURE — 0KQM0ZZ REPAIR PERINEUM MUSCLE, OPEN APPROACH: ICD-10-PCS | Performed by: FAMILY MEDICINE

## 2018-10-06 PROCEDURE — 86850 RBC ANTIBODY SCREEN: CPT | Performed by: FAMILY MEDICINE

## 2018-10-06 PROCEDURE — 40000671 ZZH STATISTIC ANESTHESIA CASE

## 2018-10-06 PROCEDURE — 00HU33Z INSERTION OF INFUSION DEVICE INTO SPINAL CANAL, PERCUTANEOUS APPROACH: ICD-10-PCS | Performed by: NURSE ANESTHETIST, CERTIFIED REGISTERED

## 2018-10-06 PROCEDURE — 72200001 ZZH LABOR CARE VAGINAL DELIVERY SINGLE

## 2018-10-06 PROCEDURE — 86900 BLOOD TYPING SEROLOGIC ABO: CPT | Performed by: FAMILY MEDICINE

## 2018-10-06 PROCEDURE — 12000031 ZZH R&B OB CRITICAL

## 2018-10-06 PROCEDURE — 25000125 ZZHC RX 250: Performed by: FAMILY MEDICINE

## 2018-10-06 RX ORDER — OXYTOCIN/0.9 % SODIUM CHLORIDE 30/500 ML
100 PLASTIC BAG, INJECTION (ML) INTRAVENOUS CONTINUOUS
Status: DISCONTINUED | OUTPATIENT
Start: 2018-10-06 | End: 2018-10-08 | Stop reason: HOSPADM

## 2018-10-06 RX ORDER — NALOXONE HYDROCHLORIDE 0.4 MG/ML
.1-.4 INJECTION, SOLUTION INTRAMUSCULAR; INTRAVENOUS; SUBCUTANEOUS
Status: DISCONTINUED | OUTPATIENT
Start: 2018-10-06 | End: 2018-10-06

## 2018-10-06 RX ORDER — MAGNESIUM SULFATE HEPTAHYDRATE 40 MG/ML
4 INJECTION, SOLUTION INTRAVENOUS
Status: DISCONTINUED | OUTPATIENT
Start: 2018-10-06 | End: 2018-10-06

## 2018-10-06 RX ORDER — MAGNESIUM SULFATE HEPTAHYDRATE 500 MG/ML
4 INJECTION, SOLUTION INTRAMUSCULAR; INTRAVENOUS
Status: DISCONTINUED | OUTPATIENT
Start: 2018-10-06 | End: 2018-10-06

## 2018-10-06 RX ORDER — OXYTOCIN 10 [USP'U]/ML
10 INJECTION, SOLUTION INTRAMUSCULAR; INTRAVENOUS
Status: DISCONTINUED | OUTPATIENT
Start: 2018-10-06 | End: 2018-10-08 | Stop reason: HOSPADM

## 2018-10-06 RX ORDER — FENTANYL/BUPIVACAINE/NS/PF 2-1250MCG
PLASTIC BAG, INJECTION (ML) INJECTION
Status: COMPLETED
Start: 2018-10-06 | End: 2018-10-06

## 2018-10-06 RX ORDER — PRENATAL VIT/IRON FUM/FOLIC AC 27MG-0.8MG
1 TABLET ORAL DAILY
Status: DISCONTINUED | OUTPATIENT
Start: 2018-10-06 | End: 2018-10-08 | Stop reason: HOSPADM

## 2018-10-06 RX ORDER — LIDOCAINE 40 MG/G
CREAM TOPICAL
Status: DISCONTINUED | OUTPATIENT
Start: 2018-10-06 | End: 2018-10-06

## 2018-10-06 RX ORDER — ONDANSETRON 4 MG/1
4 TABLET, ORALLY DISINTEGRATING ORAL EVERY 6 HOURS PRN
Status: DISCONTINUED | OUTPATIENT
Start: 2018-10-06 | End: 2018-10-06

## 2018-10-06 RX ORDER — ACETAMINOPHEN 325 MG/1
650 TABLET ORAL EVERY 4 HOURS PRN
Status: DISCONTINUED | OUTPATIENT
Start: 2018-10-06 | End: 2018-10-08 | Stop reason: HOSPADM

## 2018-10-06 RX ORDER — OXYTOCIN/0.9 % SODIUM CHLORIDE 30/500 ML
1-24 PLASTIC BAG, INJECTION (ML) INTRAVENOUS CONTINUOUS
Status: DISCONTINUED | OUTPATIENT
Start: 2018-10-06 | End: 2018-10-06

## 2018-10-06 RX ORDER — METHYLERGONOVINE MALEATE 0.2 MG/ML
200 INJECTION INTRAVENOUS
Status: DISCONTINUED | OUTPATIENT
Start: 2018-10-06 | End: 2018-10-06

## 2018-10-06 RX ORDER — LANOLIN 100 %
OINTMENT (GRAM) TOPICAL
Status: DISCONTINUED | OUTPATIENT
Start: 2018-10-06 | End: 2018-10-08 | Stop reason: HOSPADM

## 2018-10-06 RX ORDER — ONDANSETRON 2 MG/ML
4 INJECTION INTRAMUSCULAR; INTRAVENOUS EVERY 6 HOURS PRN
Status: DISCONTINUED | OUTPATIENT
Start: 2018-10-06 | End: 2018-10-06

## 2018-10-06 RX ORDER — DIBUCAINE 0.28 G/28G
OINTMENT TOPICAL ONCE
Status: COMPLETED | OUTPATIENT
Start: 2018-10-06 | End: 2018-10-06

## 2018-10-06 RX ORDER — LIDOCAINE HYDROCHLORIDE 10 MG/ML
INJECTION, SOLUTION EPIDURAL; INFILTRATION; INTRACAUDAL; PERINEURAL
Status: DISCONTINUED
Start: 2018-10-06 | End: 2018-10-06 | Stop reason: HOSPADM

## 2018-10-06 RX ORDER — HYDRALAZINE HYDROCHLORIDE 20 MG/ML
5 INJECTION INTRAMUSCULAR; INTRAVENOUS
Status: DISCONTINUED | OUTPATIENT
Start: 2018-10-06 | End: 2018-10-06

## 2018-10-06 RX ORDER — NALBUPHINE HYDROCHLORIDE 10 MG/ML
2.5-5 INJECTION, SOLUTION INTRAMUSCULAR; INTRAVENOUS; SUBCUTANEOUS EVERY 6 HOURS PRN
Status: DISCONTINUED | OUTPATIENT
Start: 2018-10-06 | End: 2018-10-06

## 2018-10-06 RX ORDER — BISACODYL 10 MG
10 SUPPOSITORY, RECTAL RECTAL DAILY PRN
Status: DISCONTINUED | OUTPATIENT
Start: 2018-10-08 | End: 2018-10-08 | Stop reason: HOSPADM

## 2018-10-06 RX ORDER — SODIUM CHLORIDE, SODIUM LACTATE, POTASSIUM CHLORIDE, CALCIUM CHLORIDE 600; 310; 30; 20 MG/100ML; MG/100ML; MG/100ML; MG/100ML
INJECTION, SOLUTION INTRAVENOUS CONTINUOUS
Status: DISCONTINUED | OUTPATIENT
Start: 2018-10-06 | End: 2018-10-06

## 2018-10-06 RX ORDER — FENTANYL CITRATE 50 UG/ML
50-100 INJECTION, SOLUTION INTRAMUSCULAR; INTRAVENOUS
Status: DISCONTINUED | OUTPATIENT
Start: 2018-10-06 | End: 2018-10-06

## 2018-10-06 RX ORDER — AMOXICILLIN 250 MG
1 CAPSULE ORAL 2 TIMES DAILY
Status: DISCONTINUED | OUTPATIENT
Start: 2018-10-06 | End: 2018-10-08 | Stop reason: HOSPADM

## 2018-10-06 RX ORDER — OXYTOCIN/0.9 % SODIUM CHLORIDE 30/500 ML
100-340 PLASTIC BAG, INJECTION (ML) INTRAVENOUS CONTINUOUS PRN
Status: DISCONTINUED | OUTPATIENT
Start: 2018-10-06 | End: 2018-10-06

## 2018-10-06 RX ORDER — LIDOCAINE HYDROCHLORIDE AND EPINEPHRINE 15; 5 MG/ML; UG/ML
INJECTION, SOLUTION EPIDURAL PRN
Status: DISCONTINUED | OUTPATIENT
Start: 2018-10-06 | End: 2018-10-07

## 2018-10-06 RX ORDER — IBUPROFEN 800 MG/1
800 TABLET, FILM COATED ORAL
Status: DISCONTINUED | OUTPATIENT
Start: 2018-10-06 | End: 2018-10-06

## 2018-10-06 RX ORDER — OXYTOCIN 10 [USP'U]/ML
10 INJECTION, SOLUTION INTRAMUSCULAR; INTRAVENOUS
Status: DISCONTINUED | OUTPATIENT
Start: 2018-10-06 | End: 2018-10-06

## 2018-10-06 RX ORDER — BUPIVACAINE HYDROCHLORIDE 2.5 MG/ML
INJECTION, SOLUTION INFILTRATION; PERINEURAL PRN
Status: DISCONTINUED | OUTPATIENT
Start: 2018-10-06 | End: 2018-10-07

## 2018-10-06 RX ORDER — OXYCODONE AND ACETAMINOPHEN 5; 325 MG/1; MG/1
1 TABLET ORAL
Status: DISCONTINUED | OUTPATIENT
Start: 2018-10-06 | End: 2018-10-06

## 2018-10-06 RX ORDER — OXYCODONE HYDROCHLORIDE 5 MG/1
5 TABLET ORAL EVERY 4 HOURS PRN
Status: DISCONTINUED | OUTPATIENT
Start: 2018-10-06 | End: 2018-10-08 | Stop reason: HOSPADM

## 2018-10-06 RX ORDER — MISOPROSTOL 200 UG/1
800 TABLET ORAL
Status: DISCONTINUED | OUTPATIENT
Start: 2018-10-06 | End: 2018-10-08 | Stop reason: HOSPADM

## 2018-10-06 RX ORDER — NALOXONE HYDROCHLORIDE 0.4 MG/ML
.1-.4 INJECTION, SOLUTION INTRAMUSCULAR; INTRAVENOUS; SUBCUTANEOUS
Status: DISCONTINUED | OUTPATIENT
Start: 2018-10-06 | End: 2018-10-08 | Stop reason: HOSPADM

## 2018-10-06 RX ORDER — IBUPROFEN 800 MG/1
800 TABLET, FILM COATED ORAL EVERY 6 HOURS PRN
Status: DISCONTINUED | OUTPATIENT
Start: 2018-10-06 | End: 2018-10-08 | Stop reason: HOSPADM

## 2018-10-06 RX ORDER — LORAZEPAM 2 MG/ML
2 INJECTION INTRAMUSCULAR
Status: DISCONTINUED | OUTPATIENT
Start: 2018-10-06 | End: 2018-10-06

## 2018-10-06 RX ORDER — NIFEDIPINE 10 MG/1
10 CAPSULE ORAL
Status: DISCONTINUED | OUTPATIENT
Start: 2018-10-06 | End: 2018-10-06

## 2018-10-06 RX ORDER — ACETAMINOPHEN 325 MG/1
650 TABLET ORAL EVERY 4 HOURS PRN
Status: DISCONTINUED | OUTPATIENT
Start: 2018-10-06 | End: 2018-10-06

## 2018-10-06 RX ORDER — OXYTOCIN/0.9 % SODIUM CHLORIDE 30/500 ML
340 PLASTIC BAG, INJECTION (ML) INTRAVENOUS CONTINUOUS PRN
Status: DISCONTINUED | OUTPATIENT
Start: 2018-10-06 | End: 2018-10-08 | Stop reason: HOSPADM

## 2018-10-06 RX ORDER — CARBOPROST TROMETHAMINE 250 UG/ML
250 INJECTION, SOLUTION INTRAMUSCULAR
Status: DISCONTINUED | OUTPATIENT
Start: 2018-10-06 | End: 2018-10-06

## 2018-10-06 RX ORDER — MAGNESIUM SULFATE HEPTAHYDRATE 40 MG/ML
2 INJECTION, SOLUTION INTRAVENOUS
Status: DISCONTINUED | OUTPATIENT
Start: 2018-10-06 | End: 2018-10-06

## 2018-10-06 RX ORDER — EPHEDRINE SULFATE 50 MG/ML
5 INJECTION, SOLUTION INTRAMUSCULAR; INTRAVENOUS; SUBCUTANEOUS
Status: DISCONTINUED | OUTPATIENT
Start: 2018-10-06 | End: 2018-10-06

## 2018-10-06 RX ORDER — AMOXICILLIN 250 MG
2 CAPSULE ORAL 2 TIMES DAILY
Status: DISCONTINUED | OUTPATIENT
Start: 2018-10-06 | End: 2018-10-08 | Stop reason: HOSPADM

## 2018-10-06 RX ORDER — HYDRALAZINE HYDROCHLORIDE 20 MG/ML
10 INJECTION INTRAMUSCULAR; INTRAVENOUS
Status: DISCONTINUED | OUTPATIENT
Start: 2018-10-06 | End: 2018-10-06

## 2018-10-06 RX ADMIN — BUPIVACAINE HYDROCHLORIDE 5 ML: 2.5 INJECTION, SOLUTION EPIDURAL; INFILTRATION; INTRACAUDAL; PERINEURAL at 11:32

## 2018-10-06 RX ADMIN — BUPIVACAINE HYDROCHLORIDE: 5 INJECTION, SOLUTION EPIDURAL; INTRACAUDAL; PERINEURAL at 11:36

## 2018-10-06 RX ADMIN — IBUPROFEN 800 MG: 800 TABLET ORAL at 20:16

## 2018-10-06 RX ADMIN — BUPIVACAINE HYDROCHLORIDE 10 ML/HR: 5 INJECTION, SOLUTION EPIDURAL; INTRACAUDAL; PERINEURAL at 11:37

## 2018-10-06 RX ADMIN — LIDOCAINE HYDROCHLORIDE 0.2 ML: 10 INJECTION, SOLUTION EPIDURAL; INFILTRATION; INTRACAUDAL; PERINEURAL at 08:07

## 2018-10-06 RX ADMIN — SENNOSIDES AND DOCUSATE SODIUM 1 TABLET: 8.6; 5 TABLET ORAL at 20:16

## 2018-10-06 RX ADMIN — DIBUCAINE: 1 OINTMENT TOPICAL at 16:37

## 2018-10-06 RX ADMIN — BUPIVACAINE HYDROCHLORIDE 5 ML: 2.5 INJECTION, SOLUTION EPIDURAL; INFILTRATION; INTRACAUDAL; PERINEURAL at 11:33

## 2018-10-06 RX ADMIN — OXYTOCIN-SODIUM CHLORIDE 0.9% IV SOLN 30 UNIT/500ML 1 MILLI-UNITS/MIN: 30-0.9/5 SOLUTION at 08:22

## 2018-10-06 RX ADMIN — LIDOCAINE HYDROCHLORIDE,EPINEPHRINE BITARTRATE 3 ML: 15; .005 INJECTION, SOLUTION EPIDURAL; INFILTRATION; INTRACAUDAL; PERINEURAL at 11:28

## 2018-10-06 RX ADMIN — SODIUM CHLORIDE, POTASSIUM CHLORIDE, SODIUM LACTATE AND CALCIUM CHLORIDE: 600; 310; 30; 20 INJECTION, SOLUTION INTRAVENOUS at 08:21

## 2018-10-06 RX ADMIN — SODIUM CHLORIDE, POTASSIUM CHLORIDE, SODIUM LACTATE AND CALCIUM CHLORIDE: 600; 310; 30; 20 INJECTION, SOLUTION INTRAVENOUS at 11:57

## 2018-10-06 NOTE — PROGRESS NOTES
Patient up to the bathroom to void using cary hernandez voided large amt. To room 354 at 1645. Breast feeding independently. No request for pain medication at present.

## 2018-10-06 NOTE — PROGRESS NOTES
Doing well after epidural. Comfortable without pain.   Vitals:    10/06/18 1150 10/06/18 1155 10/06/18 1200 10/06/18 1205   BP: 129/78 128/72 122/71 124/71   Pulse:       Resp: 16 16 16 16   Temp:   98.4  F (36.9  C)    TempSrc:       SpO2: 97% 98% 99% 98%      Vitals noted.  Patient alert, oriented, and in no acute distress.   SVE 5/90/-3.    Ctx q 2-3 minutes.    with variability. No decels.   AROM with small amount of clear fluid.   Small amount of blood tinge fluid after initial gush.   Will continue to monitor for labor progress, continue pitocin as needed, might try to taper since AROM.   Melanie Bartlett MD

## 2018-10-06 NOTE — ANESTHESIA PROCEDURE NOTES
Peripheral nerve/Neuraxial procedure note : epidural catheter  Pre-Procedure  Performed by  ULISES LEDESMA   Location: OB      Pre-Anesthestic Checklist: patient identified, IV checked, risks and benefits discussed, informed consent, monitors and equipment checked, pre-op evaluation and at physician/surgeon's request    Timeout  Correct Patient: Yes   Correct Procedure: Yes   Correct Site: Yes   Correct Laterality: N/A   Correct Position: Yes   Site Marked: N/A   .   Procedure Documentation    .    Procedure:    Epidural catheter.  Insertion Site:L3-4  (midline approach) Injection technique: LORT air   Local skin infiltrated with 3 mL of 1% lidocaine.       Patient Prep;mask, sterile gloves, povidone-iodine 7.5% surgical scrub, patient draped.  .  Needle: Amy needleAlem Needle Gauge: 18.    Needle Length (Inches) 3.5  # of attempts: 2 and  # of redirects:  2 .   Catheter: 20 G . .  Catheter threaded easily  4 cm epidural space.  .   .    Assessment/Narrative  Paresthesias: Yes (left side, disappeared immediately with needle and catheter redirection ).  .  .  Aspiration negative for heme or CSF  . Test dose of 3 mL lidocaine 1.5% w/ 1:200,000 epinephrine at 11:28.  Test dose negative for signs of intravascular, subdural or intrathecal injection. Sensory Level Left: T7  Sensory Level Right: T7  Comments:  Pt. Sitting at bedside. Risks, benefits and alternatives of epidural analgesia discussed with pt.  Her questions were answered, she consents/wishes to proceed as planned.  Back exam landmarks identified, skin prep with betadine.  Lidocaine infiltration at L3 - L4. ES identified via RAMOS (Air) after 2 attempt (s) and 2 redirect (s).  First attempt to bone, off to left side, redirected to right with good RAMOS but unable to thread catheter and pt had paresthesia on left side of spine which disappeared immediatly when needle and catheter removed together and redirected with good catheter placement.  EC passed 4  centimeters without paresthesias or resistance noted.  When withdrawing needle and advancing catheter pt c/o muscle pain in neck. Catheter taped in and pt laid supine in bed and neck pain gone.  Negative heme / CSF aspirated.  EC securred with tegaderm/medipore tape. Patient appeared to tolerate procedure well.  12:04- Pt very comfortable, VSS, I will be available if needed

## 2018-10-06 NOTE — H&P
"  2018    Neo Parra  [id number]        OB Admit History & Physical      Ms. Parra  is here for induction of labor.    She had elevated blood pressure in the clinic yesterday and was brought to the [facility] for outpatient monitoring. Her BP was 164/102 in the clinic and improved to the 130s/140s over 80s at rest.  Her preeclampsia labs were all normal, but because she had had some vision changes worrisome for preeclampsia, and her cervix is favorable, she is being induced.     Patient's last menstrual period was 2017.   Her Estimated Date of Delivery: Oct 12, 2018, making her 39w1d  wks.      Estimated body mass index is 32.38 kg/(m^2) as calculated from the following:    Height as of 18: 1.664 m (5' 5.5\").    Weight as of 10/5/18: 89.6 kg (197 lb 9.6 oz).  Her prenatal course has been complicated by late onset hypertension.    See prenatal for labs.  negative GBBS, Rubella Immune, RH positive    Estimated fetal weight= 7 lbs 14 oz       She is a 27 year old   Her OB history:   Obstetric History       T1      L1     SAB0   TAB0   Ectopic0   Multiple0   Live Births1       # Outcome Date GA Lbr Miguel Ángel/2nd Weight Sex Delivery Anes PTL Lv   2 Current            1 Term 16 39w4d 05:25 / 02:11 3.779 kg (8 lb 5.3 oz) F Vag-Spont EPI N MO      Name: Erika      Apgar1:  8                Apgar5: 9      Obstetric Comments   EDC 10/12/18 based on early us.   to Tushar.            Past Medical History:   Diagnosis Date     NO ACTIVE PROBLEMS           Past Surgical History:   Procedure Laterality Date     HC CREATE EARDRUM OPENING,GEN ANESTH      P.E. Tubes x 2         No current outpatient prescriptions on file.       Allergies: Cephalosporins and Tdap [daptacel]      REVIEW OF SYSTEMS:  NEUROLOGIC:  Negative, denies headache today.   EYES:  Negative, denies vision changes today.  2 days ago was seeing stars when getting up.   ENT:  Negative  GI:  Negative  BREAST:  " Negative  :  Negative  GYN:  Negative  OB:  Feeling some contractions, rates 3-4/10. No bleeding. No leaking fluid. Baby active.   CV:  Negative  PULMONARY:  Negative  MUSCULOSKELETAL:  Negative  PSYCH:  Negative      Social History     Social History     Marital status:      Spouse name: Tushar     Number of children: 0     Years of education: N/A     Occupational History     Not on file.     Social History Main Topics     Smoking status: Never Smoker     Smokeless tobacco: Never Used     Alcohol use No     Drug use: No     Sexual activity: Yes     Partners: Male     Birth control/ protection: None     Other Topics Concern      Service No     Blood Transfusions No     Caffeine Concern No     Occupational Exposure Yes     Office work/ordering     Hobby Hazards No     Sleep Concern No     Stress Concern No     Weight Concern No     Special Diet No     Back Care No     Exercise Yes     Seat Belt Yes     Social History Narrative    2/2018  Lives in [place] with , Tushar and dtr Erika.   No smokers in the household.  Has an indoor cat.  Advised about toxoplasmosis precautions.  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  No concerns about domestic violence.      Family History   Problem Relation Age of Onset     Hypertension Mother        Vitals:   FHT 150s with good variability. She had a span of flat tracing but now has accels and no decels, with contractions every 2-3 min    Alert Awake in NAD  HEENT grossly normal  Neck: no lymphadenopathy or thryoidomegaly  Lungs clear to ausculation  Heart RRR without murmur.   ABD gravid, nontender on exam, cephalic.   Pelvic:  no fluid noted, no blood noted  Cervix is 4 cm / 70 % effaced at -3 station on last exam yesterday am. Will check her again in the next hour after epidural placed.   EXT:  no edema or calf tenderness  Neuro:  intact    Assessment:  IUP at 39w1d  With elevated blood pressure and vision changes meeting diagnostic criteria for preeclampsia,  despite normal labs.   Favorable cervix.       Plan:  Will continue pitocin induction.  She is about to get her epidural and will do AROM afterwards. Anticipate  today. Discussed potential need for antihypertensives and magnesium,  risk, seizure risk, and possible treatment for all of the above.     They have no questions.     Melanie Bartlett MD  Dept of Family Medicine  2018

## 2018-10-06 NOTE — L&D DELIVERY NOTE
Delivery Summary    Neo Parra MRN# [id number]   Age: 27 year old YOB: 1990     ASSESSMENT & PLAN:   Neo Parra is a 27 year old  female who presents at 39w1d weeks with preeclampsia, for induction of labor.      Her prenatal course was complicated by late onset hypertension and vision changes.  She received her prenatal care here in [place].      Her routine prenatal labs were all normal as follows:   A positive  antibody screen negative  Rubella immune   Trepab negative  HepBsAg negative  HIV negative  She passed her 1 hour GTT.   Her GBS is negative    On arrival at the birthplace her cervical exam is as follows:    /-3  She had pitocin induction of labor.   She started having regular contractions, and received epidural with good relief of her labor pain.   She then had AROM with clear fluid at 12:00.   She labored well and was completely dilated at 1:48 pm.  First stage of labor was 1 hrs 48 min.     She pushed very well with  viable female infant over an intact perineum at 1:58 pm.  The infant was then laid on mother's abdomen, received drying and tactile stimulation.  The cord was delayed clamped x 2 and cut by the FOB who was in attendance.  There was spontaneous cry and APGARS at 1 and 5 minutes were 9 and 9 respectively.   Second stage of labor was 0 hrs and 10 minutes.     The placenta delivered spontaneously, intact with a 3VC at 2:04 pm.  Pitocin was given IV after delivery of the placenta.  Third stage of labor was 6 minutes.     Examination of the vaginal walls and perineum revealed a midline second degree posterior perineal laceration.  Fundal massage proved that the uterine fundus was firm.  EBL was 100 ml.      The perineum was anesthetized with 1%plain lidocaine.    The laceration was repaired in the usual sterile running fashion using 3-0 chromic suture.  Excellent hemostasis was obtained.  Repeat examination of the vaginal walls and perineum revealed no  further lacerations and no ongoing bleeding.  Digital rectal exam was normal.      A:   viable female infant at 39 1/7 weeks gestation with APGARS of 9 and 9.  Baby not weighed yet due to breastfeeding    Preeclampsia, mild. BP controlled.   P:  Mom, dad and baby are doing well after delivery.  Mom plans to breastfeed baby and baby will be rooming in with mom.  I anticipate they will both have a normal course.    Will check BMP and CBC tomorrow and monitor BP closely.      Melanie Bartlett MD             Labor Event Times    Labor onset date:  10/6/18 Onset time:  12:00 PM   Dilation complete date:  10/6/18 Complete time:   1:48 PM   Start pushing date/time:  10/6/2018 1349            Labor Length    1st Stage (hrs):  1 (min):  48   2nd Stage (hrs):  0 (min):  10   3rd Stage (hrs):  0 (min):  6      Labor Events     labor?:  No    steroids:  None   Labor Type:  Induction   Predominate monitoring during 1st stage:  continuous electronic fetal monitoring      Antibiotics received during labor?:  No      Rupture identifier:  Rupture 1   Rupture date/time: 10/6/18 1200   Rupture type:  Artificial Rupture of Membranes   Fluid color:  Clear   Fluid odor:  Normal      Induction:  Oxytocin   Induction date/time:     Cervical ripening date/time:     Indications for induction:  Hypertension, Mild Preeclampsia      1:1 continuous labor support provided by?:  RN Labor partogram used?:  no         Delivery/Placenta Date and Time    Delivery Date:  10/6/18 Delivery Time:   1:58 PM   Placenta Date/Time:  10/6/2018  2:04 PM   Oxytocin given at the time of delivery:  after delivery of placenta      Vaginal Counts    Initial count performed by 2 team members:   Two Team Members   lio rodas kathleen          Needles Suture [place] [employer] Instruments   Initial counts 2 0 5    Added to count 0 1 0    Final counts 2 1 5       Placed during labor Accounted for at the end of labor   No NA   No  "NA   No NA      Final count performed by 2 team members:   Two Team Members   Chanell Tran Kathleen         Final count correct?:  Yes         Apgars    Living status:  Living    1 Minute 5 Minute 10 Minute 15 Minute 20 Minute   Skin color: 1  1       Heart rate: 2  2       Reflex irritability: 2  2       Muscle tone: 2  2       Respiratory effort: 2  2       Total: 9  9          Apgars assigned by:  ANGELA      Cord    Vessels:  3 Vessels Complications:  None   Cord Blood Disposition:  Lab Gases Sent?:  No         [place] Resuscitation    Methods:  None         [place] Measurements    Weight:  8 lb 11 oz Length:  1' 8.75\"   Head circumference:  34.3 cm Chest circumference:  34.3 cm      Skin to Skin and Feeding Plan    Skin to skin initiation date/time: 10/6/18 1400   Skin to skin with:  Mother   Skin to skin end date/time:        Labor Events and Shoulder Dystocia    Fetal Tracing Prior to Delivery:  Category 1   Shoulder dystocia present?:  Neg            Delivery (Maternal) (Provider to Complete) (270188)    Episiotomy:  None   Perineal lacerations:  2nd Repaired?:  Yes   Vaginal laceration?:  No    Cervical laceration?:  No          Mother's Information  Mother: Neo Parra #[id number]    Start of Mother's Information     IO Blood Loss  10/06/18 1200 - 10/06/18 1659    Mom's I/O Activity            End of Mother's Information  Mother: Neo Parra #[id number]            Delivery - Provider to Complete ([id number])    Delivering clinician:  REHANA RAMOS   Attempted Delivery Types (Choose all that apply):  Spontaneous Vaginal Delivery   Delivery Type (Choose the 1 that will go to the Birth History):  Vaginal, Spontaneous Delivery                     Other personnel:   Provider Role   ALFREDO IVY Nursing Student            Placenta    Delayed Cord Clamping:  Done   Date/Time:  10/6/2018  2:04 PM   Removal:  Spontaneous   Disposition:  Hospital disposal      Anesthesia  "   Method:  Epidural   Cervical dilation at placement:  4-7         Presentation and Position    Presentation:  Vertex   Position:  Left Occiput Anterior                    Melanie Bartlett MD

## 2018-10-06 NOTE — ANESTHESIA PREPROCEDURE EVALUATION
Anesthesia Evaluation       history and physical reviewed .      No history of anesthetic complications          ROS/MED HX    ENT/Pulmonary:  - neg pulmonary ROS     Neurologic:  - neg neurologic ROS     Cardiovascular:  - neg cardiovascular ROS       METS/Exercise Tolerance:     Hematologic:         Musculoskeletal:         GI/Hepatic:     (+) GERD       Renal/Genitourinary:         Endo:         Psychiatric:         Infectious Disease:         Malignancy:         Other:                     Physical Exam  Normal systems: cardiovascular, pulmonary and dental    Airway   Mallampati: II  TM distance: > 3 FB  Neck ROM: full  Mouth opening: > 3 cm    Dental     Cardiovascular       Pulmonary           neg OB ROS                 Anesthesia Plan      History & Physical Review      ASA Status:  2 .  OB Epidural Asa: 2            Postoperative Care      Consents                          .

## 2018-10-06 NOTE — PROGRESS NOTES
S: Delivery  B: induced Labor,  @ 05ymd4zkpf gestation, GBS negative  A: Patient delivered Vaginal at 1358 with Dr. Bartlett in attendance. Baby delivered and placed on mother's low abdomen for delayed cord clamping where baby was dried and stimulated. After cord clamped and cut, baby was placed skin to skin on mother's chest within 5 minutes following delivery . Apgars 9/9. Placenta was delivered @ 1404 followed by administration of oxytocin. Bonding initiated with mom and baby. Educated mother on importance of exclusive breast feeding, expected feeding readiness cues and encouraged her to observe for feeding cues. Mother informed that breast feeding assistance would be provided. See flowsheet for VS and PP checks. Labor care plan goals met.  R: Expect routine postpartum care. Anticipate first feeding within the hour.

## 2018-10-06 NOTE — IP AVS SNAPSHOT
[facility]    [address]     [place] [address]    Phone:  [phone]                                       After Visit Summary   10/6/2018    Neo Parra    MRN: [id number]           After Visit Summary Signature Page     I have received my discharge instructions, and my questions have been answered. I have discussed any challenges I see with this plan with the nurse or doctor.    ..........................................................................................................................................  Patient/Patient Representative Signature      ..........................................................................................................................................  Patient Representative Print Name and Relationship to Patient    ..................................................               ................................................  Date                                   Time    ..........................................................................................................................................  Reviewed by Signature/Title    ...................................................              ..............................................  Date                                               Time          22EPIC Rev 08/18

## 2018-10-06 NOTE — PROGRESS NOTES
S:  Admission  B:  Neo is a  @ 39w1d gestation, GBS -, Hep. B -, pregnancy complicated by gestational hypertension. EFW 7 per MD  A:  Patient admitted to room 331 for indunction of labor @0730  R: Doing well.  Epidural placed, patient comfortable until 1245.  3/10 low abd pain.  Sat patient hin chandrakant fowlers and gave PCEA bolus.  Feeling better.  8 cm dilated.  Had a wave of nausea but went away.  Decreased variability so discharge pitocin.

## 2018-10-06 NOTE — IP AVS SNAPSHOT
MRN:[id number]                      After Visit Summary   10/6/2018    Neo Parra    MRN: [id number]           Thank you!     Thank you for choosing [facility] for your care. Our goal is always to provide you with excellent care. Hearing back from our patients is one way we can continue to improve our services. Please take a few minutes to complete the written survey that you may receive in the mail after you visit with us. Thank you!        Patient Information     Date Of Birth          1990        Designated Caregiver       Most Recent Value    Caregiver    Will someone help with your care after discharge? no      About your hospital stay     You were admitted on:  October 6, 2018 You last received care in the:  [facility]    You were discharged on:  October 8, 2018       Who to Call     For medical emergencies, please call 911.  For non-urgent questions about your medical care, please call your primary care provider or clinic, [phone]          Attending Provider     Provider Specialty    Melanie Bartlett MD Family Practice       Primary Care Provider Office Phone # Fax #    Melanie Bartlett [phone]      Your next 10 appointments already scheduled     Oct 19, 2018 11:30 AM CDT   ESTABLISHED PRENATAL with Melanie Bartlett MD   [facility]    [address] [place] [id number] [phone]              Further instructions from your care team       [facility] Discharge Instructions     Discharge disposition:  Discharged to home       Diet:  Regular       Activity No sex for 6 week(s)       Follow-up: Follow up with Dr. Bartlett in 6 weeks       Additional instructions: The birthplace staff is available 24 hours 7 days for questions about you or your baby.  Please don't hesitate to call with any concerns.        Additional Patient  Information:  Enjoy beautiful marek Galvan!    Postpartum Vaginal Delivery Instructions    Activity       Ask family and friends for help when you need it.    Do not place anything in your vagina for 6 weeks.    You are not restricted on other activities, but take it easy for a few weeks to allow your body to recover from delivery.  You are able to do any activities you feel up to that point.    No driving until you have stopped taking your pain medications (usually two weeks after delivery).     Call your health care provider if you have any of these symptoms:       Increased pain, swelling, redness, or fluid around your stiches from an episiotomy or perineal tear.    A fever above 100.4 F (38 C) with or without chills when placing a thermometer under your tongue.    You soak a sanitary pad with blood within 1 hour, or you see blood clots larger than a golf ball.    Bleeding that lasts more than 6 weeks.    Vaginal discharge that smells bad.    Severe pain, cramping or tenderness in your lower belly area.    A need to urinate more frequently (use the toilet more often), more urgently (use the toilet very quickly), or it burns when you urinate.    Nausea and vomiting.    Redness, swelling or pain around a vein in your leg.    Problems breastfeeding or a red or painful area on your breast.    Chest pain and cough or are gasping for air.    Problems coping with sadness, anxiety, or depression.  If you have any concerns about hurting yourself or the baby, call your provider immediately.     You have questions or concerns after you return home.     Keep your hands clean:  Always wash your hands before touching your perineal area and stitches.  This helps reduce your risk of infection.  If your hands aren't dirty, you may use an alcohol hand-rub to clean your hands. Keep your nails clean and short.        Pending Results     No orders found from 10/4/2018 to 10/7/2018.            Statement of Approval     Ordered           10/08/18 0828  I have reviewed and agree with all the recommendations and orders detailed in this document.  EFFECTIVE NOW     Approved and electronically signed by:  Melanie Bartlett MD             Admission Information     Date & Time Provider Department Dept. Phone    10/6/2018 Melanie Bartlett MD [facility] [phone]      Your Vitals Were     Blood Pressure Pulse Temperature Respirations Last Period Pulse Oximetry    121/78 (BP Location: Left arm) 70 97.6  F (36.4  C) (Oral) 18 12/24/2017 98%      MyChart Information     [employer] gives you secure access to your electronic health record. If you see a primary care provider, you can also send messages to your care team and make appointments. If you have questions, please call your primary care clinic.  If you do not have a primary care provider, please call [phone] and they will assist you.        Care EveryWhere ID     This is your Care EveryWhere ID. This could be used by other organizations to access your [place] medical records  [id number]        Equal Access to Services     AMANDO LOVE : Hadii garett Estrella, waradhada luqadaha, qaybta kaalmaglenna goel, clarissa ni . So [facility] [phone].    ATENCIÓN: Si habla español, tiene a bright disposición servicios gratuitos de asistencia lingüística. Llame al [phone].    We comply with applicable federal civil rights laws and [place] laws. We do not discriminate on the basis of race, color, national origin, age, disability, sex, sexual orientation, or gender identity.               Review of your medicines      START taking        Dose / Directions    acetaminophen 325 MG tablet   Commonly known as:  TYLENOL        Dose:  650 mg   Take 2 tablets (650 mg) by mouth every 4 hours as needed for mild pain or fever (greater than or equal to 38? C /100.4? F (oral) or 38.5? C/ 101.4? F (core).)   Refills:  0       dibucaine 1  % Oint ointment   Commonly known as:  NUPERCAINAL        Use on perineum or hemorrhoids up to three times daily with tucks pads as needed   Quantity:  60 g   Refills:  1       ferrous sulfate 325 (65 Fe) MG tablet   Commonly known as:  IRON   Used for:  Anemia due to blood loss, acute        Dose:  325 mg   Take 1 tablet (325 mg) by mouth daily   Quantity:  30 tablet   Refills:  0       ibuprofen 600 MG tablet   Commonly known as:  ADVIL/MOTRIN        Dose:  600 mg   Take 1 tablet (600 mg) by mouth every 6 hours as needed for other (cramping)   Quantity:  90 tablet   Refills:  1       lanolin ointment        Use as needed for dry, cracked or sore nipples. No need to wash off.   Refills:  0       senna-docusate 8.6-50 MG per tablet   Commonly known as:  SENOKOT-S;PERICOLACE        Dose:  1 tablet   Take 1 tablet by mouth 2 times daily as needed for constipation   Quantity:  60 tablet   Refills:  0       TUCKS MEDICATED COOLING 50 % Pads        Use on perineum or hemorrhoids three times daily as needed   Quantity:  40 each   Refills:  1         CONTINUE these medicines which have NOT CHANGED        Dose / Directions    prenatal multivitamin plus iron 27-0.8 MG Tabs per tablet        Dose:  1 tablet   Take 1 tablet by mouth daily   Refills:  0            Where to get your medicines      These medications were sent to [facility] [place] - [address]   [address] , [facility] [address]     Phone:  [phone]     dibucaine 1 % Oint ointment    ferrous sulfate 325 (65 Fe) MG tablet    ibuprofen 600 MG tablet    senna-docusate 8.6-50 MG per tablet    TUCKS MEDICATED COOLING 50 % Pads         Some of these will need a paper prescription and others can be bought over the counter. Ask your nurse if you have questions.     You don't need a prescription for these medications     acetaminophen 325 MG tablet    lanolin ointment                Protect others around you: Learn how to safely use,  store and throw away your medicines at www.disposemymeds.org.             Medication List: This is a list of all your medications and when to take them. Check marks below indicate your daily home schedule. Keep this list as a reference.      Medications           Morning Afternoon Evening Bedtime As Needed    acetaminophen 325 MG tablet   Commonly known as:  TYLENOL   Take 2 tablets (650 mg) by mouth every 4 hours as needed for mild pain or fever (greater than or equal to 38? C /100.4? F (oral) or 38.5? C/ 101.4? F (core).)   Last time this was given:  650 mg on 10/7/2018  5:11 PM                                   dibucaine 1 % Oint ointment   Commonly known as:  NUPERCAINAL   Use on perineum or hemorrhoids up to three times daily with tucks pads as needed   Last time this was given:  10/6/2018  4:37 PM                                   ferrous sulfate 325 (65 Fe) MG tablet   Commonly known as:  IRON   Take 1 tablet (325 mg) by mouth daily   Last time this was given:  325 mg on 10/8/2018  9:56 AM                                   ibuprofen 600 MG tablet   Commonly known as:  ADVIL/MOTRIN   Take 1 tablet (600 mg) by mouth every 6 hours as needed for other (cramping)   Last time this was given:  800 mg on 10/8/2018  9:56 AM                                   lanolin ointment   Use as needed for dry, cracked or sore nipples. No need to wash off.   Last time this was given:  10/7/2018  9:08 AM                                   prenatal multivitamin plus iron 27-0.8 MG Tabs per tablet   Take 1 tablet by mouth daily   Last time this was given:  1 tablet on 10/8/2018  9:57 AM                                   senna-docusate 8.6-50 MG per tablet   Commonly known as:  SENOKOT-S;PERICOLACE   Take 1 tablet by mouth 2 times daily as needed for constipation   Last time this was given:  2 tablets on 10/8/2018  9:56 AM                                   TUCKS MEDICATED COOLING 50 % Pads   Use on perineum or hemorrhoids three times  daily as needed

## 2018-10-07 LAB
ANION GAP SERPL CALCULATED.3IONS-SCNC: 8 MMOL/L (ref 3–14)
BUN SERPL-MCNC: 6 MG/DL (ref 7–30)
CALCIUM SERPL-MCNC: 8.2 MG/DL (ref 8.5–10.1)
CHLORIDE SERPL-SCNC: 112 MMOL/L (ref 94–109)
CO2 SERPL-SCNC: 23 MMOL/L (ref 20–32)
CREAT SERPL-MCNC: 0.69 MG/DL (ref 0.52–1.04)
ERYTHROCYTE [DISTWIDTH] IN BLOOD BY AUTOMATED COUNT: 12.5 % (ref 10–15)
GFR SERPL CREATININE-BSD FRML MDRD: >90 ML/MIN/1.7M2
GLUCOSE SERPL-MCNC: 79 MG/DL (ref 70–99)
HCT VFR BLD AUTO: 30.8 % (ref 35–47)
HGB BLD-MCNC: 10.1 G/DL (ref 11.7–15.7)
MCH RBC QN AUTO: 28.2 PG (ref 26.5–33)
MCHC RBC AUTO-ENTMCNC: 32.8 G/DL (ref 31.5–36.5)
MCV RBC AUTO: 86 FL (ref 78–100)
PLATELET # BLD AUTO: 152 10E9/L (ref 150–450)
POTASSIUM SERPL-SCNC: 3.7 MMOL/L (ref 3.4–5.3)
RBC # BLD AUTO: 3.58 10E12/L (ref 3.8–5.2)
SODIUM SERPL-SCNC: 143 MMOL/L (ref 133–144)
T PALLIDUM AB SER QL: NONREACTIVE
WBC # BLD AUTO: 9.4 10E9/L (ref 4–11)

## 2018-10-07 PROCEDURE — 85027 COMPLETE CBC AUTOMATED: CPT | Performed by: FAMILY MEDICINE

## 2018-10-07 PROCEDURE — 12000027 ZZH R&B OB

## 2018-10-07 PROCEDURE — 36415 COLL VENOUS BLD VENIPUNCTURE: CPT | Performed by: FAMILY MEDICINE

## 2018-10-07 PROCEDURE — 25000132 ZZH RX MED GY IP 250 OP 250 PS 637: Performed by: FAMILY MEDICINE

## 2018-10-07 PROCEDURE — 80048 BASIC METABOLIC PNL TOTAL CA: CPT | Performed by: FAMILY MEDICINE

## 2018-10-07 RX ORDER — FERROUS SULFATE 325(65) MG
325 TABLET ORAL DAILY
Status: DISCONTINUED | OUTPATIENT
Start: 2018-10-07 | End: 2018-10-08 | Stop reason: HOSPADM

## 2018-10-07 RX ADMIN — SENNOSIDES AND DOCUSATE SODIUM 2 TABLET: 8.6; 5 TABLET ORAL at 09:08

## 2018-10-07 RX ADMIN — IBUPROFEN 800 MG: 800 TABLET ORAL at 21:04

## 2018-10-07 RX ADMIN — ACETAMINOPHEN 650 MG: 325 TABLET ORAL at 17:11

## 2018-10-07 RX ADMIN — SENNOSIDES AND DOCUSATE SODIUM 2 TABLET: 8.6; 5 TABLET ORAL at 21:04

## 2018-10-07 RX ADMIN — OXYCODONE HYDROCHLORIDE 5 MG: 5 TABLET ORAL at 04:41

## 2018-10-07 RX ADMIN — IBUPROFEN 800 MG: 800 TABLET ORAL at 09:08

## 2018-10-07 RX ADMIN — Medication: at 09:08

## 2018-10-07 RX ADMIN — PRENATAL VIT W/ FE FUMARATE-FA TAB 27-0.8 MG 1 TABLET: 27-0.8 TAB at 09:08

## 2018-10-07 RX ADMIN — FERROUS SULFATE TAB 325 MG (65 MG ELEMENTAL FE) 325 MG: 325 (65 FE) TAB at 11:58

## 2018-10-07 RX ADMIN — ACETAMINOPHEN 650 MG: 325 TABLET ORAL at 04:40

## 2018-10-07 RX ADMIN — ACETAMINOPHEN 650 MG: 325 TABLET ORAL at 13:01

## 2018-10-07 RX ADMIN — ACETAMINOPHEN 650 MG: 325 TABLET ORAL at 09:08

## 2018-10-07 RX ADMIN — IBUPROFEN 800 MG: 800 TABLET ORAL at 02:51

## 2018-10-07 RX ADMIN — IBUPROFEN 800 MG: 800 TABLET ORAL at 15:03

## 2018-10-07 NOTE — ANESTHESIA POSTPROCEDURE EVALUATION
Patient: Neo Parra    * No procedures listed *    Diagnosis:* No pre-op diagnosis entered *  Diagnosis Additional Information: No value filed.    Anesthesia Type:  No value filed.    Note:  Anesthesia Post Evaluation    Patient location during evaluation: Phase 2 and Bedside  Patient participation: Able to fully participate in evaluation  Level of consciousness: awake  Pain management: adequate  Airway patency: patent  Cardiovascular status: acceptable and hemodynamically stable  Respiratory status: acceptable, room air and nonlabored ventilation  Hydration status: stable  PONV: none     Anesthetic complications: None    Comments: Patient was happy with the anesthesia care received and no anesthesia related complications were noted.  I will follow up with the patient again if it is needed.        Last vitals:  Vitals:    10/06/18 2015 10/06/18 2350 10/07/18 0723   BP: 122/85 127/78 128/69   Pulse: 82 76 83   Resp: 16 18 16   Temp: 99.6  F (37.6  C) 98.2  F (36.8  C) 98.7  F (37.1  C)   SpO2: 97%           Electronically Signed By: KHANG Genao CRNA  October 7, 2018  4:00 PM

## 2018-10-07 NOTE — PLAN OF CARE
Problem: Postpartum (Vaginal Delivery) (Adult,Obstetrics,Pediatric)  Goal: Signs and Symptoms of Listed Potential Problems Will be Absent, Minimized or Managed (Postpartum)  Signs and symptoms of listed potential problems will be absent, minimized or managed by discharge/transition of care (reference Postpartum (Vaginal Delivery) (Adult,Obstetrics,Pediatric) CPG).   Outcome: Improving  S: Shift review   B:Neo is a  who delivered vaginally on 10/6  A: VSS, patient is independent with mobility, pain well controlled with p.o. pain meds.  Handles baby with confidence.  R: Continue with routine PP care

## 2018-10-07 NOTE — PLAN OF CARE
Problem: Patient Care Overview  Goal: Plan of Care/Patient Progress Review  Outcome: Improving  S: Shift review   B:Neo is a  who delivered vaginally 24 hrs ago  A: VSS, patient is independent with mobility, pain well controlled with p.o. pain meds, Ibuprofen and Tylenol. Has not stated need for Oxycodone today. Took a tub bath this morning and is using Dermoplast spray and Dibucaine for sore perineum. Handles baby with confidence. Breastfeeding difficult at times due to fussy, restless . Has appropriate latch when  calms. Discussed proper positioning and feeding frequency with patient. Labs stable this morning but was started on Iron tab daily. B/P WNL.   R: Continue with routine PP care and pain control. Continue to encourage frequent breastfeeding. Plan for discharge to home tomorrow.

## 2018-10-07 NOTE — ANESTHESIA CARE TRANSFER NOTE
Patient: Neo Parra    * No procedures listed *    Diagnosis: * No pre-op diagnosis entered *  Diagnosis Additional Information: No value filed.    Anesthesia Type:   No value filed.     Note:  Airway :Room Air  Patient transferred to:Labor and Delivery  Handoff Report: Identifed the Patient, Identified the Reponsible Provider, Reviewed the pertinent medical history, Discussed the surgical course, Reviewed Intra-OP anesthesia mangement and issues during anesthesia, Set expectations for post-procedure period and Allowed opportunity for questions and acknowledgement of understanding      Vitals: (Last set prior to Anesthesia Care Transfer)              Electronically Signed By: KHANG Genao CRNA  October 7, 2018  3:59 PM

## 2018-10-07 NOTE — PROGRESS NOTES
[facility] Obstetrics Post-Partum Progress Note          Assessment and Plan:    Assessment:   Post-partum day #1  Normal spontaneous vaginal delivery  Lactation  L&D complications: Preeclampsia, mild  Acute blood loss anemia      Doing well.  Blood pressures normalized.   Pain well controlled, bleeding not excessive      Plan:   Breast feeding strategies discussed, will continue to work with lactation support as needed  Pain control measures as needed  Will remove IV today. No longer needed.   Start iron supplementation  Anticipate discharge home tomorrow.            Interval History:   Doing well.  Pain is well-controlled.  No fevers.  No history of foul-smelling vaginal discharge.  Good appetite.  Denies chest pain, shortness of breath, nausea or vomiting.  Vaginal bleeding is similar to a heavy menstrual flow.  Ambulatory.  Breastfeeding well.          Significant Problems:    None          Review of Systems:    as above          Medications:     All medications related to the patient's surgery have been reviewed  Current Facility-Administered Medications   Medication     acetaminophen (TYLENOL) tablet 650 mg     [START ON 10/8/2018] bisacodyl (DULCOLAX) Suppository 10 mg     ferrous sulfate (IRON) tablet 325 mg     ibuprofen (ADVIL/MOTRIN) tablet 800 mg     lactated ringers BOLUS 1,000 mL     lanolin ointment     [START ON 10/8/2018] magnesium hydroxide (MILK OF MAGNESIA) suspension 30 mL     misoprostol (CYTOTEC) tablet 800 mcg     naloxone (NARCAN) injection 0.1-0.4 mg     No MMR Needed - Assessment: Patient does not need MMR vaccine     NO Rho (D) immune globulin (RhoGam) needed - mother Rh POSITIVE     No Tdap Needed - Assessment: Patient does not need Tdap vaccine     oxyCODONE IR (ROXICODONE) tablet 5 mg     oxytocin (PITOCIN) 30 units in 500 mL 0.9% NaCl infusion     oxytocin (PITOCIN) 30 units in 500 mL 0.9% NaCl infusion     oxytocin (PITOCIN) injection 10 Units     prenatal multivitamin plus  iron per tablet 1 tablet     senna-docusate (SENOKOT-S;PERICOLACE) 8.6-50 MG per tablet 1 tablet    Or     senna-docusate (SENOKOT-S;PERICOLACE) 8.6-50 MG per tablet 2 tablet     tranexamic acid (CYKLOKAPRON) 1 g in sodium chloride 0.9 % 50 mL bolus     Facility-Administered Medications Ordered in Other Encounters   Medication     bupivacaine (MARCAINE) 0.25 % injection     lidocaine-EPINEPHrine 1.5 %-[id number] injection             Physical Exam:     All vitals stable  Patient Vitals for the past 24 hrs:   BP Temp Temp src Pulse Resp SpO2   10/07/18 0723 128/69 98.7  F (37.1  C) Oral 83 16 -   10/06/18 2350 127/78 98.2  F (36.8  C) Oral 76 18 -   10/06/18 2015 122/85 99.6  F (37.6  C) Oral 82 16 97 %   10/06/18 2000 126/85 - - - - -   10/06/18 1600 126/70 - - - 18 -   10/06/18 1540 131/73 - - - 16 -   10/06/18 1530 113/75 - - - 16 -   10/06/18 1510 (!) 117/91 - - - 16 -   10/06/18 1455 139/86 - - - 16 -   10/06/18 1440 (!) 133/95 - - - 16 -   10/06/18 1420 148/72 - - - - -   10/06/18 1419 148/72 - - 80 16 -   10/06/18 1405 136/69 - - 95 16 -   10/06/18 1400 152/75 - - - 16 -   10/06/18 1345 - 99.1  F (37.3  C) Oral - 16 98 %   10/06/18 1340 125/80 - - - 16 98 %   10/06/18 1335 - - - - - 98 %   10/06/18 1330 - - - - - 98 %   10/06/18 1325 - - - - - 97 %   10/06/18 1320 - - - - - 98 %   10/06/18 1315 - - - - - 97 %   10/06/18 1310 123/68 - - - 16 97 %   10/06/18 1305 - - - - - 97 %   10/06/18 1300 - - - - - 99 %   10/06/18 1255 - - - - - 99 %   10/06/18 1250 - - - - - 99 %   10/06/18 1245 - - - - - 99 %   10/06/18 1240 - - - - - 99 %   10/06/18 1235 125/74 - - - 16 98 %   10/06/18 1205 124/71 - - - 16 98 %   10/06/18 1200 122/71 98.4  F (36.9  C) - - 16 99 %   10/06/18 1155 128/72 - - - 16 98 %   10/06/18 1150 129/78 - - - 16 97 %   10/06/18 1145 129/72 - - - 16 98 %   10/06/18 1140 129/74 - - - 16 98 %   10/06/18 1135 139/80 - - - 16 98 %   10/06/18 1130 132/75 - - - 16 98 %   10/06/18 1125 126/75 - - - 16 96 %    10/06/18 1100 (!) 135/91 - - - 16 -   10/06/18 1059 (!) 135/91 - - - 16 -     Vitals noted.  Patient alert, oriented, and in no acute distress. CV:  RRR without murmur. Respiratory:  Lungs clear to auscultation bilaterally. Abdomen:  Soft, tender with palpation of the uterine fundus which is firm and below the level of the umbilicus, nondistended with good bowel sounds and no masses or hepatosplenomegaly.             Data:   All laboratory data related to this surgery reviewed  Last Basic Metabolic Panel:  Sodium   Date Value Ref Range Status   10/07/2018 143 133 - 144 mmol/L Final     Potassium   Date Value Ref Range Status   10/07/2018 3.7 3.4 - 5.3 mmol/L Final     Chloride   Date Value Ref Range Status   10/07/2018 112 (H) 94 - 109 mmol/L Final     Carbon Dioxide   Date Value Ref Range Status   10/07/2018 23 20 - 32 mmol/L Final     Anion Gap   Date Value Ref Range Status   10/07/2018 8 3 - 14 mmol/L Final     Glucose   Date Value Ref Range Status   10/07/2018 79 70 - 99 mg/dL Final     Urea Nitrogen   Date Value Ref Range Status   10/07/2018 6 (L) 7 - 30 mg/dL Final     Creatinine   Date Value Ref Range Status   10/07/2018 0.69 0.52 - 1.04 mg/dL Final     GFR Estimate   Date Value Ref Range Status   10/07/2018 >90 >60 mL/min/1.7m2 Final     Comment:     Non  GFR Calc     Calcium   Date Value Ref Range Status   10/07/2018 8.2 (L) 8.5 - 10.1 mg/dL Final     Lab Results   Component Value Date    WBC 9.4 10/07/2018     Lab Results   Component Value Date    RBC 3.58 10/07/2018     Lab Results   Component Value Date    HGB 10.1 10/07/2018     Lab Results   Component Value Date    HCT 30.8 10/07/2018     Lab Results   Component Value Date    MCV 86 10/07/2018     Lab Results   Component Value Date    MCH 28.2 10/07/2018     Lab Results   Component Value Date    MCHC 32.8 10/07/2018     Lab Results   Component Value Date    RDW 12.5 10/07/2018     Lab Results   Component Value Date      10/07/2018       Melanie aBrtlett MD

## 2018-10-07 NOTE — PLAN OF CARE
Problem: Postpartum (Vaginal Delivery) (Adult,Obstetrics,Pediatric)  Goal: Signs and Symptoms of Listed Potential Problems Will be Absent, Minimized or Managed (Postpartum)  Signs and symptoms of listed potential problems will be absent, minimized or managed by discharge/transition of care (reference Postpartum (Vaginal Delivery) (Adult,Obstetrics,Pediatric) CPG).   Outcome: Improving  S: Shift review   B:Neo is a  who delivered vaginally on today  A: VSS, patient is independent with mobility, pain well controlled with p.o. pain meds. Handles baby with confidence.  R: Continue with routine PP care  She  Is using dibucaine and pain meds for he

## 2018-10-08 VITALS
OXYGEN SATURATION: 98 % | TEMPERATURE: 97.6 F | SYSTOLIC BLOOD PRESSURE: 121 MMHG | DIASTOLIC BLOOD PRESSURE: 78 MMHG | HEART RATE: 70 BPM | RESPIRATION RATE: 18 BRPM

## 2018-10-08 PROCEDURE — 25000132 ZZH RX MED GY IP 250 OP 250 PS 637: Performed by: FAMILY MEDICINE

## 2018-10-08 RX ORDER — LANOLIN 100 %
OINTMENT (GRAM) TOPICAL
Start: 2018-10-08 | End: 2018-12-04

## 2018-10-08 RX ORDER — DIBUCAINE 0.28 G/28G
OINTMENT TOPICAL
Qty: 60 G | Refills: 1 | Status: SHIPPED | OUTPATIENT
Start: 2018-10-08 | End: 2018-12-04

## 2018-10-08 RX ORDER — IBUPROFEN 600 MG/1
600 TABLET, FILM COATED ORAL EVERY 6 HOURS PRN
Qty: 90 TABLET | Refills: 1 | Status: SHIPPED | OUTPATIENT
Start: 2018-10-08 | End: 2018-12-04

## 2018-10-08 RX ORDER — AMOXICILLIN 250 MG
1 CAPSULE ORAL 2 TIMES DAILY PRN
Qty: 60 TABLET | Refills: 0 | Status: SHIPPED | OUTPATIENT
Start: 2018-10-08 | End: 2018-12-04

## 2018-10-08 RX ORDER — FERROUS SULFATE 325(65) MG
325 TABLET ORAL DAILY
Qty: 30 TABLET | Refills: 0 | Status: SHIPPED | OUTPATIENT
Start: 2018-10-08 | End: 2018-12-04

## 2018-10-08 RX ORDER — ACETAMINOPHEN 325 MG/1
650 TABLET ORAL EVERY 4 HOURS PRN
COMMUNITY
Start: 2018-10-08 | End: 2018-12-04

## 2018-10-08 RX ADMIN — SENNOSIDES AND DOCUSATE SODIUM 2 TABLET: 8.6; 5 TABLET ORAL at 09:56

## 2018-10-08 RX ADMIN — IBUPROFEN 800 MG: 800 TABLET ORAL at 02:49

## 2018-10-08 RX ADMIN — PRENATAL VIT W/ FE FUMARATE-FA TAB 27-0.8 MG 1 TABLET: 27-0.8 TAB at 09:57

## 2018-10-08 RX ADMIN — IBUPROFEN 800 MG: 800 TABLET ORAL at 09:56

## 2018-10-08 RX ADMIN — FERROUS SULFATE TAB 325 MG (65 MG ELEMENTAL FE) 325 MG: 325 (65 FE) TAB at 09:56

## 2018-10-08 NOTE — PROGRESS NOTES
Patient denies need for teaching,. She needs encouragement and reassurance with breast feeding , infant is impatient and bobbys on the breast.

## 2018-10-08 NOTE — PROGRESS NOTES
S: Discharge  to home    B: Patient had a Vaginal delivery with 2nd degree tear with repair. Baby girl Baby's name Mandy Mcknight, breast: . Support person's name Larryalyson and Tushar.     A: VSS. See f/s. Pain management with IB with relief. Bonding with baby well. Receptive to baby's cues.    R: Discharge instructions reviewed and questions answered.  Belongings gathered and returned to the patient. Agreed to follow up in 6 weeks or sooner with any question or concerns.     Nursing Discharge Checklist:    Pneumovax screened and given, if appropriate: N/A  Influenza vaccine screened and given, if appropriate: YES  Staples removed (): N/A  Breast milk returned: YES  Hydrogel pads sent home:YES  Birth Certificate Done: YES

## 2018-10-08 NOTE — PLAN OF CARE
Problem: Postpartum (Vaginal Delivery) (Adult,Obstetrics,Pediatric)  Goal: Signs and Symptoms of Listed Potential Problems Will be Absent, Minimized or Managed (Postpartum)  Signs and symptoms of listed potential problems will be absent, minimized or managed by discharge/transition of care (reference Postpartum (Vaginal Delivery) (Adult,Obstetrics,Pediatric) CPG).   Outcome: Improving  S: Shift review   B:Neo is a  who delivered vaginally on yesterday  A: VSS, patient is independent with mobility, pain well controlled with p.o. pain meds. Handles baby with confidence.  R: Continue with routine PP care

## 2018-10-08 NOTE — PROGRESS NOTES
Patient is using lanolin and hydrogel pads for confort. There is no bleeding or blisters on nipples.

## 2018-10-08 NOTE — PLAN OF CARE
Problem: Postpartum (Vaginal Delivery) (Adult,Obstetrics,Pediatric)  Goal: Signs and Symptoms of Listed Potential Problems Will be Absent, Minimized or Managed (Postpartum)  Signs and symptoms of listed potential problems will be absent, minimized or managed by discharge/transition of care (reference Postpartum (Vaginal Delivery) (Adult,Obstetrics,Pediatric) CPG).   Outcome: Improving  Ibuprofen given for perineal/hemorrhoid pain and pt reports has been effective. Vss. Pt is independent and confident with infant cares and breastfeeding, using nipple shield. FF @ U/1, light lochia flow. Will continue postpartum plan of care.

## 2018-10-08 NOTE — DISCHARGE SUMMARY
[facility] Discharge Summary    Neo Parra MRN# [id number]   Age: 27 year old YOB: 1990     Date of Admission:  10/6/2018  Date of Discharge::  10/8/2018  Admitting Physician:  Melanie Bartlett MD  Discharge Physician:  Melanie Bartlett MD     Home clinic: [facility]          Admission Diagnoses:   Gestational hypertension  Supervision of other normal pregnancy   Preeclampsia          Discharge Diagnosis:   Normal spontaneous vaginal delivery  Intrauterine pregnancy at 39 1/7 weeks gestation  Preeclampsia, mild  Acute blood loss anemia  Lactation          Procedures:   Procedure(s): No additional procedures performed              Medications Prior to Admission:     Prescriptions Prior to Admission   Medication Sig Dispense Refill Last Dose     Prenatal Vit-Fe Fumarate-FA (PRENATAL MULTIVITAMIN PLUS IRON) 27-0.8 MG TABS per tablet Take 1 tablet by mouth daily   10/4/2018 at 0900             Discharge Medications:     Current Discharge Medication List      START taking these medications    Details   acetaminophen (TYLENOL) 325 MG tablet Take 2 tablets (650 mg) by mouth every 4 hours as needed for mild pain or fever (greater than or equal to 38  C /100.4  F (oral) or 38.5  C/ 101.4  F (core).)    Associated Diagnoses:  (normal spontaneous vaginal delivery)      dibucaine (NUPERCAINAL) 1 % OINT ointment Use on perineum or hemorrhoids up to three times daily with tucks pads as needed  Qty: 60 g, Refills: 1    Associated Diagnoses:  (normal spontaneous vaginal delivery)      ferrous sulfate (IRON) 325 (65 Fe) MG tablet Take 1 tablet (325 mg) by mouth daily  Qty: 30 tablet, Refills: 0    Associated Diagnoses: Anemia due to blood loss, acute      ibuprofen (ADVIL/MOTRIN) 600 MG tablet Take 1 tablet (600 mg) by mouth every 6 hours as needed for other (cramping)  Qty: 90 tablet, Refills: 1    Associated Diagnoses:  (normal spontaneous vaginal  delivery)      lanolin ointment Use as needed for dry, cracked or sore nipples. No need to wash off.    Associated Diagnoses: Postpartum care and examination of lactating mother      senna-docusate (SENOKOT-S;PERICOLACE) 8.6-50 MG per tablet Take 1 tablet by mouth 2 times daily as needed for constipation  Qty: 60 tablet, Refills: 0    Associated Diagnoses:  (normal spontaneous vaginal delivery)      Witch Hazel (TUCKS MEDICATED COOLING) 50 % PADS Use on perineum or hemorrhoids three times daily as needed  Qty: 40 each, Refills: 1    Associated Diagnoses:  (normal spontaneous vaginal delivery)         CONTINUE these medications which have NOT CHANGED    Details   Prenatal Vit-Fe Fumarate-FA (PRENATAL MULTIVITAMIN PLUS IRON) 27-0.8 MG TABS per tablet Take 1 tablet by mouth daily                   Consultations:     No consultations were requested during this admission          Brief History of Labor:     Patient was admitted with elevated blood pressure and vision changes concerning for preeclampsia. She was induced with pitocin and went on to deliver a healthy baby girl without complications.  Please see delivery summary for full details.              Hospital Course:     The patient's hospital course was otherwise unremarkable.  On discharge, her pain was well controlled. Vaginal bleeding is similar to peak menstrual flow.  Her blood pressures remained normal.  Her labs remained normal, see below.  Voiding without difficulty.  Ambulating well and tolerating a normal diet.  No fever.  Breastfeeding well.  Infant is stable.  She was discharged on post-partum day #2.    On the day of discharge her exam is as follows:    Vitals:    10/07/18 0723 10/07/18 1600 10/08/18 0055 10/08/18 0749   BP: 128/69 131/81 131/80 121/78   BP Location:    Left arm   Pulse: 83 94 89 70   Resp:    Temp: 98.7  F (37.1  C) 98.6  F (37  C) 98.2  F (36.8  C) 97.6  F (36.4  C)   TempSrc: Oral Oral Oral Oral   SpO2:   98%        Vitals noted.  Patient alert, oriented, and in no acute distress. CV:  RRR without murmur. Respiratory:  Lungs clear to auscultation bilaterally. Abdomen:  Soft, tender with palpation of the uterine fundus which is firm and below the level of the umbilicus, nondistended with good bowel sounds and no masses or hepatosplenomegaly.  Extremities warm and dry without significant edema.       Post-partum hemoglobin:   Hemoglobin   Date Value Ref Range Status   10/07/2018 10.1 (L) 11.7 - 15.7 g/dL Final             Discharge Instructions and Follow-Up:   Discharge diet: Regular   Discharge activity: No sex for 6 week(s)   Discharge follow-up: Follow up with Dr. Bartlett in 6 weeks   Wound care: Tub soaks daily as able           Discharge Disposition:   Discharged to home      Attestation:  I have reviewed today's vital signs, notes, medications, labs and imaging.    Melanie Bartlett MD

## 2018-10-08 NOTE — DISCHARGE INSTRUCTIONS
[facility] Discharge Instructions     Discharge disposition:  Discharged to home       Diet:  Regular       Activity No sex for 6 week(s)       Follow-up: Follow up with Dr. Bartlett in 6 weeks       Additional instructions: [facility] staff is available 24 hours 7 days for questions about you or your baby.  Please don't hesitate to call with any concerns.        Additional Patient Information:  Enjoy beautiful marek Galvan!    Postpartum Vaginal Delivery Instructions    Activity       Ask family and friends for help when you need it.    Do not place anything in your vagina for 6 weeks.    You are not restricted on other activities, but take it easy for a few weeks to allow your body to recover from delivery.  You are able to do any activities you feel up to that point.    No driving until you have stopped taking your pain medications (usually two weeks after delivery).     Call your health care provider if you have any of these symptoms:       Increased pain, swelling, redness, or fluid around your stiches from an episiotomy or perineal tear.    A fever above 100.4 F (38 C) with or without chills when placing a thermometer under your tongue.    You soak a sanitary pad with blood within 1 hour, or you see blood clots larger than a golf ball.    Bleeding that lasts more than 6 weeks.    Vaginal discharge that smells bad.    Severe pain, cramping or tenderness in your lower belly area.    A need to urinate more frequently (use the toilet more often), more urgently (use the toilet very quickly), or it burns when you urinate.    Nausea and vomiting.    Redness, swelling or pain around a vein in your leg.    Problems breastfeeding or a red or painful area on your breast.    Chest pain and cough or are gasping for air.    Problems coping with sadness, anxiety, or depression.  If you have any concerns about hurting yourself or the baby, call your provider immediately.     You have questions  or concerns after you return home.     Keep your hands clean:  Always wash your hands before touching your perineal area and stitches.  This helps reduce your risk of infection.  If your hands aren't dirty, you may use an alcohol hand-rub to clean your hands. Keep your nails clean and short.

## 2018-10-13 NOTE — PROGRESS NOTES
Neo Parra  Gender: female  : 1990  [address]  [facility] [id number]  [phone] (home) [phone] (work)  Medical Record: [id number]  Primary Care Provider: Melanie Bartlett       [facility]   ?   Discharge Phone Call: Key Words/Key Times     How are you and the baby?     How are feedings going?     Voiding & Stooling?     Any questions or concerns?     Follow-up appointment?       We want to provide excellent care here at [facility]. Do you have any feedback for us that would help us improve?     Call back COMMENTS:         Attempted Calls:   ___10/13 Left Message______     ____  10/15/18 1122 no answer per ASHLEY Samayoa RN______

## 2018-12-04 ENCOUNTER — PRENATAL OFFICE VISIT (OUTPATIENT)
Dept: FAMILY MEDICINE | Facility: CLINIC | Age: 28
End: 2018-12-04
Payer: COMMERCIAL

## 2018-12-04 VITALS
RESPIRATION RATE: 14 BRPM | BODY MASS INDEX: 27.76 KG/M2 | HEART RATE: 92 BPM | WEIGHT: 169.4 LBS | OXYGEN SATURATION: 100 % | SYSTOLIC BLOOD PRESSURE: 124 MMHG | TEMPERATURE: 97.9 F | DIASTOLIC BLOOD PRESSURE: 84 MMHG

## 2018-12-04 DIAGNOSIS — D62 ANEMIA DUE TO BLOOD LOSS, ACUTE: ICD-10-CM

## 2018-12-04 DIAGNOSIS — R03.0 ELEVATED BLOOD PRESSURE READING WITHOUT DIAGNOSIS OF HYPERTENSION: ICD-10-CM

## 2018-12-04 LAB — HGB BLD-MCNC: 13.8 G/DL (ref 11.7–15.7)

## 2018-12-04 PROCEDURE — 36415 COLL VENOUS BLD VENIPUNCTURE: CPT | Performed by: FAMILY MEDICINE

## 2018-12-04 PROCEDURE — 85018 HEMOGLOBIN: CPT | Performed by: FAMILY MEDICINE

## 2018-12-04 PROCEDURE — 99207 ZZC POST PARTUM EXAM: CPT | Performed by: FAMILY MEDICINE

## 2018-12-04 ASSESSMENT — PAIN SCALES - GENERAL: PAINLEVEL: NO PAIN (0)

## 2018-12-04 NOTE — PROGRESS NOTES
Neo is here for a 6-week postpartum checkup.    She had a  of a viable girl, weight 8 pounds 11 oz., with complications of preeclampsia, but blood pressures remained normal after delivery. Date of delivery was 10/6/2018. Since delivery, she has been breast feeding.  She has no signs of infection, bleeding or other complications.  She is not pregnant. she is sexually active without pain.  We discussed contraception options and she has chosen condoms.      Post partum tubal: No  History of Gestational Diabetes? No  Type of Delivery:  Vaginal  Feeding Method:  Pumping  If initiated breast feeding and stopped, how long did you breast feed?:  current    REVIEW OF SYSTEMS:  Ears/Nose/Throat: negative  Respiratory: negative  Cardiovascular: negative  Gastrointestinal: negative  Genitourinary: negative  Musculoskeletal: negative    Neurologic: negative   Skin: negative   Endocrine:  negative  Psych:negative for postpartum depression      Past Medical History:   Diagnosis Date     NO ACTIVE PROBLEMS        Past Surgical History:   Procedure Laterality Date     HC CREATE EARDRUM OPENING,GEN ANESTH  1994    P.E. Tubes x 2       Family History   Problem Relation Age of Onset     Hypertension Mother            EXAM:  /84   Pulse 92   Temp 97.9  F (36.6  C) (Temporal)   Resp 14   Wt 76.8 kg (169 lb 6.4 oz)   LMP 2017   SpO2 100%   Breastfeeding? Yes   BMI 27.76 kg/m    HEENT: grossly normal.  NECK: no lymphadenopathy or thyroidomegaly.  LUNGS: CTA X 2, no rales or crackles.  BACK: No spinal or CVA tenderness.  HEART: RRR without murmurs clicks or gallops.  ABDOMEN: soft, non tender, good bowel sounds, without masses rebound, guarding or tenderness.  INCISION: n/a  PELVIC: deferred.   EXTREMITIES:  warm to touch, good pulses, no ankle edema or calf tenderness.  NEUROLOGIC: grossly normal.    ASSESSMENT:   6-week postpartum exam after .    ICD-10-CM    1. Routine postpartum follow-up Z39.2    2.  Elevated blood pressure reading without diagnosis of hypertension R03.0 order for DME   3. Anemia due to blood loss, acute D62 Hemoglobin        PLAN:    Contraception methods discussed, will notify me if she prefers anything different than condoms.   Discussed calcium intake, vitamins and supplements, continue PNV.  Exercise encouraged for return to prepregnancy weight and control of BP.  I also ordered her a BP cuff for home monitoring for the long term. Goal <130/80.    Follow up in 1 year  Hemoglobin obtained due to anemia.     Melanie Bartlett MD

## 2018-12-04 NOTE — MR AVS SNAPSHOT
After Visit Summary   12/4/2018    Neo Parra    MRN: [id number]           Patient Information     Date Of Birth          1990        Visit Information        Provider Department      12/4/2018 1:30 PM Melanie Bartlett MD [facility]        Today's Diagnoses     Routine postpartum follow-up    -  1    Elevated blood pressure reading without diagnosis of hypertension        Anemia due to blood loss, acute           Follow-ups after your visit        Who to contact     If you have questions or need follow up information about today's clinic visit or your schedule please contact [facility] directly at [phone].  Normal or non-critical lab and imaging results will be communicated to you by [employer], letter or phone within 4 business days after the clinic has received the results. If you do not hear from us within 7 days, please contact the clinic through [employer] Panjur[employer] or phone. If you have a critical or abnormal lab result, we will notify you by phone as soon as possible.  Submit refill requests through [employer] or call your pharmacy and they will forward the refill request to us. Please allow 3 business days for your refill to be completed.          Additional Information About Your Visit        MyChart Information     [employer] gives you secure access to your electronic health record. If you see a primary care provider, you can also send messages to your care team and make appointments. If you have questions, please call your primary care clinic.  If you do not have a primary care provider, please call [phone] and they will assist you.        Care EveryWhere ID     This is your Care EveryWhere ID. This could be used by other organizations to access your [facility] medical records  [id number]        Your Vitals Were     Pulse Temperature Respirations Last Period Pulse Oximetry Breastfeeding?    92 97.9  F (36.6  C) (Temporal) 14 12/24/2017 100% Yes     BMI (Body Mass Index)                   27.76 kg/m2            Blood Pressure from Last 3 Encounters:   12/04/18 124/84   10/08/18 121/78   10/05/18 141/90    Weight from Last 3 Encounters:   12/04/18 169 lb 6.4 oz (76.8 kg)   10/05/18 197 lb 9.6 oz (89.6 kg)   09/28/18 198 lb 9.6 oz (90.1 kg)              We Performed the Following     Hemoglobin          Today's Medication Changes          These changes are accurate as of 12/4/18  2:31 PM.  If you have any questions, ask your nurse or doctor.               Start taking these medicines.        Dose/Directions    order for DME   Used for:  Elevated blood pressure reading without diagnosis of hypertension   Started by:  Melanie Bartlett MD        Equipment being ordered: blood pressure cuff, adult regular   Quantity:  1 Device   Refills:  0            Where to get your medicines      Some of these will need a paper prescription and others can be bought over the counter.  Ask your nurse if you have questions.     Bring a paper prescription for each of these medications     order for DME                Primary Care Provider Office Phone # Fax #    Melanie Bartlett [phone] [address] [place] [address]        Equal Access to Services     RIA LOVE AH: Hadii aad ku hadasho Soomaali, waaxda luqadaha, qaybta kaalmada adeegyada, clarissa duarte. So [facility] [phone].    ATENCIÓN: Si habla español, tiene a bright disposición servicios gratuitos de asistencia lingüística. Llame al [phone].    We comply with applicable federal civil rights laws and [place] laws. We do not discriminate on the basis of race, color, national origin, age, disability, sex, sexual orientation, or gender identity.            Thank you!     Thank you for choosing [facility]  for your care. Our goal is always to provide you with excellent care. Hearing back from our patients is one way we can  continue to improve our services. Please take a few minutes to complete the written survey that you may receive in the mail after your visit with us. Thank you!             Your Updated Medication List - Protect others around you: Learn how to safely use, store and throw away your medicines at www.disposemymeds.org.          This list is accurate as of 12/4/18  2:31 PM.  Always use your most recent med list.                   Brand Name Dispense Instructions for use Diagnosis    order for DME     1 Device    Equipment being ordered: blood pressure cuff, adult regular    Elevated blood pressure reading without diagnosis of hypertension

## 2019-01-03 PROBLEM — Z34.83 ENCOUNTER FOR SUPERVISION OF OTHER NORMAL PREGNANCY IN THIRD TRIMESTER: Status: RESOLVED | Noted: 2018-09-26 | Resolved: 2019-01-03

## 2019-01-03 PROBLEM — O13.9 GESTATIONAL HYPERTENSION: Status: RESOLVED | Noted: 2018-10-06 | Resolved: 2019-01-03

## 2019-01-03 PROBLEM — Z36.89 ENCOUNTER FOR TRIAGE IN PREGNANT PATIENT: Status: RESOLVED | Noted: 2018-10-05 | Resolved: 2019-01-03

## 2019-01-03 PROBLEM — O14.90 PREECLAMPSIA: Status: RESOLVED | Noted: 2018-10-06 | Resolved: 2019-01-03

## 2019-04-25 ENCOUNTER — TELEPHONE (OUTPATIENT)
Dept: FAMILY MEDICINE | Facility: CLINIC | Age: 29
End: 2019-04-25

## 2019-04-25 NOTE — TELEPHONE ENCOUNTER
Reason for Call: Request for an order or referral:    Order or referral being requested: MRI of neck     Date needed: at your convenience    Has the patient been seen by the PCP for this problem? YES    Additional comments: Pt states you discussed this at her daughter's last appt. Please place order.     Phone number Patient can be reached at:  Home number on file [phone] (home)    Best Time:  Any     Can we leave a detailed message on this number?  YES    Call taken on 4/25/2019 at 11:44 AM by Kimber Ambrocio

## 2019-04-26 NOTE — TELEPHONE ENCOUNTER
Calling patient to discuss her symptoms. Left message on cell# for patient to return call to [phone]. Please clarify her symptoms when she returns call.  Eva Smith, CMA

## 2019-04-30 NOTE — TELEPHONE ENCOUNTER
I left her a message to call back to discuss again, need to get more details to put in correct order.   Melanie Bartlett MD

## 2019-04-30 NOTE — TELEPHONE ENCOUNTER
Patient calling back. Patient states she had talked to Dr Tobin 6 months about this.   States pain started after epidural with daughter. Patient states when using her left arm (picking daughter up) it starts hurting. States sometimes it hurts to turn her head or bend down. States it doesn't always hurt and is hard to know what triggers it. States picking up heavy objects always causes a lot of pain. Please call with any further questions.

## 2019-05-02 NOTE — TELEPHONE ENCOUNTER
Message left to return call to clinic to obtain further information in regards to her symptoms. Needing to know the location of her pain, low back, mid back,neck,one side of back or another, any radiating pain, numbness or tingling? Lorrie Gunn LPN

## 2019-05-05 ENCOUNTER — MYC MEDICAL ADVICE (OUTPATIENT)
Dept: FAMILY MEDICINE | Facility: CLINIC | Age: 29
End: 2019-05-05

## 2019-05-06 NOTE — TELEPHONE ENCOUNTER
Patient states pain is in neck, states pain is more on the right side. States pain does radiate down whole back if picks up daughter or something heavy at work. States if sits for a long time neck does become numb.   Ibeth Paredes MA

## 2019-05-07 NOTE — TELEPHONE ENCOUNTER
Patient messaged with understanding if positive for pregnancy she can not complete an MRI at this time. Will message with any change. Lorrie Gunn LPN

## 2019-05-08 ENCOUNTER — ALLIED HEALTH/NURSE VISIT (OUTPATIENT)
Dept: FAMILY MEDICINE | Facility: OTHER | Age: 29
End: 2019-05-08
Payer: COMMERCIAL

## 2019-05-08 VITALS
SYSTOLIC BLOOD PRESSURE: 122 MMHG | DIASTOLIC BLOOD PRESSURE: 80 MMHG | WEIGHT: 173.4 LBS | BODY MASS INDEX: 28.42 KG/M2 | HEART RATE: 76 BPM

## 2019-05-08 DIAGNOSIS — N91.2 ABSENCE OF MENSTRUATION: Primary | ICD-10-CM

## 2019-05-08 LAB — HCG UR QL: POSITIVE

## 2019-05-08 PROCEDURE — 81025 URINE PREGNANCY TEST: CPT | Performed by: FAMILY MEDICINE

## 2019-05-08 PROCEDURE — 99207 ZZC NO CHARGE NURSE ONLY: CPT

## 2019-05-08 RX ORDER — PRENATAL VIT/IRON FUM/FOLIC AC 27MG-0.8MG
1 TABLET ORAL DAILY
COMMUNITY
End: 2021-09-14

## 2019-05-08 NOTE — PROGRESS NOTES
Neo Parra is a 28 year old here today for a pregnancy test.  LMP: Patient's last menstrual period was 2019 (approximate).  Wt: 173 lbs 6.4 oz.    Symptoms include absence of menses and fatigue.    Neo informed of positive pregnancy test results. BEVERLY: 2020    Educational advice given: nutrition, smoking and drugs & alcohol.    Current medications reviewed: Yes    Previous pregnancy history remarkable for: hypertension.    Plan: schedule appointment with OB Educator and/or OB class, follow-up appointment with Dr. Bartlett for pre-marvin care, take multivitamin or pre-marvin vitamins and OB Education packet given.    She is to call back if she has any questions or concerns.  She is advised to notify a provider immediately if she experiences any severe cramping or abdominal pain or any vaginal bleeding.    Allison Corey, BSN, RN  [facility]

## 2019-05-23 ENCOUNTER — PRENATAL OFFICE VISIT (OUTPATIENT)
Dept: FAMILY MEDICINE | Facility: OTHER | Age: 29
End: 2019-05-23
Payer: COMMERCIAL

## 2019-05-23 DIAGNOSIS — Z34.90 SUPERVISION OF NORMAL PREGNANCY: Primary | ICD-10-CM

## 2019-05-23 PROCEDURE — 99207 ZZC NO CHARGE NURSE ONLY: CPT

## 2019-05-30 ENCOUNTER — HOSPITAL ENCOUNTER (OUTPATIENT)
Dept: ULTRASOUND IMAGING | Facility: CLINIC | Age: 29
Discharge: HOME | End: 2019-05-30
Attending: FAMILY MEDICINE | Admitting: FAMILY MEDICINE
Payer: COMMERCIAL

## 2019-05-30 DIAGNOSIS — Z34.90 SUPERVISION OF NORMAL PREGNANCY: ICD-10-CM

## 2019-05-30 LAB
ABO + RH BLD: NORMAL
ABO + RH BLD: NORMAL
BLD GP AB SCN SERPL QL: NORMAL
BLOOD BANK CMNT PATIENT-IMP: NORMAL
ERYTHROCYTE [DISTWIDTH] IN BLOOD BY AUTOMATED COUNT: 13 % (ref 10–15)
HCT VFR BLD AUTO: 41.3 % (ref 35–47)
HGB BLD-MCNC: 13.6 G/DL (ref 11.7–15.7)
MCH RBC QN AUTO: 29.5 PG (ref 26.5–33)
MCHC RBC AUTO-ENTMCNC: 32.9 G/DL (ref 31.5–36.5)
MCV RBC AUTO: 90 FL (ref 78–100)
PLATELET # BLD AUTO: 219 10E9/L (ref 150–450)
RBC # BLD AUTO: 4.61 10E12/L (ref 3.8–5.2)
RUBV IGG SERPL IA-ACNC: 14 IU/ML
SPECIMEN EXP DATE BLD: NORMAL
T PALLIDUM AB SER QL: NONREACTIVE
WBC # BLD AUTO: 7.3 10E9/L (ref 4–11)

## 2019-05-30 PROCEDURE — 87389 HIV-1 AG W/HIV-1&-2 AB AG IA: CPT | Performed by: FAMILY MEDICINE

## 2019-05-30 PROCEDURE — 76801 OB US < 14 WKS SINGLE FETUS: CPT

## 2019-05-30 PROCEDURE — 86762 RUBELLA ANTIBODY: CPT | Performed by: FAMILY MEDICINE

## 2019-05-30 PROCEDURE — 86901 BLOOD TYPING SEROLOGIC RH(D): CPT | Performed by: FAMILY MEDICINE

## 2019-05-30 PROCEDURE — 87340 HEPATITIS B SURFACE AG IA: CPT | Performed by: FAMILY MEDICINE

## 2019-05-30 PROCEDURE — 36415 COLL VENOUS BLD VENIPUNCTURE: CPT | Performed by: FAMILY MEDICINE

## 2019-05-30 PROCEDURE — 86850 RBC ANTIBODY SCREEN: CPT | Performed by: FAMILY MEDICINE

## 2019-05-30 PROCEDURE — 86900 BLOOD TYPING SEROLOGIC ABO: CPT | Performed by: FAMILY MEDICINE

## 2019-05-30 PROCEDURE — 85027 COMPLETE CBC AUTOMATED: CPT | Performed by: FAMILY MEDICINE

## 2019-05-30 PROCEDURE — 86780 TREPONEMA PALLIDUM: CPT | Performed by: FAMILY MEDICINE

## 2019-05-30 PROCEDURE — 87086 URINE CULTURE/COLONY COUNT: CPT | Performed by: FAMILY MEDICINE

## 2019-05-30 NOTE — RESULT ENCOUNTER NOTE
Neo, congratulations!    Here are the first of your lab results. So far they look normal, we'll review all results in detail at your first appointment.   Melanie Bartlett MD

## 2019-05-31 LAB
BACTERIA SPEC CULT: NORMAL
HBV SURFACE AG SERPL QL IA: NONREACTIVE
HIV 1+2 AB+HIV1 P24 AG SERPL QL IA: NONREACTIVE
Lab: NORMAL
SPECIMEN SOURCE: NORMAL

## 2019-06-25 ENCOUNTER — PRENATAL OFFICE VISIT (OUTPATIENT)
Dept: FAMILY MEDICINE | Facility: CLINIC | Age: 29
End: 2019-06-25
Payer: COMMERCIAL

## 2019-06-25 VITALS
SYSTOLIC BLOOD PRESSURE: 142 MMHG | HEART RATE: 128 BPM | OXYGEN SATURATION: 100 % | HEIGHT: 65 IN | DIASTOLIC BLOOD PRESSURE: 88 MMHG | RESPIRATION RATE: 18 BRPM | BODY MASS INDEX: 29.26 KG/M2 | WEIGHT: 175.6 LBS | TEMPERATURE: 97.9 F

## 2019-06-25 DIAGNOSIS — R03.0 ELEVATED BLOOD PRESSURE READING WITHOUT DIAGNOSIS OF HYPERTENSION: ICD-10-CM

## 2019-06-25 DIAGNOSIS — Z34.81 ENCOUNTER FOR SUPERVISION OF OTHER NORMAL PREGNANCY, FIRST TRIMESTER: Primary | ICD-10-CM

## 2019-06-25 DIAGNOSIS — Z34.92 ENCOUNTER FOR SUPERVISION OF NORMAL PREGNANCY IN SECOND TRIMESTER: ICD-10-CM

## 2019-06-25 DIAGNOSIS — Z87.59 HISTORY OF PRE-ECLAMPSIA: ICD-10-CM

## 2019-06-25 PROCEDURE — 87591 N.GONORRHOEAE DNA AMP PROB: CPT | Performed by: FAMILY MEDICINE

## 2019-06-25 PROCEDURE — 99207 ZZC FIRST OB VISIT: CPT | Performed by: FAMILY MEDICINE

## 2019-06-25 PROCEDURE — 87491 CHLMYD TRACH DNA AMP PROBE: CPT | Performed by: FAMILY MEDICINE

## 2019-06-25 ASSESSMENT — MIFFLIN-ST. JEOR: SCORE: 1539.89

## 2019-06-25 ASSESSMENT — PAIN SCALES - GENERAL: PAINLEVEL: NO PAIN (0)

## 2019-06-25 NOTE — PROGRESS NOTES
Neo Parra is a 28 year old,  female who presents alone for 1st OB visit.   Tushar could not be here today.  She has talked with OB Ed prior to today's visit.  She is 11w2d by early ultrasound at 7 4/7 weeks which was off by 5 days from her best guess LMP.   She has no concerns.   She is feeling well overall.  She does have some nausea, no vomiting. No bleeding, pain or other concerns.     I reviewed her previous OB history as follows:  OB History    Para Term  AB Living   3 2 2 0 0 2   SAB TAB Ectopic Multiple Live Births   0 0 0 0 2      # Outcome Date GA Lbr Miguel Ángel/2nd Weight Sex Delivery Anes PTL Lv   3 Current            2 Term 10/06/18 39w1d 01:48 / 00:10 3.94 kg (8 lb 11 oz) F Vag-Spont EPI N MO      Complications: Preeclampsia/Hypertension      Name: Felicitas      Apgar1: 9  Apgar5: 9   1 Term 16 39w4d 05:25 / 02:11 3.779 kg (8 lb 5.3 oz) F Vag-Spont EPI N MO      Name: Erika      Apgar1: 8  Apgar5: 9      Obstetric Comments   EDC 2020   based on early us.   to Tushar.   .      Past Medical History:   Diagnosis Date     Gestational hypertension 10/6/2018     Preeclampsia 10/6/2018      Patient Active Problem List    Diagnosis Date Noted     Migraine with aura and without status migrainosus, not intractable 2018     Priority: Medium     CARDIOVASCULAR SCREENING; LDL GOAL LESS THAN 160 2011     Priority: Medium      O:  Vitals noted.  Patient alert, oriented, and in no acute distress.    Eyes: PERRLA, EOMI.  Ears:  Canals clear, TM's nl bilaterally.  No erythema or fluid.   Nares patent without inflammation or drainage.   Oral:  Oropharynx nl without erythema, exudate, mass or other lesions.   Neck:  Supple without lymphadenopathy, JVD or masses.  CV:  RRR without murmur.  Respiratory:  Lungs clear to auscultation bilaterally.  Breasts:  not examined today.   Abdomen:  Soft, nontender, nondistended with good bowel sounds and no masses or hepatosplenomegaly.   Uterus is not palpable is the abdomen.   Fetal heart tones were heard as noted in OB flowsheet.    Vaginal exam reveals normal external and internal genitalia.  Cervix is closed, long and thick.  No lesions or abnormalities seen.  Bimanual exam reveals a nontender, gravid uterus consistant with 12 weeks with no CMT.  No adnexal masses or tenderness.   Extremities are normal without edema or lesions.    Routine labs were sent, including gen probe.    A:  First OB visit  Low risk  Elevated BP without diagnosis of HTN.   Previous gestational hypertension, mild preeclampsia.     P:  Discussed routine pregnancy care, schedule of visits, nutrition, exercise, travel, answered questions.    I reviewed her prior pregnancy and delivery notes. She had symptoms concerning for preeclampsia with her last pregnancy, but never had lab abnormalities or needed treatment.   Her BP is mildly elevated today but not high enough to warrant treatment. I have ordered her a BP cuff and will check 1-2 times per week for the next few weeks and update me with her values.    We will then discuss whether ASA would be more beneficial than risky. At this time I think her overall risk for preeclampsia is low but not zero.   Will follow up in 4 weeks or sooner with any concerns.  Will call with lab results.    Melanie Bartlett MD

## 2019-06-26 RX ORDER — ASPIRIN 81 MG/1
81 TABLET, CHEWABLE ORAL DAILY
Qty: 93 TABLET | Refills: 3 | Status: ON HOLD | OUTPATIENT
Start: 2019-06-26 | End: 2020-01-08

## 2019-07-23 ENCOUNTER — PRENATAL OFFICE VISIT (OUTPATIENT)
Dept: FAMILY MEDICINE | Facility: CLINIC | Age: 29
End: 2019-07-23
Payer: COMMERCIAL

## 2019-07-23 VITALS
HEART RATE: 121 BPM | HEIGHT: 65 IN | OXYGEN SATURATION: 100 % | BODY MASS INDEX: 29.59 KG/M2 | WEIGHT: 177.6 LBS | RESPIRATION RATE: 20 BRPM | TEMPERATURE: 98.6 F | DIASTOLIC BLOOD PRESSURE: 80 MMHG | SYSTOLIC BLOOD PRESSURE: 150 MMHG

## 2019-07-23 DIAGNOSIS — Z34.82 ENCOUNTER FOR SUPERVISION OF OTHER NORMAL PREGNANCY IN SECOND TRIMESTER: Primary | ICD-10-CM

## 2019-07-23 DIAGNOSIS — R03.0 ELEVATED BLOOD PRESSURE READING WITHOUT DIAGNOSIS OF HYPERTENSION: ICD-10-CM

## 2019-07-23 PROCEDURE — 99207 ZZC COMPLICATED OB VISIT: CPT | Performed by: FAMILY MEDICINE

## 2019-07-23 ASSESSMENT — PAIN SCALES - GENERAL: PAINLEVEL: NO PAIN (0)

## 2019-07-23 ASSESSMENT — MIFFLIN-ST. JEOR: SCORE: 1546.47

## 2019-07-23 NOTE — PATIENT INSTRUCTIONS
Please check your blood pressure outside of the clinic three times per week and record it.   Please update me after two checks, via [employer].  Our goal is under 130/80.   Also you can bring in your blood pressure cuff and ask the nurse to check your blood pressure with our cuff and yours together.   Melanie Bartlett MD

## 2019-07-24 NOTE — PROGRESS NOTES
Doing well. BP is noted to be elevated today. She is checking BP at home, MUCH better readings at home, 110s systolic.   Will have her bring her cuff in to make sure it is reading accurately.  We will continue checking 3 times a week and update me in 2 weeks.  She continues on her aspirin.  Declines afp.   Has routine sono set up at 20 weeks.   I could hear fetal movement on FHT exam today.  Discussed quickening.   Will follow up in 4 weeks, sooner prn.   Melanie Bartlett MD

## 2019-08-20 ENCOUNTER — PRENATAL OFFICE VISIT (OUTPATIENT)
Dept: FAMILY MEDICINE | Facility: CLINIC | Age: 29
End: 2019-08-20
Payer: COMMERCIAL

## 2019-08-20 VITALS
WEIGHT: 180 LBS | HEART RATE: 114 BPM | BODY MASS INDEX: 29.95 KG/M2 | OXYGEN SATURATION: 99 % | RESPIRATION RATE: 16 BRPM | DIASTOLIC BLOOD PRESSURE: 90 MMHG | SYSTOLIC BLOOD PRESSURE: 140 MMHG | TEMPERATURE: 97.5 F

## 2019-08-20 DIAGNOSIS — R42 DIZZINESS: ICD-10-CM

## 2019-08-20 DIAGNOSIS — Z34.82 ENCOUNTER FOR SUPERVISION OF OTHER NORMAL PREGNANCY IN SECOND TRIMESTER: Primary | ICD-10-CM

## 2019-08-20 DIAGNOSIS — O10.012 PRE-EXISTING ESSENTIAL HYPERTENSION DURING PREGNANCY IN SECOND TRIMESTER: ICD-10-CM

## 2019-08-20 DIAGNOSIS — E61.1 IRON DEFICIENCY: ICD-10-CM

## 2019-08-20 LAB
ANION GAP SERPL CALCULATED.3IONS-SCNC: 7 MMOL/L (ref 3–14)
BUN SERPL-MCNC: 8 MG/DL (ref 7–30)
CALCIUM SERPL-MCNC: 8.4 MG/DL (ref 8.5–10.1)
CHLORIDE SERPL-SCNC: 106 MMOL/L (ref 94–109)
CO2 SERPL-SCNC: 25 MMOL/L (ref 20–32)
CREAT SERPL-MCNC: 0.51 MG/DL (ref 0.52–1.04)
FERRITIN SERPL-MCNC: 8 NG/ML (ref 12–150)
GFR SERPL CREATININE-BSD FRML MDRD: >90 ML/MIN/{1.73_M2}
GLUCOSE SERPL-MCNC: 73 MG/DL (ref 70–99)
HGB BLD-MCNC: 12.3 G/DL (ref 11.7–15.7)
POTASSIUM SERPL-SCNC: 3.5 MMOL/L (ref 3.4–5.3)
SODIUM SERPL-SCNC: 138 MMOL/L (ref 133–144)
TSH SERPL DL<=0.005 MIU/L-ACNC: 2.51 MU/L (ref 0.4–4)

## 2019-08-20 PROCEDURE — 85018 HEMOGLOBIN: CPT | Performed by: FAMILY MEDICINE

## 2019-08-20 PROCEDURE — 99207 ZZC COMPLICATED OB VISIT: CPT | Performed by: FAMILY MEDICINE

## 2019-08-20 PROCEDURE — 36415 COLL VENOUS BLD VENIPUNCTURE: CPT | Performed by: FAMILY MEDICINE

## 2019-08-20 PROCEDURE — 82728 ASSAY OF FERRITIN: CPT | Performed by: FAMILY MEDICINE

## 2019-08-20 PROCEDURE — 80048 BASIC METABOLIC PNL TOTAL CA: CPT | Performed by: FAMILY MEDICINE

## 2019-08-20 PROCEDURE — 84443 ASSAY THYROID STIM HORMONE: CPT | Performed by: FAMILY MEDICINE

## 2019-08-20 ASSESSMENT — PAIN SCALES - GENERAL: PAINLEVEL: NO PAIN (0)

## 2019-08-20 NOTE — PROGRESS NOTES
Doing fair overall.  No bleeding, no regular pains or cramps.   Feeling dizzy, like vertigo.  Has been for the past week.  Had been on a boat for only a few hours, may have started then but not sure, and hasn't improved.   Has been monitoring her BP at home, readings all under 135/85 (no alarms on monitor).  Highest reading 128/70s.  Most 108-119/68-72.  Has checked her BP during vertigo and no change in readings. No cold or allergy symptoms.   She brings in her BP cuff for comparison. Ours today read 140/90, hers read 148/89.    Has appt for ultrasound on Monday.   On exam, Ears:  Canals clear, TM's nl bilaterally.  No erythema or fluid. Eyes:  PERRLA, EOMI. Oral:  Oropharynx nl without erythema, exudate, mass or other lesions.   Neck:  Supple without lymphadenopathy, JVD or masses.   CV:  RRR without murmur.   Respiratory:  Lungs clear to auscultation bilaterally.   Will get some labs due to dizzyness. Lengthy discussion of risks and benefits of treating mild HTN. No severe readings.   At this time I don't think the benefits of treatment will outweigh the potential risk, with her home readings being much lower, concern for hypotension or fetal effects from hypotension.will continue close monitoring.   Discussed fetal kick counts after 22 weeks.  Will follow up in 4 weeks, sooner prn.   Melanie Bartlett MD

## 2019-08-26 ENCOUNTER — HOSPITAL ENCOUNTER (OUTPATIENT)
Dept: ULTRASOUND IMAGING | Facility: CLINIC | Age: 29
Discharge: HOME | End: 2019-08-26
Attending: FAMILY MEDICINE | Admitting: FAMILY MEDICINE
Payer: COMMERCIAL

## 2019-08-26 DIAGNOSIS — Z34.92 ENCOUNTER FOR SUPERVISION OF NORMAL PREGNANCY IN SECOND TRIMESTER: ICD-10-CM

## 2019-08-26 PROCEDURE — 76805 OB US >/= 14 WKS SNGL FETUS: CPT

## 2019-09-06 RX ORDER — FERROUS GLUCONATE 324(38)MG
324 TABLET ORAL 2 TIMES DAILY WITH MEALS
Qty: 180 TABLET | Refills: 1 | Status: SHIPPED | OUTPATIENT
Start: 2019-09-06 | End: 2021-09-14

## 2019-09-06 NOTE — RESULT ENCOUNTER NOTE
Aylana, your iron level is VERY low. I recommend an iron supplement for you. I'm sending in a Rx to the pharmacy. This is common in pregnancy especially, and in women in general.   Your other labs look just fine.   Melanie Bartlett MD

## 2019-09-17 ENCOUNTER — PRENATAL OFFICE VISIT (OUTPATIENT)
Dept: FAMILY MEDICINE | Facility: CLINIC | Age: 29
End: 2019-09-17
Payer: COMMERCIAL

## 2019-09-17 VITALS
WEIGHT: 184 LBS | BODY MASS INDEX: 30.66 KG/M2 | RESPIRATION RATE: 16 BRPM | HEART RATE: 74 BPM | HEIGHT: 65 IN | SYSTOLIC BLOOD PRESSURE: 146 MMHG | DIASTOLIC BLOOD PRESSURE: 90 MMHG | TEMPERATURE: 98.2 F | OXYGEN SATURATION: 99 %

## 2019-09-17 DIAGNOSIS — G43.109 MIGRAINE WITH AURA AND WITHOUT STATUS MIGRAINOSUS, NOT INTRACTABLE: ICD-10-CM

## 2019-09-17 DIAGNOSIS — O09.92 SUPERVISION OF HIGH RISK PREGNANCY IN SECOND TRIMESTER: Primary | ICD-10-CM

## 2019-09-17 DIAGNOSIS — O10.012 PRE-EXISTING ESSENTIAL HYPERTENSION DURING PREGNANCY IN SECOND TRIMESTER: ICD-10-CM

## 2019-09-17 PROBLEM — Z34.82 ENCOUNTER FOR SUPERVISION OF OTHER NORMAL PREGNANCY IN SECOND TRIMESTER: Status: RESOLVED | Noted: 2018-09-26 | Resolved: 2019-09-17

## 2019-09-17 PROBLEM — R03.0 ELEVATED BLOOD PRESSURE READING WITHOUT DIAGNOSIS OF HYPERTENSION: Status: RESOLVED | Noted: 2019-07-23 | Resolved: 2019-09-17

## 2019-09-17 PROCEDURE — 99207 ZZC COMPLICATED OB VISIT: CPT | Performed by: FAMILY MEDICINE

## 2019-09-17 ASSESSMENT — PAIN SCALES - GENERAL: PAINLEVEL: NO PAIN (0)

## 2019-09-17 ASSESSMENT — MIFFLIN-ST. JEOR: SCORE: 1575.5

## 2019-09-18 NOTE — PROGRESS NOTES
Doing well overall.  No bleeding, no regular ctx. Her dizzyness is better. She still gets headaches but nothing new, just once in a while. No vision change. No n/v.   Baby active.   GTT, Hgb, RPR next visit in 4 weeks.  Will start BPP and growth us at 29 weeks, then BPP with every visit, growth us every 3-4 weeks.   Will offer flu vaccine next visit, out of stock today.   Tdap next visit.   Discussed  labor.   Discussed warning signs for preeclampsia.  Will f/u sooner with any concern for decreased fetal movement, vaginal bleeding, fluid leak, headache, vision change, severe nausea or vomiting, abdominal pain, increased edema or other concerns.   Melanie Bartlett MD

## 2019-10-17 ENCOUNTER — PRENATAL OFFICE VISIT (OUTPATIENT)
Dept: FAMILY MEDICINE | Facility: CLINIC | Age: 29
End: 2019-10-17
Payer: COMMERCIAL

## 2019-10-17 VITALS
TEMPERATURE: 97.7 F | BODY MASS INDEX: 31.72 KG/M2 | DIASTOLIC BLOOD PRESSURE: 84 MMHG | OXYGEN SATURATION: 98 % | RESPIRATION RATE: 14 BRPM | HEART RATE: 79 BPM | SYSTOLIC BLOOD PRESSURE: 114 MMHG | WEIGHT: 190.6 LBS

## 2019-10-17 DIAGNOSIS — O10.012 PRE-EXISTING ESSENTIAL HYPERTENSION DURING PREGNANCY IN SECOND TRIMESTER: ICD-10-CM

## 2019-10-17 DIAGNOSIS — O09.92 SUPERVISION OF HIGH RISK PREGNANCY IN SECOND TRIMESTER: Primary | ICD-10-CM

## 2019-10-17 DIAGNOSIS — Z23 NEED FOR PROPHYLACTIC VACCINATION AND INOCULATION AGAINST INFLUENZA: ICD-10-CM

## 2019-10-17 LAB
GLUCOSE 1H P 50 G GLC PO SERPL-MCNC: 132 MG/DL (ref 60–129)
HGB BLD-MCNC: 11.1 G/DL (ref 11.7–15.7)

## 2019-10-17 PROCEDURE — 99207 ZZC COMPLICATED OB VISIT: CPT | Performed by: FAMILY MEDICINE

## 2019-10-17 PROCEDURE — 36415 COLL VENOUS BLD VENIPUNCTURE: CPT | Performed by: FAMILY MEDICINE

## 2019-10-17 PROCEDURE — 90471 IMMUNIZATION ADMIN: CPT | Performed by: FAMILY MEDICINE

## 2019-10-17 PROCEDURE — 86780 TREPONEMA PALLIDUM: CPT | Performed by: FAMILY MEDICINE

## 2019-10-17 PROCEDURE — 00000218 ZZHCL STATISTIC OBHBG - HEMOGLOBIN: Performed by: FAMILY MEDICINE

## 2019-10-17 PROCEDURE — 90686 IIV4 VACC NO PRSV 0.5 ML IM: CPT | Performed by: FAMILY MEDICINE

## 2019-10-17 PROCEDURE — 82950 GLUCOSE TEST: CPT | Performed by: FAMILY MEDICINE

## 2019-10-18 ENCOUNTER — TELEPHONE (OUTPATIENT)
Dept: FAMILY MEDICINE | Facility: CLINIC | Age: 29
End: 2019-10-18

## 2019-10-18 DIAGNOSIS — O99.810 ABNORMAL MATERNAL GLUCOSE TOLERANCE, ANTEPARTUM: Primary | ICD-10-CM

## 2019-10-18 NOTE — PROGRESS NOTES
Doing well overall.  No bleeding, no regular ctx.   BP elevated, normal on recheck.   Will start ultrasounds with next visit on 10/29. BPP and growth at that time, then weekly BPP and will repeat growth every 3rd week.   Discussed  labor.   Flu shot given today.   GTT, Hgb, RPR pending today.   Hold off Tdap due to history of possible allergy. Will review with allergist if he thinks repeat injection is warranted or safe.    Discussed warning signs for preeclampsia.  Will f/u in 2 week(s) or sooner with any concern for decreased fetal movement, vaginal bleeding, fluid leak, headache, vision change, severe nausea or vomiting, abdominal pain, increased edema or other concerns.   Melanie Bartlett MD

## 2019-10-18 NOTE — RESULT ENCOUNTER NOTE
See note to patient, please arrange/order 3 hour GTT.     Aylana, your blood count is just very minimally low, so just continue on your iron. Also, you just barely missed the diabetes test. I'd like you to do the 3 hour test. Please set up a lab appt for that in the next week or so.   Melanie Bartlett MD

## 2019-10-18 NOTE — TELEPHONE ENCOUNTER
----- Message from Melanie Bartlett MD sent at 10/18/2019  2:29 AM CDT -----  See note to patient, please arrange/order 3 hour GTT.     Aylana, your blood count is just very minimally low, so just continue on your iron. Also, you just barely missed the diabetes test. I'd like you to do the 3 hour test. Please set up a lab appt for that in the next week or so.   Melanie Bartlett MD

## 2019-10-19 LAB — T PALLIDUM AB SER QL: NONREACTIVE

## 2019-10-24 DIAGNOSIS — O99.810 ABNORMAL MATERNAL GLUCOSE TOLERANCE, ANTEPARTUM: ICD-10-CM

## 2019-10-24 LAB
GLUCOSE 1H P 100 G GLC PO SERPL-MCNC: 129 MG/DL (ref 60–179)
GLUCOSE 2H P 100 G GLC PO SERPL-MCNC: 125 MG/DL (ref 60–154)
GLUCOSE 3H P 100 G GLC PO SERPL-MCNC: 104 MG/DL (ref 60–139)
GLUCOSE P FAST SERPL-MCNC: 80 MG/DL (ref 60–94)

## 2019-10-24 PROCEDURE — 82951 GLUCOSE TOLERANCE TEST (GTT): CPT | Performed by: FAMILY MEDICINE

## 2019-10-24 PROCEDURE — 82952 GTT-ADDED SAMPLES: CPT | Performed by: FAMILY MEDICINE

## 2019-10-24 PROCEDURE — 36415 COLL VENOUS BLD VENIPUNCTURE: CPT | Performed by: FAMILY MEDICINE

## 2019-10-29 ENCOUNTER — HOSPITAL ENCOUNTER (OUTPATIENT)
Dept: ULTRASOUND IMAGING | Facility: CLINIC | Age: 29
Discharge: HOME | End: 2019-10-29
Attending: FAMILY MEDICINE | Admitting: FAMILY MEDICINE
Payer: COMMERCIAL

## 2019-10-29 ENCOUNTER — PRENATAL OFFICE VISIT (OUTPATIENT)
Dept: FAMILY MEDICINE | Facility: CLINIC | Age: 29
End: 2019-10-29
Payer: COMMERCIAL

## 2019-10-29 VITALS
DIASTOLIC BLOOD PRESSURE: 68 MMHG | WEIGHT: 190.8 LBS | OXYGEN SATURATION: 100 % | HEART RATE: 100 BPM | BODY MASS INDEX: 31.75 KG/M2 | SYSTOLIC BLOOD PRESSURE: 134 MMHG | TEMPERATURE: 98.9 F

## 2019-10-29 DIAGNOSIS — O10.012 PRE-EXISTING ESSENTIAL HYPERTENSION DURING PREGNANCY IN SECOND TRIMESTER: ICD-10-CM

## 2019-10-29 DIAGNOSIS — O09.92 SUPERVISION OF HIGH RISK PREGNANCY IN SECOND TRIMESTER: ICD-10-CM

## 2019-10-29 DIAGNOSIS — O09.93 SUPERVISION OF HIGH RISK PREGNANCY IN THIRD TRIMESTER: Primary | ICD-10-CM

## 2019-10-29 PROCEDURE — 99207 ZZC COMPLICATED OB VISIT: CPT | Performed by: FAMILY MEDICINE

## 2019-10-29 PROCEDURE — 76819 FETAL BIOPHYS PROFIL W/O NST: CPT

## 2019-11-04 NOTE — PROGRESS NOTES
Doing well overall.  No bleeding, no regular ctx, just occasional.   Had BPP today, scored 8/8. Baby at 45% for size.  Cervix long at 4.65 cm, normal DERIK.   Discussed  labor.   BP better today.   Will continue weekly BPP.   Discussed no Tdap due to potential for allergy. Discussed with allergist, and he advised that we could give it with supervision and treatment if needed, but since she had one in  and again in 2018, I feel the benefit is small for the potential risk.   Discussed warning signs for preeclampsia.  Will f/u with me in 2 week(s) or sooner with any concern for decreased fetal movement, vaginal bleeding, fluid leak, headache, vision change, severe nausea or vomiting, abdominal pain, increased edema or other concerns.   Melanie Bartlett MD

## 2019-11-05 ENCOUNTER — HOSPITAL ENCOUNTER (OUTPATIENT)
Dept: ULTRASOUND IMAGING | Facility: CLINIC | Age: 29
Discharge: HOME | End: 2019-11-05
Attending: FAMILY MEDICINE | Admitting: FAMILY MEDICINE
Payer: COMMERCIAL

## 2019-11-05 DIAGNOSIS — O10.012 PRE-EXISTING ESSENTIAL HYPERTENSION DURING PREGNANCY IN SECOND TRIMESTER: ICD-10-CM

## 2019-11-05 DIAGNOSIS — O09.92 SUPERVISION OF HIGH RISK PREGNANCY IN SECOND TRIMESTER: ICD-10-CM

## 2019-11-05 PROCEDURE — 76819 FETAL BIOPHYS PROFIL W/O NST: CPT

## 2019-11-06 ENCOUNTER — HEALTH MAINTENANCE LETTER (OUTPATIENT)
Age: 29
End: 2019-11-06

## 2019-11-06 NOTE — RESULT ENCOUNTER NOTE
Neo, your baby's ultrasound looks good, baby is head down and scored 8 out of 8 points.   Melanie Bartlett MD

## 2019-11-12 ENCOUNTER — PRENATAL OFFICE VISIT (OUTPATIENT)
Dept: FAMILY MEDICINE | Facility: CLINIC | Age: 29
End: 2019-11-12
Payer: COMMERCIAL

## 2019-11-12 ENCOUNTER — HOSPITAL ENCOUNTER (OUTPATIENT)
Dept: ULTRASOUND IMAGING | Facility: CLINIC | Age: 29
Discharge: HOME | End: 2019-11-12
Attending: FAMILY MEDICINE | Admitting: FAMILY MEDICINE
Payer: COMMERCIAL

## 2019-11-12 VITALS
HEART RATE: 120 BPM | TEMPERATURE: 96.6 F | RESPIRATION RATE: 18 BRPM | SYSTOLIC BLOOD PRESSURE: 122 MMHG | OXYGEN SATURATION: 99 % | BODY MASS INDEX: 32.45 KG/M2 | DIASTOLIC BLOOD PRESSURE: 82 MMHG | WEIGHT: 195 LBS

## 2019-11-12 DIAGNOSIS — O09.92 SUPERVISION OF HIGH RISK PREGNANCY IN SECOND TRIMESTER: ICD-10-CM

## 2019-11-12 DIAGNOSIS — O10.012 PRE-EXISTING ESSENTIAL HYPERTENSION DURING PREGNANCY IN SECOND TRIMESTER: ICD-10-CM

## 2019-11-12 DIAGNOSIS — O09.93 SUPERVISION OF HIGH RISK PREGNANCY IN THIRD TRIMESTER: Primary | ICD-10-CM

## 2019-11-12 PROCEDURE — 76819 FETAL BIOPHYS PROFIL W/O NST: CPT

## 2019-11-12 PROCEDURE — 99207 ZZC COMPLICATED OB VISIT: CPT | Performed by: FAMILY MEDICINE

## 2019-11-12 ASSESSMENT — PAIN SCALES - GENERAL: PAINLEVEL: NO PAIN (0)

## 2019-11-12 NOTE — PROGRESS NOTES
Doing well overall.  No bleeding, no regular ctx.   Her weight is up but BP still normal, no edema. No headache, vision change, RUQ pain, n/v.   Baby active   BPP today  but DERIK climbing, will continue to monitor  Discussed  labor.   Discussed warning signs for preeclampsia.  Will f/u for BPP in 1 week and visit in 2 week(s) or sooner with any concern for decreased fetal movement, vaginal bleeding, fluid leak, headache, vision change, severe nausea or vomiting, abdominal pain, increased edema or other concerns.   Melanie Bartlett MD

## 2019-11-19 ENCOUNTER — HOSPITAL ENCOUNTER (OUTPATIENT)
Dept: ULTRASOUND IMAGING | Facility: CLINIC | Age: 29
Discharge: HOME | End: 2019-11-19
Attending: FAMILY MEDICINE | Admitting: FAMILY MEDICINE
Payer: COMMERCIAL

## 2019-11-19 DIAGNOSIS — O10.012 PRE-EXISTING ESSENTIAL HYPERTENSION DURING PREGNANCY IN SECOND TRIMESTER: ICD-10-CM

## 2019-11-19 DIAGNOSIS — O09.92 SUPERVISION OF HIGH RISK PREGNANCY IN SECOND TRIMESTER: ICD-10-CM

## 2019-11-19 PROCEDURE — 76819 FETAL BIOPHYS PROFIL W/O NST: CPT

## 2019-11-26 ENCOUNTER — HOSPITAL ENCOUNTER (OUTPATIENT)
Dept: ULTRASOUND IMAGING | Facility: CLINIC | Age: 29
Discharge: HOME | End: 2019-11-26
Attending: FAMILY MEDICINE | Admitting: FAMILY MEDICINE
Payer: COMMERCIAL

## 2019-11-26 ENCOUNTER — PRENATAL OFFICE VISIT (OUTPATIENT)
Dept: FAMILY MEDICINE | Facility: CLINIC | Age: 29
End: 2019-11-26
Payer: COMMERCIAL

## 2019-11-26 VITALS
DIASTOLIC BLOOD PRESSURE: 74 MMHG | WEIGHT: 196 LBS | SYSTOLIC BLOOD PRESSURE: 132 MMHG | HEART RATE: 88 BPM | TEMPERATURE: 97.5 F | RESPIRATION RATE: 16 BRPM | BODY MASS INDEX: 32.62 KG/M2 | OXYGEN SATURATION: 99 %

## 2019-11-26 DIAGNOSIS — O09.93 SUPERVISION OF HIGH RISK PREGNANCY IN THIRD TRIMESTER: Primary | ICD-10-CM

## 2019-11-26 DIAGNOSIS — O10.012 PRE-EXISTING ESSENTIAL HYPERTENSION DURING PREGNANCY IN SECOND TRIMESTER: ICD-10-CM

## 2019-11-26 DIAGNOSIS — O09.92 SUPERVISION OF HIGH RISK PREGNANCY IN SECOND TRIMESTER: ICD-10-CM

## 2019-11-26 DIAGNOSIS — O10.013 PRE-EXISTING ESSENTIAL HYPERTENSION DURING PREGNANCY IN THIRD TRIMESTER: ICD-10-CM

## 2019-11-26 PROCEDURE — 99207 ZZC COMPLICATED OB VISIT: CPT | Performed by: FAMILY MEDICINE

## 2019-11-26 PROCEDURE — 76819 FETAL BIOPHYS PROFIL W/O NST: CPT

## 2019-11-26 ASSESSMENT — PAIN SCALES - GENERAL: PAINLEVEL: NO PAIN (0)

## 2019-11-26 NOTE — PROGRESS NOTES
Doing well overall.  No bleeding, no regular ctx. No fluid leak. Baby active.   BPP 88 today.   Wondering what to take for heartburn, advised TUMS, Zantac if available, and Prilosec as well as dietary precautions.   Discussed  labor.   Discussed warning signs for preeclampsia.  Will f/u with BPP in 1 week, and visit with me in 2 week(s) or sooner with any concern for decreased fetal movement, vaginal bleeding, fluid leak, headache, vision change, severe nausea or vomiting, abdominal pain, increased edema or other concerns.   Melanie Bartlett MD

## 2019-11-26 NOTE — PATIENT INSTRUCTIONS
For heartburn you can take TUMS, Ranitidine (which may be currently unavailable due to recalls), and Prilosec (omeprazole).    You can also take Maalox or Mylanta.      Try to eat smaller meals so you're not too full at any time, and don't eat right before bed. Laying down with a full stomach can make it worse.     Melanie Bartlett MD

## 2019-12-03 ENCOUNTER — HOSPITAL ENCOUNTER (OUTPATIENT)
Dept: ULTRASOUND IMAGING | Facility: CLINIC | Age: 29
Discharge: HOME | End: 2019-12-03
Attending: FAMILY MEDICINE | Admitting: FAMILY MEDICINE
Payer: COMMERCIAL

## 2019-12-03 DIAGNOSIS — O10.012 PRE-EXISTING ESSENTIAL HYPERTENSION DURING PREGNANCY IN SECOND TRIMESTER: ICD-10-CM

## 2019-12-03 DIAGNOSIS — O09.92 SUPERVISION OF HIGH RISK PREGNANCY IN SECOND TRIMESTER: ICD-10-CM

## 2019-12-03 PROCEDURE — 76819 FETAL BIOPHYS PROFIL W/O NST: CPT

## 2019-12-10 ENCOUNTER — HOSPITAL ENCOUNTER (OUTPATIENT)
Dept: ULTRASOUND IMAGING | Facility: CLINIC | Age: 29
Discharge: HOME | End: 2019-12-10
Attending: FAMILY MEDICINE | Admitting: FAMILY MEDICINE
Payer: COMMERCIAL

## 2019-12-10 ENCOUNTER — PRENATAL OFFICE VISIT (OUTPATIENT)
Dept: FAMILY MEDICINE | Facility: CLINIC | Age: 29
End: 2019-12-10
Payer: COMMERCIAL

## 2019-12-10 VITALS
TEMPERATURE: 97.8 F | RESPIRATION RATE: 16 BRPM | SYSTOLIC BLOOD PRESSURE: 142 MMHG | HEIGHT: 65 IN | BODY MASS INDEX: 33.05 KG/M2 | DIASTOLIC BLOOD PRESSURE: 92 MMHG | WEIGHT: 198.4 LBS | HEART RATE: 104 BPM | OXYGEN SATURATION: 98 %

## 2019-12-10 DIAGNOSIS — O10.012 PRE-EXISTING ESSENTIAL HYPERTENSION DURING PREGNANCY IN SECOND TRIMESTER: ICD-10-CM

## 2019-12-10 DIAGNOSIS — O09.92 SUPERVISION OF HIGH RISK PREGNANCY IN SECOND TRIMESTER: ICD-10-CM

## 2019-12-10 DIAGNOSIS — O09.93 SUPERVISION OF HIGH RISK PREGNANCY IN THIRD TRIMESTER: Primary | ICD-10-CM

## 2019-12-10 DIAGNOSIS — O10.013 PRE-EXISTING ESSENTIAL HYPERTENSION DURING PREGNANCY IN THIRD TRIMESTER: ICD-10-CM

## 2019-12-10 LAB
ALP SERPL-CCNC: 147 U/L (ref 40–150)
ALT SERPL W P-5'-P-CCNC: 15 U/L (ref 0–50)
ANION GAP SERPL CALCULATED.3IONS-SCNC: 5 MMOL/L (ref 3–14)
AST SERPL W P-5'-P-CCNC: 15 U/L (ref 0–45)
BUN SERPL-MCNC: 6 MG/DL (ref 7–30)
CALCIUM SERPL-MCNC: 8.5 MG/DL (ref 8.5–10.1)
CHLORIDE SERPL-SCNC: 108 MMOL/L (ref 94–109)
CO2 SERPL-SCNC: 25 MMOL/L (ref 20–32)
CREAT SERPL-MCNC: 0.58 MG/DL (ref 0.52–1.04)
CREAT UR-MCNC: 34 MG/DL
ERYTHROCYTE [DISTWIDTH] IN BLOOD BY AUTOMATED COUNT: 12 % (ref 10–15)
GFR SERPL CREATININE-BSD FRML MDRD: >90 ML/MIN/{1.73_M2}
GLUCOSE SERPL-MCNC: 74 MG/DL (ref 70–99)
HCT VFR BLD AUTO: 33.2 % (ref 35–47)
HGB BLD-MCNC: 10.7 G/DL (ref 11.7–15.7)
MCH RBC QN AUTO: 27.5 PG (ref 26.5–33)
MCHC RBC AUTO-ENTMCNC: 32.2 G/DL (ref 31.5–36.5)
MCV RBC AUTO: 85 FL (ref 78–100)
PLATELET # BLD AUTO: 202 10E9/L (ref 150–450)
POTASSIUM SERPL-SCNC: 3.4 MMOL/L (ref 3.4–5.3)
PROT UR-MCNC: <0.05 G/L
PROT/CREAT 24H UR: NORMAL G/G CR (ref 0–0.2)
RBC # BLD AUTO: 3.89 10E12/L (ref 3.8–5.2)
SODIUM SERPL-SCNC: 138 MMOL/L (ref 133–144)
URATE SERPL-MCNC: 3.7 MG/DL (ref 2.6–6)
WBC # BLD AUTO: 10.7 10E9/L (ref 4–11)

## 2019-12-10 PROCEDURE — 87653 STREP B DNA AMP PROBE: CPT | Performed by: FAMILY MEDICINE

## 2019-12-10 PROCEDURE — 84460 ALANINE AMINO (ALT) (SGPT): CPT | Performed by: FAMILY MEDICINE

## 2019-12-10 PROCEDURE — 84156 ASSAY OF PROTEIN URINE: CPT | Performed by: FAMILY MEDICINE

## 2019-12-10 PROCEDURE — 36415 COLL VENOUS BLD VENIPUNCTURE: CPT | Performed by: FAMILY MEDICINE

## 2019-12-10 PROCEDURE — 99207 ZZC COMPLICATED OB VISIT: CPT | Performed by: FAMILY MEDICINE

## 2019-12-10 PROCEDURE — 76819 FETAL BIOPHYS PROFIL W/O NST: CPT

## 2019-12-10 PROCEDURE — 84450 TRANSFERASE (AST) (SGOT): CPT | Performed by: FAMILY MEDICINE

## 2019-12-10 PROCEDURE — 84550 ASSAY OF BLOOD/URIC ACID: CPT | Performed by: FAMILY MEDICINE

## 2019-12-10 PROCEDURE — 84075 ASSAY ALKALINE PHOSPHATASE: CPT | Performed by: FAMILY MEDICINE

## 2019-12-10 PROCEDURE — 85027 COMPLETE CBC AUTOMATED: CPT | Performed by: FAMILY MEDICINE

## 2019-12-10 PROCEDURE — 80048 BASIC METABOLIC PNL TOTAL CA: CPT | Performed by: FAMILY MEDICINE

## 2019-12-10 ASSESSMENT — PAIN SCALES - GENERAL: PAINLEVEL: NO PAIN (0)

## 2019-12-10 ASSESSMENT — MIFFLIN-ST. JEOR: SCORE: 1640.82

## 2019-12-10 NOTE — PROGRESS NOTES
Concerns: ***  {[id number]}  Discussed kick counts and fetal movement.  Discussed PTL, PROM, and when to call or come in.  RTC 2 weeks.    Melanie Bartlett MD

## 2019-12-10 NOTE — PROGRESS NOTES
Doing well overall.  No bleeding, no regular ctx  No headaches, dizziness, visual blurriness, nausea, emesis, or other associated symptoms.   She says that she has been experiencing URI symptoms for the last  week. Two days ago, she experienced a sudden onset of maxillary sinus pain, left worse than right. This has persisted since. Her teeth also hurt. She denies a fever.   O: Ears:  Canals clear.Right and Left TM has a small amount of clear fluid behind it.  No erythema. Nose: Nares patent without inflammation or drainage. Oral:  Oropharynx nl without erythema, exudate, mass or other lesions. CV:  RRR without murmur. Respiratory:  Lungs clear to auscultation bilaterally. Cervix check done as above. GBS collected, will notify with results and discuss further evaluation/ treatment if needed at that time.   Discussed that her symptoms may be sinusitis but she should allow it to run its course for 7-10 days unless fever develops or symptoms intolerable.  Discussed supportive cares including use of humidifier, rest, warm showers, pushing fluids, tylenol/ibuprofen prn pain or fever, and OTC meds. Follow up with persisting/ worsening symptoms.   Growth U/S and BPP today: Growth U/S showed EFW of 2852 grams, or 6 lbs and 4 oz, at the 64th percentile. BPP is 8/8.   Her blood pressure was elevated at 142/94 mmHg when she presented to the clinic. This was rechecked after 15 minutes and it was 142/92 mmHg. Preeclampsia labs collected, will notify with results. If normal, will return for BP recheck in 3 days. If abnormal, may need induction or other close follow up. Reassured that BPP has already been done and is normal.   Discussed signs of labor and when to call or come in. Encouraged her to take it easy until 36 weeks and avoid intercourse until then, as she is already starting to dilate.   Discussed kick counts and fetal movement.  Reportable signs and symptoms discussed.  Follow up on Friday for a blood pressure check and  in 1 week for routine OB visit.   Melanie Bartlett MD

## 2019-12-11 ENCOUNTER — TELEPHONE (OUTPATIENT)
Dept: FAMILY MEDICINE | Facility: CLINIC | Age: 29
End: 2019-12-11

## 2019-12-11 LAB
GP B STREP DNA SPEC QL NAA+PROBE: NEGATIVE
SPECIMEN SOURCE: NORMAL

## 2019-12-11 NOTE — RESULT ENCOUNTER NOTE
[facility], please notify [facility] that Neo needs to be seen Friday this week for a blood pressure check.     Neo, your labs are all excellent, except your hemoglobin is slightly low. This is very common in pregnancy. No concerns for pre-eclampsia based on these labs. Continue on your iron. Please go into the clinic on Friday for a blood pressure check as we discussed.  I'll notify them that you will need to be seen by the [facility] nurse for a blood pressure check, and I'll see you next week. Please call or mychart with any questions.   Melanie Bartlett MD

## 2019-12-11 NOTE — TELEPHONE ENCOUNTER
----- Message from Melanie Bartlett MD sent at 12/11/2019  9:06 AM CST -----  [facility], please notify [facility] that Neo needs to be seen Friday this week for a blood pressure check.     Neo, your labs are all excellent, except your hemoglobin is slightly low. This is very common in pregnancy. No concerns for pre-eclampsia based on these labs. Continue on your iron. Please go into the clinic on Friday for a blood pressure check as we discussed.  I'll notify them that you will need to be seen by the [facility] nurse for a blood pressure check, and I'll see you next week. Please call or mychart with any questions.   Melanie Bartlett MD

## 2019-12-11 NOTE — TELEPHONE ENCOUNTER
Left message for call back. See msg. Below, and help patient scheduled on the float schedule in [place] for a BP check.     Shraddha Collier CMA ([facility])

## 2019-12-11 NOTE — TELEPHONE ENCOUNTER
[employer] message sent to patient to inform of need to contact the [facility] and [facility] Friday, 12/13. Lorrie Gunn LPN

## 2019-12-17 ENCOUNTER — HOSPITAL ENCOUNTER (OUTPATIENT)
Dept: ULTRASOUND IMAGING | Facility: CLINIC | Age: 29
Discharge: HOME | End: 2019-12-17
Attending: FAMILY MEDICINE | Admitting: FAMILY MEDICINE
Payer: COMMERCIAL

## 2019-12-17 ENCOUNTER — PRENATAL OFFICE VISIT (OUTPATIENT)
Dept: FAMILY MEDICINE | Facility: CLINIC | Age: 29
End: 2019-12-17
Payer: COMMERCIAL

## 2019-12-17 VITALS
TEMPERATURE: 98.1 F | RESPIRATION RATE: 16 BRPM | HEART RATE: 100 BPM | HEIGHT: 65 IN | SYSTOLIC BLOOD PRESSURE: 134 MMHG | OXYGEN SATURATION: 100 % | WEIGHT: 197 LBS | BODY MASS INDEX: 32.82 KG/M2 | DIASTOLIC BLOOD PRESSURE: 80 MMHG

## 2019-12-17 DIAGNOSIS — O09.93 SUPERVISION OF HIGH RISK PREGNANCY IN THIRD TRIMESTER: Primary | ICD-10-CM

## 2019-12-17 DIAGNOSIS — O10.012 PRE-EXISTING ESSENTIAL HYPERTENSION DURING PREGNANCY IN SECOND TRIMESTER: ICD-10-CM

## 2019-12-17 DIAGNOSIS — O09.92 SUPERVISION OF HIGH RISK PREGNANCY IN SECOND TRIMESTER: ICD-10-CM

## 2019-12-17 PROCEDURE — 76819 FETAL BIOPHYS PROFIL W/O NST: CPT

## 2019-12-17 PROCEDURE — 99207 ZZC COMPLICATED OB VISIT: CPT | Performed by: FAMILY MEDICINE

## 2019-12-17 ASSESSMENT — PAIN SCALES - GENERAL: PAINLEVEL: NO PAIN (0)

## 2019-12-17 ASSESSMENT — MIFFLIN-ST. JEOR: SCORE: 1634.47

## 2019-12-17 NOTE — PROGRESS NOTES
Doing well overall.  No bleeding, no regular ctx   No headache, nausea, vomiting, edema.   BP stable today. Labs have been normal. She did NOT come in last Friday for BP check, got too busy, but feels well.   Baby feels smaller than EFW given on last sono last week. Clinical estimate today 5-13, EFW on last us 1 week ago was 6-4.   Will have her get BPPs twice weekly from here on out. She also has another growth sono at end of December.   GBS negative.   Will be seeing Dr. Cantu in 1 week and Dr. Reyna in 2 weeks, then plan on induction on 1/6/20 if no labor.    Discussed that she may need induction sooner if any abnormalities occur on BPPs, or if BP rising or if labs done and are abnormal.   Discussed warning signs for preeclampsia.  Will f/u to birthplace sooner with ANY concerns, especially for decreased fetal movement, vaginal bleeding, fluid leak, headache, vision change, severe nausea or vomiting, abdominal pain, increased edema or other concerns.    Melanie Bartlett MD

## 2019-12-18 RX ORDER — MAGNESIUM SULFATE HEPTAHYDRATE 500 MG/ML
4 INJECTION, SOLUTION INTRAMUSCULAR; INTRAVENOUS
Status: CANCELLED | OUTPATIENT
Start: 2019-12-18

## 2019-12-18 RX ORDER — ONDANSETRON 2 MG/ML
4 INJECTION INTRAMUSCULAR; INTRAVENOUS EVERY 6 HOURS PRN
Status: CANCELLED | OUTPATIENT
Start: 2019-12-18

## 2019-12-18 RX ORDER — NALOXONE HYDROCHLORIDE 0.4 MG/ML
.1-.4 INJECTION, SOLUTION INTRAMUSCULAR; INTRAVENOUS; SUBCUTANEOUS
Status: CANCELLED | OUTPATIENT
Start: 2019-12-18

## 2019-12-18 RX ORDER — IBUPROFEN 400 MG/1
800 TABLET, FILM COATED ORAL
Status: CANCELLED | OUTPATIENT
Start: 2019-12-18

## 2019-12-18 RX ORDER — METHYLERGONOVINE MALEATE 0.2 MG/ML
200 INJECTION INTRAVENOUS
Status: CANCELLED | OUTPATIENT
Start: 2019-12-18

## 2019-12-18 RX ORDER — ACETAMINOPHEN 325 MG/1
650 TABLET ORAL EVERY 4 HOURS PRN
Status: CANCELLED | OUTPATIENT
Start: 2019-12-18

## 2019-12-18 RX ORDER — OXYTOCIN 10 [USP'U]/ML
10 INJECTION, SOLUTION INTRAMUSCULAR; INTRAVENOUS
Status: CANCELLED | OUTPATIENT
Start: 2019-12-18

## 2019-12-18 RX ORDER — LIDOCAINE 40 MG/G
CREAM TOPICAL
Status: CANCELLED | OUTPATIENT
Start: 2019-12-18

## 2019-12-18 RX ORDER — HYDRALAZINE HYDROCHLORIDE 20 MG/ML
10 INJECTION INTRAMUSCULAR; INTRAVENOUS
Status: CANCELLED | OUTPATIENT
Start: 2019-12-18

## 2019-12-18 RX ORDER — NIFEDIPINE 10 MG/1
10 CAPSULE ORAL
Status: CANCELLED | OUTPATIENT
Start: 2019-12-18

## 2019-12-18 RX ORDER — OXYTOCIN/0.9 % SODIUM CHLORIDE 30/500 ML
1-24 PLASTIC BAG, INJECTION (ML) INTRAVENOUS CONTINUOUS
Status: CANCELLED | OUTPATIENT
Start: 2019-12-18

## 2019-12-18 RX ORDER — FENTANYL CITRATE 50 UG/ML
50-100 INJECTION, SOLUTION INTRAMUSCULAR; INTRAVENOUS
Status: CANCELLED | OUTPATIENT
Start: 2019-12-18

## 2019-12-18 RX ORDER — CARBOPROST TROMETHAMINE 250 UG/ML
250 INJECTION, SOLUTION INTRAMUSCULAR
Status: CANCELLED | OUTPATIENT
Start: 2019-12-18

## 2019-12-18 RX ORDER — SODIUM CHLORIDE, SODIUM LACTATE, POTASSIUM CHLORIDE, CALCIUM CHLORIDE 600; 310; 30; 20 MG/100ML; MG/100ML; MG/100ML; MG/100ML
10-125 INJECTION, SOLUTION INTRAVENOUS CONTINUOUS
Status: CANCELLED | OUTPATIENT
Start: 2019-12-18

## 2019-12-18 RX ORDER — MAGNESIUM SULFATE HEPTAHYDRATE 40 MG/ML
4 INJECTION, SOLUTION INTRAVENOUS
Status: CANCELLED | OUTPATIENT
Start: 2019-12-18

## 2019-12-18 RX ORDER — MAGNESIUM SULFATE HEPTAHYDRATE 40 MG/ML
2 INJECTION, SOLUTION INTRAVENOUS
Status: CANCELLED | OUTPATIENT
Start: 2019-12-18

## 2019-12-18 RX ORDER — LORAZEPAM 2 MG/ML
2 INJECTION INTRAMUSCULAR
Status: CANCELLED | OUTPATIENT
Start: 2019-12-18

## 2019-12-18 RX ORDER — OXYTOCIN/0.9 % SODIUM CHLORIDE 30/500 ML
100-340 PLASTIC BAG, INJECTION (ML) INTRAVENOUS CONTINUOUS PRN
Status: CANCELLED | OUTPATIENT
Start: 2019-12-18

## 2019-12-18 RX ORDER — HYDRALAZINE HYDROCHLORIDE 20 MG/ML
5 INJECTION INTRAMUSCULAR; INTRAVENOUS
Status: CANCELLED | OUTPATIENT
Start: 2019-12-18

## 2019-12-18 RX ORDER — MISOPROSTOL 100 UG/1
25 TABLET ORAL EVERY 4 HOURS PRN
Status: CANCELLED | OUTPATIENT
Start: 2019-12-18

## 2019-12-18 RX ORDER — OXYCODONE AND ACETAMINOPHEN 5; 325 MG/1; MG/1
1 TABLET ORAL
Status: CANCELLED | OUTPATIENT
Start: 2019-12-18

## 2019-12-18 NOTE — RESULT ENCOUNTER NOTE
Neo, here is  Your latest result. All normal, 8 out of 8 points. You are set up for induction on 1/6/20 at 7:30 am but obviously we'll talk more between now and then depending on your progress.   Melanie Bartlett MD

## 2019-12-20 ENCOUNTER — HOSPITAL ENCOUNTER (OUTPATIENT)
Dept: ULTRASOUND IMAGING | Facility: CLINIC | Age: 29
Discharge: HOME | End: 2019-12-20
Attending: FAMILY MEDICINE | Admitting: FAMILY MEDICINE
Payer: COMMERCIAL

## 2019-12-20 DIAGNOSIS — O10.012 PRE-EXISTING ESSENTIAL HYPERTENSION DURING PREGNANCY IN SECOND TRIMESTER: ICD-10-CM

## 2019-12-20 DIAGNOSIS — O09.93 SUPERVISION OF HIGH RISK PREGNANCY IN THIRD TRIMESTER: ICD-10-CM

## 2019-12-20 PROCEDURE — 76819 FETAL BIOPHYS PROFIL W/O NST: CPT

## 2019-12-23 ENCOUNTER — PRENATAL OFFICE VISIT (OUTPATIENT)
Dept: FAMILY MEDICINE | Facility: CLINIC | Age: 29
End: 2019-12-23
Payer: COMMERCIAL

## 2019-12-23 ENCOUNTER — HOSPITAL ENCOUNTER (OUTPATIENT)
Dept: ULTRASOUND IMAGING | Facility: CLINIC | Age: 29
Discharge: HOME | End: 2019-12-23
Attending: FAMILY MEDICINE | Admitting: FAMILY MEDICINE
Payer: COMMERCIAL

## 2019-12-23 VITALS
DIASTOLIC BLOOD PRESSURE: 82 MMHG | BODY MASS INDEX: 33.15 KG/M2 | TEMPERATURE: 97.5 F | WEIGHT: 199.2 LBS | OXYGEN SATURATION: 99 % | RESPIRATION RATE: 16 BRPM | SYSTOLIC BLOOD PRESSURE: 125 MMHG | HEART RATE: 114 BPM

## 2019-12-23 DIAGNOSIS — O09.92 SUPERVISION OF HIGH RISK PREGNANCY IN SECOND TRIMESTER: ICD-10-CM

## 2019-12-23 DIAGNOSIS — O09.93 SUPERVISION OF HIGH RISK PREGNANCY IN THIRD TRIMESTER: Primary | ICD-10-CM

## 2019-12-23 DIAGNOSIS — O10.012 PRE-EXISTING ESSENTIAL HYPERTENSION DURING PREGNANCY IN SECOND TRIMESTER: ICD-10-CM

## 2019-12-23 DIAGNOSIS — O10.013 PRE-EXISTING ESSENTIAL HYPERTENSION DURING PREGNANCY IN THIRD TRIMESTER: ICD-10-CM

## 2019-12-23 PROCEDURE — 99207 ZZC PRENATAL VISIT: CPT | Performed by: FAMILY MEDICINE

## 2019-12-23 PROCEDURE — 76819 FETAL BIOPHYS PROFIL W/O NST: CPT

## 2019-12-23 ASSESSMENT — PAIN SCALES - GENERAL: PAINLEVEL: NO PAIN (0)

## 2019-12-26 ENCOUNTER — HOSPITAL ENCOUNTER (OUTPATIENT)
Dept: ULTRASOUND IMAGING | Facility: CLINIC | Age: 29
Discharge: HOME | End: 2019-12-26
Attending: FAMILY MEDICINE | Admitting: FAMILY MEDICINE
Payer: COMMERCIAL

## 2019-12-26 DIAGNOSIS — O09.93 SUPERVISION OF HIGH RISK PREGNANCY IN THIRD TRIMESTER: ICD-10-CM

## 2019-12-26 DIAGNOSIS — O10.012 PRE-EXISTING ESSENTIAL HYPERTENSION DURING PREGNANCY IN SECOND TRIMESTER: ICD-10-CM

## 2019-12-26 PROCEDURE — 76819 FETAL BIOPHYS PROFIL W/O NST: CPT

## 2019-12-30 ENCOUNTER — HOSPITAL ENCOUNTER (OUTPATIENT)
Dept: ULTRASOUND IMAGING | Facility: CLINIC | Age: 29
Discharge: HOME | End: 2019-12-30
Attending: FAMILY MEDICINE | Admitting: FAMILY MEDICINE
Payer: COMMERCIAL

## 2019-12-30 ENCOUNTER — PRENATAL OFFICE VISIT (OUTPATIENT)
Dept: FAMILY MEDICINE | Facility: CLINIC | Age: 29
End: 2019-12-30
Payer: COMMERCIAL

## 2019-12-30 VITALS
RESPIRATION RATE: 14 BRPM | OXYGEN SATURATION: 100 % | SYSTOLIC BLOOD PRESSURE: 126 MMHG | TEMPERATURE: 97.4 F | BODY MASS INDEX: 32.82 KG/M2 | DIASTOLIC BLOOD PRESSURE: 74 MMHG | WEIGHT: 197.2 LBS | HEART RATE: 78 BPM

## 2019-12-30 DIAGNOSIS — Z34.93 ENCOUNTER FOR PREGNANCY RELATED EXAMINATION IN THIRD TRIMESTER: Primary | ICD-10-CM

## 2019-12-30 DIAGNOSIS — O09.92 SUPERVISION OF HIGH RISK PREGNANCY IN SECOND TRIMESTER: ICD-10-CM

## 2019-12-30 DIAGNOSIS — O10.012 PRE-EXISTING ESSENTIAL HYPERTENSION DURING PREGNANCY IN SECOND TRIMESTER: ICD-10-CM

## 2019-12-30 PROCEDURE — 76819 FETAL BIOPHYS PROFIL W/O NST: CPT

## 2019-12-30 PROCEDURE — 99207 ZZC COMPLICATED OB VISIT: CPT | Performed by: OBSTETRICS & GYNECOLOGY

## 2019-12-30 ASSESSMENT — PAIN SCALES - GENERAL: PAINLEVEL: NO PAIN (0)

## 2019-12-30 NOTE — PROGRESS NOTES
She reports daily fetal movement and no concerns.  She denies any headaches or vision changes or concerns.  She is not taking any medication for hypertension and has done quite well recently.  She is getting twice weekly biophysical profiles.  She had one today and was told that it was 8 out of 8.  I am awaiting the report.  Prenatal flowsheet info reviewed as listed above.  The fetus is vertex presentation by Leoplold's maneuvers.   Fundi are benign- disc margins are sharp. No papilledema noted.  Extremities are without edema.  She has another biophysical profile planned for this Thursday.  I asked her to stop by here and give me a verbal report after it is done.  She is planning to have her induction next Monday with Dr. Bartlett.I reminded her  to recheck acutely if decreased fetal movement, ruptured membranes, vaginal bleeding or frequent ctx or pain, or if headaches, vision changes or significant edema develops.  ARIANE Reyna MD

## 2020-01-02 ENCOUNTER — HOSPITAL ENCOUNTER (OUTPATIENT)
Dept: ULTRASOUND IMAGING | Facility: CLINIC | Age: 30
Discharge: HOME | End: 2020-01-02
Attending: FAMILY MEDICINE | Admitting: FAMILY MEDICINE
Payer: COMMERCIAL

## 2020-01-02 DIAGNOSIS — O10.012 PRE-EXISTING ESSENTIAL HYPERTENSION DURING PREGNANCY IN SECOND TRIMESTER: ICD-10-CM

## 2020-01-02 DIAGNOSIS — O09.93 SUPERVISION OF HIGH RISK PREGNANCY IN THIRD TRIMESTER: ICD-10-CM

## 2020-01-02 PROCEDURE — 76819 FETAL BIOPHYS PROFIL W/O NST: CPT

## 2020-01-06 ENCOUNTER — ANESTHESIA EVENT (OUTPATIENT)
Dept: OBGYN | Facility: CLINIC | Age: 30
End: 2020-01-06
Payer: COMMERCIAL

## 2020-01-06 ENCOUNTER — HOSPITAL ENCOUNTER (INPATIENT)
Facility: CLINIC | Age: 30
LOS: 2 days | Discharge: HOME | End: 2020-01-08
Attending: FAMILY MEDICINE | Admitting: FAMILY MEDICINE
Payer: COMMERCIAL

## 2020-01-06 ENCOUNTER — ANESTHESIA (OUTPATIENT)
Dept: OBGYN | Facility: CLINIC | Age: 30
End: 2020-01-06
Payer: COMMERCIAL

## 2020-01-06 PROBLEM — O09.93 SUPERVISION OF HIGH RISK PREGNANCY IN THIRD TRIMESTER: Status: ACTIVE | Noted: 2019-09-17

## 2020-01-06 PROBLEM — O10.013 PRE-EXISTING ESSENTIAL HYPERTENSION DURING PREGNANCY IN THIRD TRIMESTER: Status: ACTIVE | Noted: 2019-08-20

## 2020-01-06 PROBLEM — D50.9 IRON DEFICIENCY ANEMIA OF PREGNANCY: Status: ACTIVE | Noted: 2020-01-06

## 2020-01-06 PROBLEM — O99.019 IRON DEFICIENCY ANEMIA OF PREGNANCY: Status: ACTIVE | Noted: 2020-01-06

## 2020-01-06 LAB
ABO + RH BLD: NORMAL
ABO + RH BLD: NORMAL
ALT SERPL W P-5'-P-CCNC: 14 U/L (ref 0–50)
AST SERPL W P-5'-P-CCNC: 17 U/L (ref 0–45)
BASOPHILS # BLD AUTO: 0 10E9/L (ref 0–0.2)
BASOPHILS NFR BLD AUTO: 0.1 %
BLD GP AB SCN SERPL QL: NORMAL
BLOOD BANK CMNT PATIENT-IMP: NORMAL
CREAT SERPL-MCNC: 0.55 MG/DL (ref 0.52–1.04)
DIFFERENTIAL METHOD BLD: ABNORMAL
EOSINOPHIL NFR BLD AUTO: 0.4 %
ERYTHROCYTE [DISTWIDTH] IN BLOOD BY AUTOMATED COUNT: 12.7 % (ref 10–15)
GFR SERPL CREATININE-BSD FRML MDRD: >90 ML/MIN/{1.73_M2}
HCT VFR BLD AUTO: 33.5 % (ref 35–47)
HGB BLD-MCNC: 10.9 G/DL (ref 11.7–15.7)
IMM GRANULOCYTES # BLD: 0.1 10E9/L (ref 0–0.4)
IMM GRANULOCYTES NFR BLD: 0.7 %
LYMPHOCYTES # BLD AUTO: 2.3 10E9/L (ref 0.8–5.3)
LYMPHOCYTES NFR BLD AUTO: 30.8 %
MCH RBC QN AUTO: 26.4 PG (ref 26.5–33)
MCHC RBC AUTO-ENTMCNC: 32.5 G/DL (ref 31.5–36.5)
MCV RBC AUTO: 81 FL (ref 78–100)
MONOCYTES # BLD AUTO: 0.5 10E9/L (ref 0–1.3)
MONOCYTES NFR BLD AUTO: 6.1 %
NEUTROPHILS # BLD AUTO: 4.6 10E9/L (ref 1.6–8.3)
NEUTROPHILS NFR BLD AUTO: 61.9 %
NRBC # BLD AUTO: 0 10*3/UL
NRBC BLD AUTO-RTO: 0 /100
PLATELET # BLD AUTO: 171 10E9/L (ref 150–450)
RBC # BLD AUTO: 4.13 10E12/L (ref 3.8–5.2)
SPECIMEN EXP DATE BLD: NORMAL
T PALLIDUM AB SER QL: NONREACTIVE
WBC # BLD AUTO: 7.4 10E9/L (ref 4–11)

## 2020-01-06 PROCEDURE — 84450 TRANSFERASE (AST) (SGOT): CPT | Performed by: FAMILY MEDICINE

## 2020-01-06 PROCEDURE — 12000000 ZZH R&B MED SURG/OB

## 2020-01-06 PROCEDURE — 25800030 ZZH RX IP 258 OP 636: Performed by: NURSE ANESTHETIST, CERTIFIED REGISTERED

## 2020-01-06 PROCEDURE — 59400 OBSTETRICAL CARE: CPT | Performed by: FAMILY MEDICINE

## 2020-01-06 PROCEDURE — 86780 TREPONEMA PALLIDUM: CPT | Performed by: FAMILY MEDICINE

## 2020-01-06 PROCEDURE — 25800030 ZZH RX IP 258 OP 636: Performed by: FAMILY MEDICINE

## 2020-01-06 PROCEDURE — 00HU33Z INSERTION OF INFUSION DEVICE INTO SPINAL CANAL, PERCUTANEOUS APPROACH: ICD-10-PCS | Performed by: FAMILY MEDICINE

## 2020-01-06 PROCEDURE — 25000128 H RX IP 250 OP 636: Performed by: NURSE ANESTHETIST, CERTIFIED REGISTERED

## 2020-01-06 PROCEDURE — 10907ZC DRAINAGE OF AMNIOTIC FLUID, THERAPEUTIC FROM PRODUCTS OF CONCEPTION, VIA NATURAL OR ARTIFICIAL OPENING: ICD-10-PCS | Performed by: FAMILY MEDICINE

## 2020-01-06 PROCEDURE — 25000125 ZZHC RX 250

## 2020-01-06 PROCEDURE — 0KQM0ZZ REPAIR PERINEUM MUSCLE, OPEN APPROACH: ICD-10-PCS | Performed by: FAMILY MEDICINE

## 2020-01-06 PROCEDURE — 82565 ASSAY OF CREATININE: CPT | Performed by: FAMILY MEDICINE

## 2020-01-06 PROCEDURE — 84460 ALANINE AMINO (ALT) (SGPT): CPT | Performed by: FAMILY MEDICINE

## 2020-01-06 PROCEDURE — 25000125 ZZHC RX 250: Performed by: NURSE ANESTHETIST, CERTIFIED REGISTERED

## 2020-01-06 PROCEDURE — 40000671 ZZH STATISTIC ANESTHESIA CASE

## 2020-01-06 PROCEDURE — 86901 BLOOD TYPING SEROLOGIC RH(D): CPT | Performed by: FAMILY MEDICINE

## 2020-01-06 PROCEDURE — 85025 COMPLETE CBC W/AUTO DIFF WBC: CPT | Performed by: FAMILY MEDICINE

## 2020-01-06 PROCEDURE — 3E0R3BZ INTRODUCTION OF ANESTHETIC AGENT INTO SPINAL CANAL, PERCUTANEOUS APPROACH: ICD-10-PCS | Performed by: FAMILY MEDICINE

## 2020-01-06 PROCEDURE — 3E033VJ INTRODUCTION OF OTHER HORMONE INTO PERIPHERAL VEIN, PERCUTANEOUS APPROACH: ICD-10-PCS | Performed by: FAMILY MEDICINE

## 2020-01-06 PROCEDURE — 25000132 ZZH RX MED GY IP 250 OP 250 PS 637: Performed by: FAMILY MEDICINE

## 2020-01-06 PROCEDURE — 37000011 ZZH ANESTHESIA WARD SERVICE: Performed by: NURSE ANESTHETIST, CERTIFIED REGISTERED

## 2020-01-06 PROCEDURE — 86900 BLOOD TYPING SEROLOGIC ABO: CPT | Performed by: FAMILY MEDICINE

## 2020-01-06 PROCEDURE — 72200001 ZZH LABOR CARE VAGINAL DELIVERY SINGLE

## 2020-01-06 PROCEDURE — 86850 RBC ANTIBODY SCREEN: CPT | Performed by: FAMILY MEDICINE

## 2020-01-06 PROCEDURE — 25000125 ZZHC RX 250: Performed by: FAMILY MEDICINE

## 2020-01-06 RX ORDER — AMOXICILLIN 250 MG
2 CAPSULE ORAL 2 TIMES DAILY
Status: DISCONTINUED | OUTPATIENT
Start: 2020-01-06 | End: 2020-01-08 | Stop reason: HOSPADM

## 2020-01-06 RX ORDER — MAGNESIUM SULFATE HEPTAHYDRATE 40 MG/ML
2 INJECTION, SOLUTION INTRAVENOUS
Status: DISCONTINUED | OUTPATIENT
Start: 2020-01-06 | End: 2020-01-06

## 2020-01-06 RX ORDER — MAGNESIUM SULFATE HEPTAHYDRATE 40 MG/ML
4 INJECTION, SOLUTION INTRAVENOUS
Status: DISCONTINUED | OUTPATIENT
Start: 2020-01-06 | End: 2020-01-06

## 2020-01-06 RX ORDER — PHENYLEPHRINE HCL IN 0.9% NACL 1 MG/10 ML
100 SYRINGE (ML) INTRAVENOUS EVERY 5 MIN PRN
Status: DISCONTINUED | OUTPATIENT
Start: 2020-01-06 | End: 2020-01-06

## 2020-01-06 RX ORDER — OXYTOCIN/0.9 % SODIUM CHLORIDE 30/500 ML
340 PLASTIC BAG, INJECTION (ML) INTRAVENOUS CONTINUOUS PRN
Status: DISCONTINUED | OUTPATIENT
Start: 2020-01-06 | End: 2020-01-07

## 2020-01-06 RX ORDER — LIDOCAINE 40 MG/G
CREAM TOPICAL
Status: DISCONTINUED | OUTPATIENT
Start: 2020-01-06 | End: 2020-01-06

## 2020-01-06 RX ORDER — OXYTOCIN 10 [USP'U]/ML
10 INJECTION, SOLUTION INTRAMUSCULAR; INTRAVENOUS
Status: DISCONTINUED | OUTPATIENT
Start: 2020-01-06 | End: 2020-01-07

## 2020-01-06 RX ORDER — SODIUM CHLORIDE, SODIUM LACTATE, POTASSIUM CHLORIDE, CALCIUM CHLORIDE 600; 310; 30; 20 MG/100ML; MG/100ML; MG/100ML; MG/100ML
10-125 INJECTION, SOLUTION INTRAVENOUS CONTINUOUS
Status: DISCONTINUED | OUTPATIENT
Start: 2020-01-06 | End: 2020-01-06

## 2020-01-06 RX ORDER — IBUPROFEN 800 MG/1
800 TABLET, FILM COATED ORAL
Status: DISCONTINUED | OUTPATIENT
Start: 2020-01-06 | End: 2020-01-06

## 2020-01-06 RX ORDER — OXYCODONE AND ACETAMINOPHEN 5; 325 MG/1; MG/1
1 TABLET ORAL
Status: DISCONTINUED | OUTPATIENT
Start: 2020-01-06 | End: 2020-01-06

## 2020-01-06 RX ORDER — OXYTOCIN/0.9 % SODIUM CHLORIDE 30/500 ML
100 PLASTIC BAG, INJECTION (ML) INTRAVENOUS CONTINUOUS
Status: DISCONTINUED | OUTPATIENT
Start: 2020-01-06 | End: 2020-01-08 | Stop reason: HOSPADM

## 2020-01-06 RX ORDER — BISACODYL 10 MG
10 SUPPOSITORY, RECTAL RECTAL DAILY PRN
Status: DISCONTINUED | OUTPATIENT
Start: 2020-01-08 | End: 2020-01-08 | Stop reason: HOSPADM

## 2020-01-06 RX ORDER — IBUPROFEN 600 MG/1
600 TABLET, FILM COATED ORAL EVERY 6 HOURS PRN
Status: DISCONTINUED | OUTPATIENT
Start: 2020-01-06 | End: 2020-01-08 | Stop reason: HOSPADM

## 2020-01-06 RX ORDER — ONDANSETRON 2 MG/ML
4 INJECTION INTRAMUSCULAR; INTRAVENOUS EVERY 6 HOURS PRN
Status: DISCONTINUED | OUTPATIENT
Start: 2020-01-06 | End: 2020-01-06

## 2020-01-06 RX ORDER — ACETAMINOPHEN 325 MG/1
650 TABLET ORAL EVERY 4 HOURS PRN
Status: DISCONTINUED | OUTPATIENT
Start: 2020-01-06 | End: 2020-01-08 | Stop reason: HOSPADM

## 2020-01-06 RX ORDER — OXYTOCIN/0.9 % SODIUM CHLORIDE 30/500 ML
100-340 PLASTIC BAG, INJECTION (ML) INTRAVENOUS CONTINUOUS PRN
Status: COMPLETED | OUTPATIENT
Start: 2020-01-06 | End: 2020-01-06

## 2020-01-06 RX ORDER — DIBUCAINE 0.28 G/28G
OINTMENT TOPICAL 3 TIMES DAILY PRN
Status: DISCONTINUED | OUTPATIENT
Start: 2020-01-06 | End: 2020-01-08 | Stop reason: HOSPADM

## 2020-01-06 RX ORDER — OXYTOCIN 10 [USP'U]/ML
10 INJECTION, SOLUTION INTRAMUSCULAR; INTRAVENOUS
Status: DISCONTINUED | OUTPATIENT
Start: 2020-01-06 | End: 2020-01-06

## 2020-01-06 RX ORDER — FENTANYL CITRATE 50 UG/ML
50-100 INJECTION, SOLUTION INTRAMUSCULAR; INTRAVENOUS
Status: DISCONTINUED | OUTPATIENT
Start: 2020-01-06 | End: 2020-01-06

## 2020-01-06 RX ORDER — ACETAMINOPHEN 325 MG/1
650 TABLET ORAL EVERY 4 HOURS PRN
Status: DISCONTINUED | OUTPATIENT
Start: 2020-01-06 | End: 2020-01-06

## 2020-01-06 RX ORDER — NIFEDIPINE 10 MG/1
10 CAPSULE ORAL
Status: DISCONTINUED | OUTPATIENT
Start: 2020-01-06 | End: 2020-01-06

## 2020-01-06 RX ORDER — NALOXONE HYDROCHLORIDE 0.4 MG/ML
.1-.4 INJECTION, SOLUTION INTRAMUSCULAR; INTRAVENOUS; SUBCUTANEOUS
Status: DISCONTINUED | OUTPATIENT
Start: 2020-01-06 | End: 2020-01-06

## 2020-01-06 RX ORDER — BUPIVACAINE HYDROCHLORIDE 2.5 MG/ML
INJECTION, SOLUTION INFILTRATION; PERINEURAL PRN
Status: DISCONTINUED | OUTPATIENT
Start: 2020-01-06 | End: 2020-01-06

## 2020-01-06 RX ORDER — NALBUPHINE HYDROCHLORIDE 10 MG/ML
2.5-5 INJECTION, SOLUTION INTRAMUSCULAR; INTRAVENOUS; SUBCUTANEOUS EVERY 6 HOURS PRN
Status: DISCONTINUED | OUTPATIENT
Start: 2020-01-06 | End: 2020-01-06

## 2020-01-06 RX ORDER — FERROUS GLUCONATE 324(38)MG
324 TABLET ORAL 2 TIMES DAILY WITH MEALS
Status: DISCONTINUED | OUTPATIENT
Start: 2020-01-06 | End: 2020-01-08 | Stop reason: HOSPADM

## 2020-01-06 RX ORDER — METHYLERGONOVINE MALEATE 0.2 MG/ML
200 INJECTION INTRAVENOUS
Status: DISCONTINUED | OUTPATIENT
Start: 2020-01-06 | End: 2020-01-06

## 2020-01-06 RX ORDER — LANOLIN 100 %
OINTMENT (GRAM) TOPICAL
Status: DISCONTINUED | OUTPATIENT
Start: 2020-01-06 | End: 2020-01-08 | Stop reason: HOSPADM

## 2020-01-06 RX ORDER — LORAZEPAM 2 MG/ML
2 INJECTION INTRAMUSCULAR
Status: DISCONTINUED | OUTPATIENT
Start: 2020-01-06 | End: 2020-01-06

## 2020-01-06 RX ORDER — NALOXONE HYDROCHLORIDE 0.4 MG/ML
.1-.4 INJECTION, SOLUTION INTRAMUSCULAR; INTRAVENOUS; SUBCUTANEOUS
Status: DISCONTINUED | OUTPATIENT
Start: 2020-01-06 | End: 2020-01-08 | Stop reason: HOSPADM

## 2020-01-06 RX ORDER — HYDRALAZINE HYDROCHLORIDE 20 MG/ML
10 INJECTION INTRAMUSCULAR; INTRAVENOUS
Status: DISCONTINUED | OUTPATIENT
Start: 2020-01-06 | End: 2020-01-06

## 2020-01-06 RX ORDER — CARBOPROST TROMETHAMINE 250 UG/ML
250 INJECTION, SOLUTION INTRAMUSCULAR
Status: DISCONTINUED | OUTPATIENT
Start: 2020-01-06 | End: 2020-01-06

## 2020-01-06 RX ORDER — PRENATAL VIT/IRON FUM/FOLIC AC 27MG-0.8MG
1 TABLET ORAL DAILY
Status: DISCONTINUED | OUTPATIENT
Start: 2020-01-06 | End: 2020-01-08 | Stop reason: HOSPADM

## 2020-01-06 RX ORDER — EPHEDRINE SULFATE 50 MG/ML
INJECTION, SOLUTION INTRAMUSCULAR; INTRAVENOUS; SUBCUTANEOUS PRN
Status: DISCONTINUED | OUTPATIENT
Start: 2020-01-06 | End: 2020-01-06

## 2020-01-06 RX ORDER — LIDOCAINE HYDROCHLORIDE 10 MG/ML
INJECTION, SOLUTION EPIDURAL; INFILTRATION; INTRACAUDAL; PERINEURAL
Status: DISCONTINUED
Start: 2020-01-06 | End: 2020-01-06 | Stop reason: HOSPADM

## 2020-01-06 RX ORDER — OXYCODONE HYDROCHLORIDE 5 MG/1
5 TABLET ORAL EVERY 4 HOURS PRN
Status: DISCONTINUED | OUTPATIENT
Start: 2020-01-06 | End: 2020-01-08 | Stop reason: HOSPADM

## 2020-01-06 RX ORDER — ONDANSETRON 4 MG/1
4 TABLET, ORALLY DISINTEGRATING ORAL EVERY 6 HOURS PRN
Status: DISCONTINUED | OUTPATIENT
Start: 2020-01-06 | End: 2020-01-06

## 2020-01-06 RX ORDER — HYDRALAZINE HYDROCHLORIDE 20 MG/ML
5 INJECTION INTRAMUSCULAR; INTRAVENOUS
Status: DISCONTINUED | OUTPATIENT
Start: 2020-01-06 | End: 2020-01-06

## 2020-01-06 RX ORDER — DIBUCAINE 0.28 G/28G
OINTMENT TOPICAL 3 TIMES DAILY
Status: DISCONTINUED | OUTPATIENT
Start: 2020-01-06 | End: 2020-01-06

## 2020-01-06 RX ORDER — MAGNESIUM SULFATE HEPTAHYDRATE 500 MG/ML
4 INJECTION, SOLUTION INTRAMUSCULAR; INTRAVENOUS
Status: DISCONTINUED | OUTPATIENT
Start: 2020-01-06 | End: 2020-01-06

## 2020-01-06 RX ORDER — LIDOCAINE HYDROCHLORIDE AND EPINEPHRINE 15; 5 MG/ML; UG/ML
INJECTION, SOLUTION EPIDURAL PRN
Status: DISCONTINUED | OUTPATIENT
Start: 2020-01-06 | End: 2020-01-06

## 2020-01-06 RX ORDER — LIDOCAINE HYDROCHLORIDE 10 MG/ML
INJECTION, SOLUTION EPIDURAL; INFILTRATION; INTRACAUDAL; PERINEURAL
Status: COMPLETED
Start: 2020-01-06 | End: 2020-01-06

## 2020-01-06 RX ORDER — OXYTOCIN/0.9 % SODIUM CHLORIDE 30/500 ML
1-24 PLASTIC BAG, INJECTION (ML) INTRAVENOUS CONTINUOUS
Status: DISCONTINUED | OUTPATIENT
Start: 2020-01-06 | End: 2020-01-06

## 2020-01-06 RX ORDER — MISOPROSTOL 100 UG/1
25 TABLET ORAL EVERY 4 HOURS PRN
Status: DISCONTINUED | OUTPATIENT
Start: 2020-01-06 | End: 2020-01-06

## 2020-01-06 RX ORDER — AMOXICILLIN 250 MG
1 CAPSULE ORAL 2 TIMES DAILY
Status: DISCONTINUED | OUTPATIENT
Start: 2020-01-06 | End: 2020-01-08 | Stop reason: HOSPADM

## 2020-01-06 RX ADMIN — Medication 340 ML/HR: at 16:48

## 2020-01-06 RX ADMIN — SODIUM CHLORIDE, POTASSIUM CHLORIDE, SODIUM LACTATE AND CALCIUM CHLORIDE 125 ML/HR: 600; 310; 30; 20 INJECTION, SOLUTION INTRAVENOUS at 12:04

## 2020-01-06 RX ADMIN — FERROUS GLUCONATE TAB 324 MG (37.5 MG ELEMENTAL IRON) 324 MG: 324 (37.5 FE) TAB at 18:43

## 2020-01-06 RX ADMIN — BUPIVACAINE HYDROCHLORIDE 8 ML: 2.5 INJECTION, SOLUTION INFILTRATION; PERINEURAL at 12:30

## 2020-01-06 RX ADMIN — IBUPROFEN 600 MG: 600 TABLET ORAL at 18:23

## 2020-01-06 RX ADMIN — ACETAMINOPHEN 650 MG: 325 TABLET, FILM COATED ORAL at 20:33

## 2020-01-06 RX ADMIN — BUPIVACAINE HYDROCHLORIDE: 5 INJECTION, SOLUTION EPIDURAL; INTRACAUDAL; PERINEURAL at 12:34

## 2020-01-06 RX ADMIN — PRENATAL VIT W/ FE FUMARATE-FA TAB 27-0.8 MG 1 TABLET: 27-0.8 TAB at 18:43

## 2020-01-06 RX ADMIN — SODIUM CHLORIDE, POTASSIUM CHLORIDE, SODIUM LACTATE AND CALCIUM CHLORIDE 125 ML/HR: 600; 310; 30; 20 INJECTION, SOLUTION INTRAVENOUS at 15:10

## 2020-01-06 RX ADMIN — LIDOCAINE HYDROCHLORIDE 1 ML: 10 INJECTION, SOLUTION EPIDURAL; INFILTRATION; INTRACAUDAL; PERINEURAL at 08:15

## 2020-01-06 RX ADMIN — DIBUCAINE: 1 OINTMENT TOPICAL at 18:21

## 2020-01-06 RX ADMIN — Medication 10 MG: at 12:47

## 2020-01-06 RX ADMIN — Medication 2 MILLI-UNITS/MIN: at 08:35

## 2020-01-06 RX ADMIN — LIDOCAINE HYDROCHLORIDE,EPINEPHRINE BITARTRATE 4 ML: 15; .005 INJECTION, SOLUTION EPIDURAL; INFILTRATION; INTRACAUDAL; PERINEURAL at 12:27

## 2020-01-06 RX ADMIN — SODIUM CHLORIDE, POTASSIUM CHLORIDE, SODIUM LACTATE AND CALCIUM CHLORIDE 125 ML/HR: 600; 310; 30; 20 INJECTION, SOLUTION INTRAVENOUS at 08:34

## 2020-01-06 RX ADMIN — SODIUM CHLORIDE, POTASSIUM CHLORIDE, SODIUM LACTATE AND CALCIUM CHLORIDE 1000 ML: 600; 310; 30; 20 INJECTION, SOLUTION INTRAVENOUS at 11:35

## 2020-01-06 NOTE — PROGRESS NOTES
Doing well per RN.   Resting on side. Had some nausea but no more. Pitocin remains off.   Baby doing well.   Vitals:    01/06/20 1322 01/06/20 1330 01/06/20 1404 01/06/20 1412   BP: 131/80 113/65 120/71 121/74   Pulse:  66 88 78   Resp:  16 16    Temp:  98.9  F (37.2  C)     TempSrc:  Oral     SpO2:           with good variability. Ctx still q 1.5-2 minutes.     RN has not rechecked her since my last exam.     A:  Induction due to history of HTN and Preeclampsia.   P: continue spontaneous labor, will recheck cervix in next 30-60 minutes or sooner prn.   Melanie Bartlett MD

## 2020-01-06 NOTE — PROGRESS NOTES
S: Patient desires Epidural  B: Patient is a  who presented for scheduled induction of labor, She is now 5-6 dialated, FHT's 135  A: LISA Hurtado CRNA called and in room at 1204. Patient and procedure correctly identified/verified with CRNA. Consent signed. LR fluid bolus given. Patient in position for epidural placement. Epidural placed without complications. Test dose/bolus given by CRNA and patient's BP came down to 87/48 with pt c/o nausea. 10 of ephedrine given per CRNA. BP back up to 112/68. Patient rates her pain after procedure as 0/10.  R: Will continue to monitor.

## 2020-01-06 NOTE — PROGRESS NOTES
S:  Admission  B:  Neo is a  @ 39w1d gestation, GBS -, Hep. B -, pregnancy complicated by hypertension. EFW 7.5 lb per MD.  A:  Patient admitted to room 334 for scheduled pitocin induction.  R: Cont to monitor.

## 2020-01-06 NOTE — PROGRESS NOTES
I stopped in earlier at 12:20 but she was getting epidural.   Now doing well, s/p epidural. Not feeling any pain.   No concerns. Pitocin has been stopped due to slight tachysystole.     Vitals:    20 1237 20 1240 20 1247 20 1252   BP: 104/59 (!) 87/48 100/52 112/66   Pulse: 94  83    Resp:       Temp:       TempSrc:       SpO2: 98%  98% 96%      Vitals noted.  Patient alert, oriented, and in no acute distress.    with good variability.  Accels present, no decels.   Ctx now q 3-5 minutes.   Previously q 1.5-2 minutes.     cvx 6/90/-2 with bulging bag, s/p AROM with clear yellow fluid.     A:  Induction of labor due to history of preeclampsia and HTN.     P:  Continue spontaneous labor. Will resume pitocin if needed.   Anticipate  today.  BP has been stable, will monitor, treat prn. She actually had low BP after epidural, 1 dose of ephedrine given.      Melanie Bartlett MD

## 2020-01-06 NOTE — PROGRESS NOTES
S: Induction of labor  B: Patient is a  who presents for scheduled induction of labor @ 39w1d gestation. EFW 7.5 pounds.  A: IV Oxytocin per OB protocol beginning at 2 mU/min. Patient has contractions occas. Pt states not painful. Lasting 60 sec. Pt rates her pain at a 0/10. Understands that she will be on continuous EFM throughout induction. FHR baseline is 140 with mod variability. Patient agrees with plan of care.   R: Will observe for active phase of labor and fetal tolerance.

## 2020-01-06 NOTE — H&P
"  2020    Neo Parra  [id number]      OB Admit History & Physical      Ms. Parra  is here for induction of labor due to chronic HTN during pregnancy.    She has not had any onset of labor.     Patient's last menstrual period was 2019 (approximate).   Her Estimated Date of Delivery: 2020, making her 39w1d  wks.      Estimated body mass index is 32.82 kg/m  as calculated from the following:    Height as of 19: 1.651 m (5' 5\").    Weight as of 19: 89.4 kg (197 lb 3.2 oz).  Her prenatal course has been otherwise uncomplicated. Her BP have been mildly elevated, not requiring antihypertensives, and she is not on BP medication during pregnancy. She does have iron deficiency anemia, treated with iron supplement.     See prenatal for labs.  negative GBBS, Rubella Immune, RH positive.     Estimated fetal weight= 7.5 lbs.      She is a 29 year old   Her OB history:   OB History    Para Term  AB Living   3 2 2 0 0 2   SAB TAB Ectopic Multiple Live Births   0 0 0 0 2      # Outcome Date GA Lbr Miguel Ángel/2nd Weight Sex Delivery Anes PTL Lv   3 Current            2 Term 10/06/18 39w1d 01:48 / 00:10 3.94 kg (8 lb 11 oz) F Vag-Spont EPI N MO      Complications: Preeclampsia/Hypertension      Name: Felicitas      Apgar1: 9  Apgar5: 9   1 Term 16 39w4d 05:25 / 02:11 3.779 kg (8 lb 5.3 oz) F Vag-Spont EPI N MO      Name: Erika      Apgar1: 8  Apgar5: 9      Obstetric Comments   EDC 2020   based on early us.   to Tushar.            Past Medical History:   Diagnosis Date     Gestational hypertension 10/6/2018     Preeclampsia 10/6/2018          Past Surgical History:   Procedure Laterality Date     HC CREATE EARDRUM OPENING,GEN ANESTH  1994    P.E. Tubes x 2         No current outpatient medications on file.       Allergies: Cephalosporins and Tdap [daptacel]      REVIEW OF SYSTEMS:  NEUROLOGIC:  Negative  EYES:  Negative  ENT:  Negative  GI:  Negative  BREAST:  Negative  : "  Negative  GYN:  Negative  CV:  Negative  PULMONARY:  Negative  MUSCULOSKELETAL:  Negative  PSYCH:  Negative        Social History     Socioeconomic History     Marital status:      Spouse name: Tushar     Number of children: 2     Years of education: Not on file     Highest education level: Not on file   Occupational History     Not on file   Social Needs     Financial resource strain: Not on file     Food insecurity:     Worry: Not on file     Inability: Not on file     Transportation needs:     Medical: Not on file     Non-medical: Not on file   Tobacco Use     Smoking status: Never Smoker     Smokeless tobacco: Never Used   Substance and Sexual Activity     Alcohol use: No     Drug use: No     Sexual activity: Yes     Partners: Male     Birth control/protection: None   Lifestyle     Physical activity:     Days per week: Not on file     Minutes per session: Not on file     Stress: Not on file   Relationships     Social connections:     Talks on phone: Not on file     Gets together: Not on file     Attends Synagogue service: Not on file     Active member of club or organization: Not on file     Attends meetings of clubs or organizations: Not on file     Relationship status: Not on file     Intimate partner violence:     Fear of current or ex partner: Not on file     Emotionally abused: Not on file     Physically abused: Not on file     Forced sexual activity: Not on file   Other Topics Concern      Service No     Blood Transfusions No     Caffeine Concern No     Occupational Exposure Yes     Comment: Office work/ordering     Hobby Hazards No     Sleep Concern No     Stress Concern No     Weight Concern No     Special Diet No     Back Care No     Exercise Yes     Bike Helmet Not Asked     Seat Belt Yes     Self-Exams Not Asked     Parent/sibling w/ CABG, MI or angioplasty before 65F 55M? Not Asked   Social History Narrative    2/2018  Lives in [place] with , Tushar and dtrs Rafael.   No  smokers in the household.  Has an indoor cat.  Advised about toxoplasmosis precautions.  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  No concerns about domestic violence.      Family History   Problem Relation Age of Onset     Hypertension Mother      Myocardial Infarction Mother      No Known Problems Father      No Known Problems Brother      No Known Problems Sister      No Known Problems Sister      No Known Problems Brother      No Known Problems Brother      No Known Problems Maternal Grandmother      No Known Problems Maternal Grandfather      No Known Problems Paternal Grandmother      No Known Problems Daughter      No Known Problems Paternal Grandfather      No Known Problems Daughter          Vitals:    with good variability.  She has occasional irregular contractions.    Alert Awake in NAD  HEENT grossly normal  Neck: no lymphadenopathy or thryoidomegaly  Lungs clear to ausculation  Back no spinal or CVAT  Heart RRR without murmur  ABD gravid, cephalic on exam, nontender.   Pelvic:  no fluid noted, no blood noted  Cervix is 4-5 cm / 90 % effaced at -2 station  EXT:  no edema or calf tenderness  Neuro:  Intact.     Assessment:  IUP at 39w1d  With chronic mild HTN and history of preeclampsia in prior pregnancy.     Plan:  Induction with pitocin. Very favorable, will start pitocin now and anticipating epidural, AROM when ready. History of rapid labor, also, anticipate  today.       Melanie Bartlett MD   Dept of Family Medicine  2020

## 2020-01-06 NOTE — PROGRESS NOTES
Comfortable.   Completely dilated, 0 station.   Will labor down and recheck in 30-60 minutes, anticipate pushing soon.   Melanie Bartlett MD

## 2020-01-06 NOTE — PROGRESS NOTES
S: Delivery  B: induced Labor,  @ 39oop6ynve gestation, GBS negative.  A: Patient delivered Vaginal at 1641 with Dr. Bartlett in attendance. Baby delivered and placed on mother's low abdomen for delayed cord clamping where baby was dried and stimulated. After cord clamped and cut, baby was placed skin to skin on mother's chest within 5 minutes following delivery . Apgars 9/9. Placenta was delivered @ 1647 followed by administration of oxytocin. Bonding initiated with mom and baby. Educated mother on importance of exclusive breast feeding, expected feeding readiness cues and encouraged her to observe for feeding cues. Mother informed that breast feeding assistance would be provided. See flowsheet for VS and PP checks. Labor care plan goals met.  R: Expect routine postpartum care. Anticipate first feeding within the hour.

## 2020-01-06 NOTE — PROGRESS NOTES
Doing well, on pitocin at 8 mU  Not really feeling pain, contractions q 3-5 minutes.   .   Vitals:    01/06/20 0756 01/06/20 0835 01/06/20 0901   BP: 123/78 124/81 122/82   Pulse: 90 86 80   Resp: 16 16 16   Temp: 98.4  F (36.9  C)     TempSrc: Oral        Continue pitocin induction.   Melanie Bartlett MD

## 2020-01-06 NOTE — ANESTHESIA PREPROCEDURE EVALUATION
Anesthesia Evaluation       history and physical reviewed .      No history of anesthetic complications          ROS/MED HX    ENT/Pulmonary:  - neg pulmonary ROS     Neurologic:  - neg neurologic ROS     Cardiovascular:  - neg cardiovascular ROS       METS/Exercise Tolerance:     Hematologic:         Musculoskeletal:         GI/Hepatic:     (+) GERD       Renal/Genitourinary:         Endo:         Psychiatric:         Infectious Disease:         Malignancy:         Other:                     Physical Exam  Normal systems: cardiovascular, pulmonary and dental    Airway   Mallampati: II  TM distance: > 3 FB  Neck ROM: full  Mouth opening: > 3 cm    Dental     Cardiovascular       Pulmonary           neg OB ROS                   Anesthesia Plan      History & Physical Review      ASA Status:  2 .  OB Epidural Asa: 2       Plan for Epidural          Postoperative Care      Consents  Anesthetic plan, risks, benefits and alternatives discussed with:  Patient..                          .

## 2020-01-06 NOTE — ANESTHESIA PROCEDURE NOTES
Peripheral nerve/Neuraxial procedure note : epidural catheter  Pre-Procedure    Location: OB      Pre-Anesthestic Checklist: patient identified, IV checked, risks and benefits discussed, informed consent, monitors and equipment checked, pre-op evaluation and at physician/surgeon's request    Timeout  Correct Patient: Yes   Correct Procedure: Yes   Correct Site: Yes   Correct Laterality: N/A   Correct Position: Yes   Site Marked: N/A   .   Procedure Documentation    .    Procedure: epidural catheter, .   Patient Position:sitting Insertion Site:L3-4  (midline approach) Injection technique: LORT air   Local skin infiltrated with 3 mL of 1% lidocaine.      Patient Prep/Sterile Barriers; mask, sterile gloves, povidone-iodine 7.5% surgical scrub, patient draped.  .  Needle: Touhy needle, Ferrara   Needle Gauge: 18.    Needle Length (Inches) 3.5   # of attempts: 2 and # of redirects:  .    Catheter: 20 G . .  Catheter threaded easily  4 cm epidural space.  .   .    Assessment/Narrative  Paresthesias: No.  .  .  Aspiration negative for heme or CSF  . Test dose of 3 mL lidocaine 1.5% w/ 1:200,000 epinephrine at. Test dose negative for signs of intravascular, subdural or intrathecal injection. Sensory Level Left: T8  Sensory Level Right: T8  Comments:  First pass on L 4-5 needle was passing through ligament when ramos was achieved an apparent flow of CSF came through the needle.  CSF was clear.  Then needle was withdrawn and a second attempt was made at L3-4, which RAMOS at epidural space was achieved easily.  Pt tolerated procedure very well and there were no paresthesias.  Discussed at length risks of post dural puncture headache and treatment options if this occurs.  Pt and SO verbalized understanding.  Pain with contractions decreased to zero.

## 2020-01-07 LAB — HGB BLD-MCNC: 9.8 G/DL (ref 11.7–15.7)

## 2020-01-07 PROCEDURE — 85018 HEMOGLOBIN: CPT | Performed by: FAMILY MEDICINE

## 2020-01-07 PROCEDURE — 25000132 ZZH RX MED GY IP 250 OP 250 PS 637: Performed by: FAMILY MEDICINE

## 2020-01-07 PROCEDURE — 36415 COLL VENOUS BLD VENIPUNCTURE: CPT | Performed by: FAMILY MEDICINE

## 2020-01-07 PROCEDURE — 12000000 ZZH R&B MED SURG/OB

## 2020-01-07 RX ADMIN — ACETAMINOPHEN 650 MG: 325 TABLET, FILM COATED ORAL at 05:37

## 2020-01-07 RX ADMIN — SENNOSIDES AND DOCUSATE SODIUM 2 TABLET: 8.6; 5 TABLET ORAL at 00:49

## 2020-01-07 RX ADMIN — ACETAMINOPHEN 650 MG: 325 TABLET, FILM COATED ORAL at 12:11

## 2020-01-07 RX ADMIN — IBUPROFEN 600 MG: 600 TABLET ORAL at 08:39

## 2020-01-07 RX ADMIN — IBUPROFEN 600 MG: 600 TABLET ORAL at 21:06

## 2020-01-07 RX ADMIN — SENNOSIDES AND DOCUSATE SODIUM 2 TABLET: 8.6; 5 TABLET ORAL at 08:38

## 2020-01-07 RX ADMIN — SENNOSIDES AND DOCUSATE SODIUM 2 TABLET: 8.6; 5 TABLET ORAL at 21:06

## 2020-01-07 RX ADMIN — ACETAMINOPHEN 650 MG: 325 TABLET, FILM COATED ORAL at 21:06

## 2020-01-07 RX ADMIN — Medication: at 08:39

## 2020-01-07 RX ADMIN — IBUPROFEN 600 MG: 600 TABLET ORAL at 00:45

## 2020-01-07 RX ADMIN — ACETAMINOPHEN 650 MG: 325 TABLET, FILM COATED ORAL at 00:45

## 2020-01-07 RX ADMIN — FERROUS GLUCONATE TAB 324 MG (37.5 MG ELEMENTAL IRON) 324 MG: 324 (37.5 FE) TAB at 08:38

## 2020-01-07 RX ADMIN — IBUPROFEN 600 MG: 600 TABLET ORAL at 14:49

## 2020-01-07 RX ADMIN — ACETAMINOPHEN 650 MG: 325 TABLET, FILM COATED ORAL at 17:24

## 2020-01-07 RX ADMIN — FERROUS GLUCONATE TAB 324 MG (37.5 MG ELEMENTAL IRON) 324 MG: 324 (37.5 FE) TAB at 17:24

## 2020-01-07 NOTE — PROGRESS NOTES
[facility] Obstetrics Post-Partum Progress Note          Assessment and Plan:    Assessment:   Post-partum day #1  Normal spontaneous vaginal delivery  L&D complications: Second degree perineal laceration and repair.   Mild iron deficiency anemia, stable.       Doing well.  Pain well-controlled.      Plan:   Ambulation encouraged  Breast feeding strategies discussed  Continue iron supplementation  Anticipate discharge tomorrow           Interval History:   Doing well.  Pain is adequately controlled.  No fevers.  No history of foul-smelling vaginal discharge.  Good appetite.  Denies chest pain, shortness of breath, nausea or vomiting.  Vaginal bleeding is similar to a heavy menstrual flow.  Breastfeeding well.          Significant Problems:      Past Medical History:   Diagnosis Date     Gestational hypertension 10/6/2018     Preeclampsia 10/6/2018             Review of Systems:    The patient denies any chest pain, shortness of breath, excessive pain, fever, chills, purulent drainage from the wound, nausea or vomiting.          Medications:     All medications related to the patient's surgery have been reviewed  Current Facility-Administered Medications   Medication     acetaminophen (TYLENOL) tablet 650 mg     [START ON 1/8/2020] bisacodyl (DULCOLAX) Suppository 10 mg     dibucaine (NUPERCAINAL) 1 % ointment     ferrous gluconate (FERGON) tablet 324 mg     ibuprofen (ADVIL/MOTRIN) tablet 600 mg     lanolin ointment     [START ON 1/8/2020] magnesium hydroxide (MILK OF MAGNESIA) suspension 30 mL     naloxone (NARCAN) injection 0.1-0.4 mg     No MMR Needed - Assessment: Patient does not need MMR vaccine     NO Rho (D) immune globulin (RhoGam) needed - mother Rh POSITIVE     No Tdap Needed - Assessment: Patient does not need Tdap vaccine     oxyCODONE (ROXICODONE) tablet 5 mg     oxytocin (PITOCIN) 30 units in 500 mL 0.9% NaCl infusion     prenatal multivitamin w/iron per tablet 1 tablet     senna-docusate  (SENOKOT-S/PERICOLACE) 8.6-50 MG per tablet 1 tablet    Or     senna-docusate (SENOKOT-S/PERICOLACE) 8.6-50 MG per tablet 2 tablet             Physical Exam:     Vitals were reviewed  All vitals stable  Patient Vitals for the past 12 hrs:   BP Temp Temp src Pulse Resp   01/07/20 0839 124/79 98.1  F (36.7  C) Oral 70 18   01/07/20 0045 128/71 98.6  F (37  C) Oral 85 16       Vitals noted.  Patient alert, oriented, and in no acute distress.   Uterine fundus is firm, non-tender and below the level of the umbilicus  Heart is regular rate and rhythm and lungs clear to auscultation  Extremities without significant edema.           Data:     Hemoglobin   Date Value Ref Range Status   01/07/2020 9.8 (L) 11.7 - 15.7 g/dL Final   01/06/2020 10.9 (L) 11.7 - 15.7 g/dL Final   12/10/2019 10.7 (L) 11.7 - 15.7 g/dL Final   10/17/2019 11.1 (L) 11.7 - 15.7 g/dL Final   08/20/2019 12.3 11.7 - 15.7 g/dL Final     No imaging studies have been ordered    This document serves as a record of the services and decisions personally performed and made by Melanie Bartlett MD.  It was created on his behalf by Naheed Wild, a trained medical student and scribe.  The creation of this record is based on the provider's personal observations and the statements of the patient.     Naheed Wild     Pt was personally seen, interviewed and examined by me, chart notes edited and I agree with assessment as documented above.   Melanie Bartlett MD

## 2020-01-07 NOTE — PLAN OF CARE
S: Shift review   B:Neo is a  who delivered vaginally on   A: VSS, patient is independent with mobility, pain well controlled with p.o. pain meds. Handles baby with confidence., BF independently every 2-4 hours without the nipple shield.. Minimal nipple soreness using lanolin. Pt refuses to watch PPD/shaking baby syndrome on IPAD    R: Continue with routine PP care

## 2020-01-07 NOTE — ANESTHESIA POSTPROCEDURE EVALUATION
Patient: Neo Parra    * No procedures listed *    Diagnosis:* No pre-op diagnosis entered *  Diagnosis Additional Information: No value filed.    Anesthesia Type:  Epidural    Note:  Anesthesia Post Evaluation    Patient location during evaluation: Bedside  Patient participation: Able to fully participate in evaluation  Level of consciousness: awake and alert  Pain management: adequate  Airway patency: patent  Cardiovascular status: acceptable and stable  Respiratory status: acceptable and room air  Hydration status: acceptable  PONV: none     Anesthetic complications: None    Comments: Patient was happy with the anesthesia care received and no anesthesia related complications were noted. Pt was resting comfortably in bed and denied pain in back. However, she stated that she felt a slight headache coming on.  She stated that it was mild (2-3/10) but that it was relived with supine position and worsened when sitting up or moving her head. Due to increased chance of spinal headache, I educated patient on increasing her fluid and caffeine intake. I spoke with PING Pantoja about encouraging fluids and to notify anesthesia if headache worsens. I will follow up with the patient again if it is needed. Plan to discharge home tomorrow.        Last vitals:  Vitals:    01/06/20 1900 01/07/20 0045 01/07/20 0839   BP: 126/58 128/71 124/79   Pulse:  85 70   Resp:  16 18   Temp:  98.6  F (37  C) 98.1  F (36.7  C)   SpO2:            Electronically Signed By: KHANG Cruz CRNA  January 7, 2020  3:17 PM

## 2020-01-07 NOTE — L&D DELIVERY NOTE
Delivery Summary    Neo Parra MRN# [id number]   Age: 29 year old YOB: 1990     ASSESSMENT & PLAN:   Neo Parra is a 29 year old  female who presents at 39w1d weeks with induction of labor due to HTN/history of preeclampsia.      Her prenatal course was otherwise uncomplicated.  She received her prenatal care here in [place].      Her routine prenatal labs were all normal as follows:   A positive  antibody screen negative  Rubella immune   Trepab negative  HepBsAg negative  HIV negative  She passed her 1 hour GTT.   Her GBS is negative    On arrival at the birthplace her cervical exam is as follows:    4-5/90%/-2    She had pitocin induction of labor initially, but the pitocin was stopped due to tachysystole and she continued the remainder of her labor spontaneously.   She received epidural with good relief of her labor pain.   She had AROM at 12:56.    She labored well and was completely dilated at 15:00.  First stage of labor was 2 hrs 4 min.     She was allowed to labor down.   She then pushed 1 push very well with  viable female infant over an intact perineum at 16:41.  The infant was then laid on mother's abdomen, received drying and tactile stimulation.  The cord was delayed clamped x 2 and cut by the FOB who was in attendance.  There was spontaneous cry and APGARS at 1 and 5 minutes were 9 and 9 respectively.   Second stage of labor was 1 hrs and 41 minutes.     The placenta delivered spontaneously, intact with a 3VC at 1647.  Pitocin was given IV after delivery of the placenta.  Third stage of labor was 6 minutes.     Examination of the vaginal walls and perineum revealed a small 2nd degree posterior perineal laceration.  Fundal massage proved that the uterine fundus was firm.  EBL was 300 ml.      Because the laceration extended superficially down the skin toward the rectum, I recommended closure. It was repaired in the usual sterile running fashion using 3-0 chromic  suture.  Excellent hemostasis was obtained.  Repeat examination of the vaginal walls and perineum revealed no further lacerations and no ongoing bleeding.  Digital rectal exam was normal.      A:   viable female infant at 39 1/7 weeks gestation with APGARS of 9 and 9 weighing 8 lbs 11 oz.    P:  Mom, dad and baby are doing well after delivery.  Mom plans to breastfeed baby and baby will be rooming in with mom. I anticipate they will both have a normal course.      Melanie Bartlett MD            Labor Event Times    Labor onset date:  20 Onset time:  12:56 PM   Dilation complete date:  20 Complete time:   3:00 PM   Start pushing date/time:  2020 1640      Labor Length    1st Stage (hrs):  2 (min):  4   2nd Stage (hrs):  1 (min):  41   3rd Stage (hrs):  0 (min):  6      Labor Events     labor?:  No   steroids:  None  Labor Type:  Induction  Predominate monitoring during 1st stage:  continuous electronic fetal monitoring     Antibiotics received during labor?:  No     Rupture identifier:  Sac 1  Rupture date/time: 20 1256   Rupture type:  Artificial Rupture of Membranes  Fluid color:  Clear  Fluid odor:  Normal     Induction:  Oxytocin  Induction date/time:     Cervical ripening date/time:     Indications for induction:  Hypertension     1:1 continuous labor support provided by?:  RN Labor partogram used?:  no      Delivery/Placenta Date and Time    Delivery Date:  20 Delivery Time:   4:41 PM   Placenta Date/Time:  2020  4:47 PM     Vaginal Counts     Initial count performed by 2 team members:   Two Team Members   melanie Bartlett Jill       Needles Suture [employer] Sponges Instruments   Initial counts 2 0 5    Added to count 0 1 0    Final counts 2 1 5    Placed during labor Accounted for at the end of labor   No NA   No NA   No NA    Final count performed by 2 team members:   Two Team Members   Melanie Bartlett Jill      Final count correct?:   "Yes         Apgars    Living status:  Living   1 Minute 5 Minute 10 Minute 15 Minute 20 Minute   Skin color: 1  1       Heart rate: 2  2       Reflex irritability: 2  2       Muscle tone: 2  2       Respiratory effort: 2  2       Total: 9  9       Apgars assigned by:  MIAN KERN RN     Cord    Vessels:  3 Vessels Complications:  None   Cord Blood Disposition:  Lab Gases Sent?:  No       Resuscitation    Methods:  None          [place] Measurements    Weight:  8 lb 11.2 oz Length:  1' 9.75\"   Head circumference:  37.5 cm Chest circumference:  33.7 cm      Skin to Skin and Feeding Plan    Skin to skin initiation date/time: 1841    Skin to skin with:  Mother  Skin to skin end date/time:     How do you plan to feed your baby:  Breastfeeding     Labor Events and Shoulder Dystocia    Fetal Tracing Prior to Delivery:  Category 1  Shoulder dystocia present?:  Neg             Delivery (Maternal) (Provider to Complete) (381534)    Episiotomy:  None  Perineal lacerations:  2nd Repaired?:  Yes      Blood Loss  Mother: Neo Parra #[id number]   Start of Mother's Information    IO Blood Loss  20 1256 - 20 2139    Delivery QBL (mL) Hospital Encounter 1295 mL    Total  1295 mL         End of Mother's Information  Mother: Neo Parra #[id number]         Delivery - Provider to Complete (328981)    Delivering clinician:  Melanie Bartlett MD  Attempted Delivery Types (Choose all that apply):  Spontaneous Vaginal Delivery  Delivery Type (Choose the 1 that will go to the Birth History):  Vaginal, Spontaneous   Other personnel:   Provider Role   Melanie Bartlett MD Kilby-Howard, Yvonne, RN Smart, Jill, RN Kaluzniak, Heather Medical Student         Placenta    Delayed Cord Clamping:  Done  Date/Time:  2020  4:47 PM  Removal:  Spontaneous  Disposition:  Hospital disposal     Anesthesia    Method:  Epidural  Cervical dilation at placement:  4-7          Presentation and " Position    Presentation:  Vertex  Position:  Left Occiput Anterior           Melanie Bartlett MD

## 2020-01-07 NOTE — PROGRESS NOTES
S: Transfer to postpartum  B: Vaginal birth @ 1641, 2nd tear, with repair, breast feeding    A: Mother and baby transferred to postpartum unit at 354 via ambulation after completion of immediate recovery period. Patient oriented to room. Mother and baby bonding well and in satisfactory condition upon transfer.  R: Anticipate routine postpartum care.

## 2020-01-08 VITALS
HEART RATE: 71 BPM | OXYGEN SATURATION: 97 % | SYSTOLIC BLOOD PRESSURE: 131 MMHG | DIASTOLIC BLOOD PRESSURE: 76 MMHG | TEMPERATURE: 98.2 F | RESPIRATION RATE: 18 BRPM

## 2020-01-08 PROCEDURE — 25000132 ZZH RX MED GY IP 250 OP 250 PS 637: Performed by: FAMILY MEDICINE

## 2020-01-08 RX ORDER — DIBUCAINE 0.28 G/28G
OINTMENT TOPICAL
Qty: 60 G | Refills: 1 | Status: SHIPPED | OUTPATIENT
Start: 2020-01-08 | End: 2021-09-14

## 2020-01-08 RX ORDER — IBUPROFEN 600 MG/1
600 TABLET, FILM COATED ORAL EVERY 6 HOURS PRN
Qty: 60 TABLET | Refills: 0 | Status: SHIPPED | OUTPATIENT
Start: 2020-01-08 | End: 2021-09-14

## 2020-01-08 RX ORDER — LANOLIN 100 %
OINTMENT (GRAM) TOPICAL
Qty: 40 G | Refills: 0 | Status: SHIPPED | OUTPATIENT
Start: 2020-01-08 | End: 2021-09-14

## 2020-01-08 RX ORDER — ACETAMINOPHEN 325 MG/1
650 TABLET ORAL EVERY 4 HOURS PRN
COMMUNITY
Start: 2020-01-08 | End: 2021-09-14

## 2020-01-08 RX ORDER — AMOXICILLIN 250 MG
1 CAPSULE ORAL 2 TIMES DAILY PRN
Qty: 60 TABLET | Refills: 0 | Status: SHIPPED | OUTPATIENT
Start: 2020-01-08 | End: 2021-09-14

## 2020-01-08 RX ADMIN — IBUPROFEN 600 MG: 600 TABLET ORAL at 03:29

## 2020-01-08 RX ADMIN — PRENATAL VIT W/ FE FUMARATE-FA TAB 27-0.8 MG 1 TABLET: 27-0.8 TAB at 09:03

## 2020-01-08 RX ADMIN — ACETAMINOPHEN 650 MG: 325 TABLET, FILM COATED ORAL at 12:27

## 2020-01-08 RX ADMIN — FERROUS GLUCONATE TAB 324 MG (37.5 MG ELEMENTAL IRON) 324 MG: 324 (37.5 FE) TAB at 09:03

## 2020-01-08 RX ADMIN — SENNOSIDES AND DOCUSATE SODIUM 1 TABLET: 8.6; 5 TABLET ORAL at 09:03

## 2020-01-08 RX ADMIN — ACETAMINOPHEN 650 MG: 325 TABLET, FILM COATED ORAL at 06:38

## 2020-01-08 RX ADMIN — IBUPROFEN 600 MG: 600 TABLET ORAL at 09:03

## 2020-01-08 RX ADMIN — ACETAMINOPHEN 650 MG: 325 TABLET, FILM COATED ORAL at 00:56

## 2020-01-08 RX ADMIN — IBUPROFEN 600 MG: 600 TABLET ORAL at 15:55

## 2020-01-08 NOTE — PLAN OF CARE
S: Discharge  to home    B: Patient had a Vaginal delivery with complications of spinal headache.Baby girl Baby's name Rylee, breast:  and both breast and bottle(Similac Advance)}:1/2 oz   as needed depending on BF. Mother pumping every 2 hours and supplementing  with pumped breast milk.. Support person's name Tushar.     A: VSS,, hgb 9.8 at 24 hours, Scant vag flow. 2 degree tear with repair using johnna bottle, tucks and dibucaine ointment prn. BF with difficulty due to infant's lethargy(SB 13.8)patient had a wet tap with her epidural insertion, complaining headache and neck pain rates pain 3/10. Pt tolerating pain using ibuprofen and tylenol prn. Anesthesia here discussed options, Pt will return to ED and may need treatment of spinal headache prn.    R: All Discharge instructions reviewed and questions answered.  Belongings gathered and returned to the patient. Agreed to follow up in 6 weeks or sooner with any question or concerns.     Nursing Discharge Checklist:    Pneumovax screened and given, if appropriate: N/A  Influenza vaccine screened and given, if appropriate: N/A  Staples removed (): N/A  Breast milk returned: N/A  Hydrogel pads sent home:N/A  Birth Certificate Done: YES

## 2020-01-08 NOTE — DISCHARGE SUMMARY
[facility] Discharge Summary    Neo Parra MRN# [id number]   Age: 29 year old YOB: 1990     Date of Admission:  2020  Date of Discharge::  2020  Admitting Physician:  Melanie Bartlett MD  Discharge Physician:  Melanie Bartlett MD     Home clinic: [facility]          Admission Diagnoses:   Supervision of high risk pregnancy in third trimester  History of preeclampsia  Pre-gestational hypertension  Iron deficiency anemia          Discharge Diagnosis:   Normal spontaneous vaginal delivery  Intrauterine pregnancy at 39 1/7 weeks gestation  Iron deficiency anemia  Lactation          Procedures:   Procedure(s): Induction of labor, vaginal delivery  Repair of 2nd degree perineal laceration              Medications Prior to Admission:     Medications Prior to Admission   Medication Sig Dispense Refill Last Dose     ferrous gluconate (FERGON) 324 (38 Fe) MG tablet Take 1 tablet (324 mg) by mouth 2 times daily (with meals) 180 tablet 1 2020 at 0800     Prenatal Vit-Fe Fumarate-FA (PRENATAL MULTIVITAMIN W/IRON) 27-0.8 MG tablet Take 1 tablet by mouth daily   2020 at 0800     order for DME Equipment being ordered: blood pressure cuff, adult regular 1 Device 0 Taking     [DISCONTINUED] aspirin (ASA) 81 MG chewable tablet Take 1 tablet (81 mg) by mouth daily 93 tablet 3 2020 at 0800             Discharge Medications:     Current Discharge Medication List      START taking these medications    Details   acetaminophen (TYLENOL) 325 MG tablet Take 2 tablets (650 mg) by mouth every 4 hours as needed for mild pain or fever (greater than or equal to 38  C /100.4  F (oral) or 38.5  C/ 101.4  F (core).)    Associated Diagnoses:  (normal spontaneous vaginal delivery)      dibucaine (NUPERCAINAL) 1 % external ointment Apply to perineum three times daily with TUCKS pads as needed for pain  Qty: 60 g, Refills: 1    Associated Diagnoses:  (normal  spontaneous vaginal delivery)      ibuprofen (ADVIL/MOTRIN) 600 MG tablet Take 1 tablet (600 mg) by mouth every 6 hours as needed for other (cramping)  Qty: 60 tablet, Refills: 0    Associated Diagnoses:  (normal spontaneous vaginal delivery)      lanolin ointment Use as needed for dry, cracked or sore nipples. No need to wash off.  Qty: 40 g, Refills: 0    Associated Diagnoses: Postpartum care and examination of lactating mother      senna-docusate (SENOKOT-S/PERICOLACE) 8.6-50 MG tablet Take 1 tablet by mouth 2 times daily as needed for constipation  Qty: 60 tablet, Refills: 0    Associated Diagnoses:  (normal spontaneous vaginal delivery)         CONTINUE these medications which have NOT CHANGED    Details   ferrous gluconate (FERGON) 324 (38 Fe) MG tablet Take 1 tablet (324 mg) by mouth 2 times daily (with meals)  Qty: 180 tablet, Refills: 1    Associated Diagnoses: Iron deficiency      Prenatal Vit-Fe Fumarate-FA (PRENATAL MULTIVITAMIN W/IRON) 27-0.8 MG tablet Take 1 tablet by mouth daily      order for DME Equipment being ordered: blood pressure cuff, adult regular  Qty: 1 Device, Refills: 0    Associated Diagnoses: Elevated blood pressure reading without diagnosis of hypertension         STOP taking these medications       aspirin (ASA) 81 MG chewable tablet Comments:   Reason for Stopping:                     Consultations:   No consultations were requested during this admission          Brief History of Labor:   Patient was admitted for induction of labor due to a history of preeclampsia and pre-existing HTN.  She went on to deliver a healthy baby girl without complications.  Please see delivery summary for full details.              Hospital Course:   The patient's hospital course was unremarkable.  On discharge, her pain was well controlled. She had mild headache that was tolerable, and no elevated blood pressure readings.  Vaginal bleeding is similar to peak menstrual flow.  Voiding without  difficulty.  Ambulating well and tolerating a normal diet.  No fever.  Breastfeeding fairly well.  Infant has jaundice but is otherwise doing well. She was discharged on post-partum day #2.    On the day of discharge her exam is as follows:    Vitals:    01/07/20 0839 01/07/20 1550 01/08/20 0030 01/08/20 0750   BP: 124/79 122/74 133/74 131/76   Pulse: 70 64 65 71   Resp: 18 16 18 18   Temp: 98.1  F (36.7  C) 97.3  F (36.3  C) 98  F (36.7  C) 98.2  F (36.8  C)   TempSrc: Oral Oral Oral Oral   SpO2:   97%       Vitals noted.  Patient alert, oriented, and in no acute distress. CV:  RRR without murmur. Respiratory:  Lungs clear to auscultation bilaterally. Abdomen:  Soft, tender with palpation of the uterine fundus which is firm and below the level of the umbilicus, nondistended with good bowel sounds and no masses or hepatosplenomegaly.  Extremities warm and dry without significant edema.       Post-partum hemoglobin:   Hemoglobin   Date Value Ref Range Status   01/07/2020 9.8 (L) 11.7 - 15.7 g/dL Final             Discharge Instructions and Follow-Up:   Discharge diet: Regular   Discharge activity: No sex for 6 week(s)   Discharge follow-up: Follow up with Dr. Bartlett in 6 weeks   Wound care: Tub soaks daily to twice daily as able           Discharge Disposition:   Discharged to home      Attestation:  I have reviewed today's vital signs, notes, medications, labs and imaging.    Melanie Bartlett MD

## 2020-01-08 NOTE — DISCHARGE INSTRUCTIONS
[facility] Discharge Instructions     Discharge disposition:  Discharged to home       Diet:  Regular       Activity No sex and nothing in vagina for 6 week(s)       Follow-up: Follow up with Dr. Bartlett in 6 weeks for routine postpartum visit       Additional instructions: The birthplace staff is available 24 hours 7 days for questions about you or your baby.  Please don't hesitate to call with any concerns.        Additional Patient Information:  Enjoy beautiful Rylee!    Postpartum Vaginal Delivery Instructions    Activity       Ask family and friends for help when you need it.    Do not place anything in your vagina for 6 weeks.    You are not restricted on other activities, but take it easy for a few weeks to allow your body to recover from delivery.  You are able to do any activities you feel up to that point.    No driving until you have stopped taking your pain medications (usually two weeks after delivery).     Call your health care provider if you have any of these symptoms:       Increased pain, swelling, redness, or fluid around your stiches from an episiotomy or perineal tear.    A fever above 100.4 F (38 C) with or without chills when placing a thermometer under your tongue.    You soak a sanitary pad with blood within 1 hour, or you see blood clots larger than a golf ball.    Bleeding that lasts more than 6 weeks.    Vaginal discharge that smells bad.    Severe pain, cramping or tenderness in your lower belly area.    A need to urinate more frequently (use the toilet more often), more urgently (use the toilet very quickly), or it burns when you urinate.    Nausea and vomiting.    Redness, swelling or pain around a vein in your leg.    Problems breastfeeding or a red or painful area on your breast.    Chest pain and cough or are gasping for air.    Problems coping with sadness, anxiety, or depression.  If you have any concerns about hurting yourself or the baby, call your  provider immediately.     You have questions or concerns after you return home.     Keep your hands clean:  Always wash your hands before touching your perineal area and stitches.  This helps reduce your risk of infection.  If your hands aren't dirty, you may use an alcohol hand-rub to clean your hands. Keep your nails clean and short.

## 2020-01-08 NOTE — PLAN OF CARE
Vss. Tylenol and Ibuprofen given for lower abdominal cramping with adequate pain control. Pt is up independently in room. Breastfeeding assistance given and pt then able to do independently. FF @ U, light lochia flow. Bonding well with infant. Will continue with plan of care.

## 2020-01-08 NOTE — PROGRESS NOTES
0903 S: BF/ pt frustrations B: BF well yesterday and today NB BF poorly( infant jaundiced.A: Assisted NB to BF latches but did not suck, Pt became very tearful, verbalized desire to give NB some formula and to start pumping.Discussed risks with supplementing formula. Pt too upset to attempt to BF, Pt desires to supplement now. R: Will encourage mother to pump every 2-3 hours in addition to BF every 2-3.

## 2020-01-09 ENCOUNTER — ANESTHESIA EVENT (OUTPATIENT)
Dept: EMERGENCY MEDICINE | Facility: CLINIC | Age: 30
End: 2020-01-09
Payer: COMMERCIAL

## 2020-01-09 ENCOUNTER — ANESTHESIA (OUTPATIENT)
Dept: EMERGENCY MEDICINE | Facility: CLINIC | Age: 30
End: 2020-01-09
Payer: COMMERCIAL

## 2020-01-09 ENCOUNTER — HOSPITAL ENCOUNTER (EMERGENCY)
Facility: CLINIC | Age: 30
Discharge: HOME | End: 2020-01-09
Attending: FAMILY MEDICINE | Admitting: FAMILY MEDICINE
Payer: COMMERCIAL

## 2020-01-09 VITALS
DIASTOLIC BLOOD PRESSURE: 90 MMHG | HEART RATE: 63 BPM | TEMPERATURE: 99.2 F | WEIGHT: 180 LBS | RESPIRATION RATE: 16 BRPM | SYSTOLIC BLOOD PRESSURE: 128 MMHG | OXYGEN SATURATION: 99 % | HEIGHT: 65 IN | BODY MASS INDEX: 29.99 KG/M2

## 2020-01-09 PROCEDURE — 99284 EMERGENCY DEPT VISIT MOD MDM: CPT | Mod: Z6 | Performed by: FAMILY MEDICINE

## 2020-01-09 PROCEDURE — 25800030 ZZH RX IP 258 OP 636: Performed by: NURSE ANESTHETIST, CERTIFIED REGISTERED

## 2020-01-09 PROCEDURE — 62273 INJECT EPIDURAL PATCH: CPT | Performed by: NURSE ANESTHETIST, CERTIFIED REGISTERED

## 2020-01-09 PROCEDURE — 96361 HYDRATE IV INFUSION ADD-ON: CPT | Performed by: FAMILY MEDICINE

## 2020-01-09 PROCEDURE — 25000128 H RX IP 250 OP 636: Performed by: NURSE ANESTHETIST, CERTIFIED REGISTERED

## 2020-01-09 PROCEDURE — 96374 THER/PROPH/DIAG INJ IV PUSH: CPT | Mod: 59 | Performed by: FAMILY MEDICINE

## 2020-01-09 PROCEDURE — 40000671 ZZH STATISTIC ANESTHESIA CASE

## 2020-01-09 PROCEDURE — 99283 EMERGENCY DEPT VISIT LOW MDM: CPT | Mod: 25 | Performed by: FAMILY MEDICINE

## 2020-01-09 RX ORDER — KETOROLAC TROMETHAMINE 30 MG/ML
30 INJECTION, SOLUTION INTRAMUSCULAR; INTRAVENOUS ONCE
Status: COMPLETED | OUTPATIENT
Start: 2020-01-09 | End: 2020-01-09

## 2020-01-09 RX ORDER — SODIUM CHLORIDE 9 MG/ML
INJECTION, SOLUTION INTRAVENOUS ONCE
Status: COMPLETED | OUTPATIENT
Start: 2020-01-09 | End: 2020-01-09

## 2020-01-09 RX ADMIN — KETOROLAC TROMETHAMINE 30 MG: 30 INJECTION, SOLUTION INTRAMUSCULAR at 13:19

## 2020-01-09 RX ADMIN — SODIUM CHLORIDE 1000 ML: 9 INJECTION, SOLUTION INTRAVENOUS at 13:01

## 2020-01-09 ASSESSMENT — MIFFLIN-ST. JEOR: SCORE: 1552.35

## 2020-01-09 NOTE — ED PROVIDER NOTES
"  History     Chief Complaint   Patient presents with     post partum spinal zimmer     HPI  Neo Parra is a 29 year old female who presents with a headache.  She is postpartum.  Had epidural anesthesia for labor.  When she left the hospital she noted a positional headache.  Positional headache is now become much worse.  She notified anesthesia recommend she brought in for a spinal blood patch.  She has had no fever or chills.  She states she otherwise is feeling well.  No neurologic symptoms associate with a headache.  When she is lying supine headache is 1/10 intensity.  When upright headache escalates to 8/10 intensity.    Allergies:  Allergies   Allergen Reactions     Cephalosporins Hives     rash reaction from SUPRAX     Tdap [Daptacel] Other (See Comments)     Tingling/numbness of the ipsilateral arm and mouth felt \"weird\" inside, lips tingling. No swelling or SOB.        Problem List:    Patient Active Problem List    Diagnosis Date Noted     Iron deficiency anemia of pregnancy 2020     Priority: Medium     Supervision of high risk pregnancy in third trimester 2019     Priority: Medium     Pre-existing essential hypertension during pregnancy in third trimester 2019     Priority: Medium      (normal spontaneous vaginal delivery) 10/07/2018     Priority: Medium     Postpartum care and examination of lactating mother 10/07/2018     Priority: Medium     Migraine with aura and without status migrainosus, not intractable 2018     Priority: Medium     CARDIOVASCULAR SCREENING; LDL GOAL LESS THAN 160 2011     Priority: Medium        Past Medical History:    Past Medical History:   Diagnosis Date     Gestational hypertension 10/6/2018     Preeclampsia 10/6/2018       Past Surgical History:    Past Surgical History:   Procedure Laterality Date     HC CREATE EARDRUM OPENING,GEN ANESTH  1994    P.E. Tubes x 2       Family History:    Family History   Problem Relation Age of Onset     " "Hypertension Mother      Myocardial Infarction Mother      No Known Problems Father      No Known Problems Brother      No Known Problems Sister      No Known Problems Sister      No Known Problems Brother      No Known Problems Brother      No Known Problems Maternal Grandmother      No Known Problems Maternal Grandfather      No Known Problems Paternal Grandmother      No Known Problems Daughter      No Known Problems Paternal Grandfather      No Known Problems Daughter        Social History:  Marital Status:   [2]  Social History     Tobacco Use     Smoking status: Never Smoker     Smokeless tobacco: Never Used   Substance Use Topics     Alcohol use: No     Drug use: No        Medications:    acetaminophen (TYLENOL) 325 MG tablet  dibucaine (NUPERCAINAL) 1 % external ointment  ferrous gluconate (FERGON) 324 (38 Fe) MG tablet  ibuprofen (ADVIL/MOTRIN) 600 MG tablet  lanolin ointment  order for DME  Prenatal Vit-Fe Fumarate-FA (PRENATAL MULTIVITAMIN W/IRON) 27-0.8 MG tablet  senna-docusate (SENOKOT-S/PERICOLACE) 8.6-50 MG tablet          Review of Systems   All other systems reviewed and are negative.      Physical Exam   BP: 126/85  Pulse: 69  Temp: 99.2  F (37.3  C)  Resp: 16  Height: 165.1 cm (5' 5\")  Weight: 81.6 kg (180 lb)  SpO2: 99 %      Physical Exam  Vitals signs and nursing note reviewed.   Constitutional:       General: She is not in acute distress.  HENT:      Head: Normocephalic.      Nose: No congestion.   Eyes:      Pupils: Pupils are equal, round, and reactive to light.   Cardiovascular:      Rate and Rhythm: Normal rate.   Pulmonary:      Effort: Pulmonary effort is normal.   Skin:     General: Skin is warm and dry.      Capillary Refill: Capillary refill takes less than 2 seconds.   Neurological:      General: No focal deficit present.      Mental Status: She is alert.         ED Course        Procedures                Assessments & Plan (with Medical Decision Making)  Postpartum day 3.  " Expressing a positional/spinal headache following epidural.   Proceed with spinal blood patch.  Anesthesia notified.  Discharge after spinal patch and cleared per anesthesia     I have reviewed the nursing notes.    I have reviewed the findings, diagnosis, plan and need for follow up with the patient.      New Prescriptions    No medications on file       Final diagnoses:   Spinal headache complicating labor and delivery, delivered, postpartum       1/9/2020   [facility] EMERGENCY DEPARTMENT     Az Downing,   01/09/20 1428

## 2020-01-09 NOTE — ANESTHESIA PREPROCEDURE EVALUATION
Anesthesia Pre-Procedure Evaluation    Patient: Neo Parra   MRN: [id number] : 1990          Preoperative Diagnosis: * No pre-op diagnosis entered *    * No procedures listed *    Past Medical History:   Diagnosis Date     Gestational hypertension 10/6/2018     Preeclampsia 10/6/2018     Past Surgical History:   Procedure Laterality Date     HC CREATE EARDRUM OPENING,GEN ANESTH  1994    P.E. Tubes x 2       Anesthesia Evaluation     .      No history of anesthetic complications          ROS/MED HX    ENT/Pulmonary:  - neg pulmonary ROS     Neurologic:  - neg neurologic ROS     Cardiovascular:  - neg cardiovascular ROS   (+) hypertension-range: PIH, ---. : . . . :. .       METS/Exercise Tolerance:     Hematologic:         Musculoskeletal:         GI/Hepatic:     (+) GERD       Renal/Genitourinary:         Endo:         Psychiatric:  - neg psychiatric ROS       Infectious Disease:         Malignancy:         Other:                          Physical Exam  Normal systems: cardiovascular, pulmonary and dental    Airway   Mallampati: II  TM distance: > 3 FB  Neck ROM: full    Dental     Cardiovascular   Rhythm and rate: regular and normal  (-) no murmur    Pulmonary    breath sounds clear to auscultation            Lab Results   Component Value Date    WBC 7.4 2020    HGB 9.8 (L) 2020    HCT 33.5 (L) 2020     2020     12/10/2019    POTASSIUM 3.4 12/10/2019    CHLORIDE 108 12/10/2019    CO2 25 12/10/2019    BUN 6 (L) 12/10/2019    CR 0.55 2020    GLC 74 12/10/2019    MIGUEL 8.5 12/10/2019    ALBUMIN 3.1 (L) 10/05/2018    PROTTOTAL 7.1 10/05/2018    ALT 14 2020    AST 17 2020    ALKPHOS 147 12/10/2019    BILITOTAL 0.4 10/05/2018    TSH 2.51 2019    HCG Positive (A) 2019       Preop Vitals  BP Readings from Last 3 Encounters:   20 126/85   20 131/76   19 126/74    Pulse Readings from Last 3 Encounters:   20 69   20 71  "  12/30/19 78      Resp Readings from Last 3 Encounters:   01/09/20 16   01/08/20 18   12/30/19 14    SpO2 Readings from Last 3 Encounters:   01/09/20 99%   01/08/20 97%   12/30/19 100%      Temp Readings from Last 1 Encounters:   01/09/20 99.2  F (37.3  C) (Oral)    Ht Readings from Last 1 Encounters:   01/09/20 1.651 m (5' 5\")      Wt Readings from Last 1 Encounters:   01/09/20 81.6 kg (180 lb)    Estimated body mass index is 29.95 kg/m  as calculated from the following:    Height as of this encounter: 1.651 m (5' 5\").    Weight as of this encounter: 81.6 kg (180 lb).       Anesthesia Plan      History & Physical Review  History and physical reviewed and following examination; no interval change.    ASA Status:  2 .        Plan for Epidural (Epidural blood patch)          Postoperative Care      Consents  Anesthetic plan, risks, benefits and alternatives discussed with:  Patient..                 Suman Malin, APRN CRNA  "

## 2020-01-09 NOTE — ED AVS SNAPSHOT
[facility] Emergency Department  [address] [place] [address]  Phone:  [phone]  Fax:  [phone]                                    Neo Parra   MRN: [id number]    Department:  [facility] Emergency Department   Date of Visit:  1/9/2020           After Visit Summary Signature Page    I have received my discharge instructions, and my questions have been answered. I have discussed any challenges I see with this plan with the nurse or doctor.    ..........................................................................................................................................  Patient/Patient Representative Signature      ..........................................................................................................................................  Patient Representative Print Name and Relationship to Patient    ..................................................               ................................................  Date                                   Time    ..........................................................................................................................................  Reviewed by Signature/Title    ...................................................              ..............................................  Date                                               Time          22EPIC Rev 08/18

## 2020-01-09 NOTE — ANESTHESIA PROCEDURE NOTES
Peripheral nerve/Neuraxial procedure note : epidural blood patch  Pre-Procedure  Performed by  Suman Malin APRN CRNA   Location: ED      Pre-Anesthestic Checklist: patient identified, IV checked, risks and benefits discussed, informed consent, monitors and equipment checked and pre-op evaluation    Timeout  Correct Patient: Yes   Correct Procedure: Yes   Correct Site: Yes   Correct Laterality: N/A   Correct Position: Yes   Site Marked: N/A   .   Procedure Documentation    Diagnosis:Post sural puncture head ache.    Procedure: epidural blood patch, .   Patient Position:sitting Insertion Site:L3-4  (midline approach) Injection technique: LORT air   Local skin infiltrated with 1 mL of 1% lidocaine.  RAMOS at 6 cm    Patient Prep/Sterile Barriers; mask, sterile gloves, povidone-iodine 7.5% surgical scrub.  .  Needle: Touhy needle, Ferrara   Needle Gauge: 18.    Needle Length (Inches) 3.5   # of attempts: 1 and # of redirects: : 0. .    . .   .  .   Blood patch amount 20 mL.    Assessment/Narrative  Paresthesias: No.  .  .  . Comments:  Easy RAMOS to ID ES and 20 ml blood drawn from IV established and hep locked left ACS.  All 20 ml heme injected very easily without c/o. Pt then placed supine and will infuse 1 liter NaCl and have ETU staff administer Toradol.  Will attempt HOB elevation and ambulation in 30 minutes and will have ETU staff re-evaluate pt. at that time.  They will call anesthesia department if HA S&S continue as pre EBP.

## 2020-01-09 NOTE — ED TRIAGE NOTES
"Vag delivery here at [facility] on 1/6/20. Had a spinal. Slight ha on discharge that is worsening. Told to come in for \" a patch\". Breast feeding.  "

## 2020-01-10 NOTE — ANESTHESIA POSTPROCEDURE EVALUATION
Patient: Neo Parra    * No procedures listed *    Diagnosis:* No pre-op diagnosis entered *  Diagnosis Additional Information: No value filed.    Anesthesia Type:  Epidural    Note:  Anesthesia Post Evaluation    Patient location during evaluation: ED and Bedside  Patient participation: Able to fully participate in evaluation  Level of consciousness: awake and alert  Pain management: satisfactory to patient  Airway patency: patent  Cardiovascular status: stable  Respiratory status: spontaneous ventilation and room air  Hydration status: stable  PONV: none     Anesthetic complications: None    Comments: Appear to tolerate EBP placement well without anesthesia related problems / complications noted.  Pain level satisfactory per patient. No N  /  V .  No complaints per patient.  Will follow as needed.        Last vitals:  There were no vitals filed for this visit.      Electronically Signed By: HKANG Torres CRNA  January 9, 2020  8:30 PM

## 2020-01-12 NOTE — PROGRESS NOTES
Neo Parra  Gender: female  : 1990  [address]  [place] [address]  [phone] (home)   Medical Record: [id number]  Primary Care Provider: Melanie Bartlett       [facility]   ?   Discharge Phone Call: Key Words/Key Times     How are you and the baby?     How are feedings going?     Voiding & Stooling?     Any questions or concerns?     Follow-up appointment?       We want to provide excellent care here at [facility]. Do you have any feedback for us that would help us improve?     Call back COMMENTS:         Attempted Calls:   __ no answer.  Pt was seen in ED  for spinal headache_______     __________

## 2020-02-11 ENCOUNTER — TELEPHONE (OUTPATIENT)
Dept: FAMILY MEDICINE | Facility: CLINIC | Age: 30
End: 2020-02-11

## 2020-02-11 NOTE — TELEPHONE ENCOUNTER
LM for patient to return our call.  Please inform her Dr. Bartlett is out of clinic until 03/02/20.  We can send this to her to review when she gets back.  This should be fine to wait that long if Dr. Bartlett is okay seeing her around her daughter's appt time.  She should check with her insurance company to make sure being that it would be around 9 weeks post partum that it would still be bundled with her OB visits to make sure it's covered for her.  Mendoza Zabala CMA.

## 2020-02-11 NOTE — TELEPHONE ENCOUNTER
Reason for Call:  Same Day Appointment, Requested Provider:  Melanie Bartlett MD    PCP: Melanie Bartlett    Reason for visit: post partum visit needed.  Delivered 1/6/20.    Duration of symptoms: NA    Have you been treated for this in the past? No    Additional comments: daughter Rylee Gruba is scheduled 3/13/so at 10:45, asking if Dr Bartlett can see her at this same time.     Can we leave a detailed message on this number? YES    Phone number patient can be reached at: Cell number on file:    Telephone Information:   Mobile [phone]       Best Time: any time, is aware Dr Bartlett is out and may not hear back right away.     Call taken on 2/11/2020 at 4:46 PM by Orly Atkinson

## 2020-02-12 NOTE — TELEPHONE ENCOUNTER
Left message for call back. RACHEL could see her at the same time. Please let patient know that her appt. Is scheduled for 10:30am, so please have her arrive at 10:15am.     Thank you,    Shraddha Collier CMA ([facility])

## 2020-02-16 ENCOUNTER — HEALTH MAINTENANCE LETTER (OUTPATIENT)
Age: 30
End: 2020-02-16

## 2020-03-16 ENCOUNTER — MEDICAL CORRESPONDENCE (OUTPATIENT)
Dept: HEALTH INFORMATION MANAGEMENT | Facility: CLINIC | Age: 30
End: 2020-03-16

## 2020-05-19 ENCOUNTER — MEDICAL CORRESPONDENCE (OUTPATIENT)
Dept: HEALTH INFORMATION MANAGEMENT | Facility: CLINIC | Age: 30
End: 2020-05-19

## 2020-11-22 ENCOUNTER — HEALTH MAINTENANCE LETTER (OUTPATIENT)
Age: 30
End: 2020-11-22

## 2021-04-10 ENCOUNTER — HEALTH MAINTENANCE LETTER (OUTPATIENT)
Age: 31
End: 2021-04-10

## 2021-07-10 NOTE — PROGRESS NOTES
Chart reviewed.  The pregnancy was complicated with chronic hypertension, not on any medication for it.  She is taking low dose aspirin daily.  Also been having BPP twice a week and has been on modified bedrest.  Stated that her blood pressure at home has been stable around 120s-130s/80s. Planned for induction at 39 weeks if all is going well.    Stated overall she is doing well.  No headache or dizziness.  No acute change in her vision.  No upper abdominal pain or leg swelling.  Normal fetal movement with sporadic and non-painful contractions.  No leakage or vaginal bleeding.  No abnormal vaginal discharge.  No concerns today.    Cervix - not check today  .  Continue with prenatal vitamin and encouraged to drink a lot of water  Discussed signs of labor, preeclamptic symptoms  as well as symptoms need to be seen to call in.  Intrapartum care and pain management discussed - plan for epidural  Discussed about laboring down, indication for episiotomy, vacuum-assisted delivery and   Discussed kick counts and fetal movement.    Prenatal flowsheet information is reviewed.  May advance her normal activities as tolerated - no longer need to be on bedrest.    Today's BPP was normal - 8/8.    RTC 1 week as scheduled.    Mohit Aj Mai, MD      
36.8

## 2021-09-14 ENCOUNTER — OFFICE VISIT (OUTPATIENT)
Dept: FAMILY MEDICINE | Facility: OTHER | Age: 31
End: 2021-09-14
Payer: COMMERCIAL

## 2021-09-14 VITALS
SYSTOLIC BLOOD PRESSURE: 140 MMHG | BODY MASS INDEX: 29.89 KG/M2 | DIASTOLIC BLOOD PRESSURE: 72 MMHG | WEIGHT: 186 LBS | TEMPERATURE: 98 F | OXYGEN SATURATION: 99 % | RESPIRATION RATE: 16 BRPM | HEART RATE: 87 BPM | HEIGHT: 66 IN

## 2021-09-14 DIAGNOSIS — M54.42 ACUTE LEFT-SIDED LOW BACK PAIN WITH LEFT-SIDED SCIATICA: Primary | ICD-10-CM

## 2021-09-14 PROCEDURE — 99213 OFFICE O/P EST LOW 20 MIN: CPT | Performed by: PHYSICIAN ASSISTANT

## 2021-09-14 RX ORDER — CYCLOBENZAPRINE HCL 10 MG
10 TABLET ORAL 3 TIMES DAILY PRN
Qty: 30 TABLET | Refills: 0 | Status: SHIPPED | OUTPATIENT
Start: 2021-09-14 | End: 2021-09-24

## 2021-09-14 ASSESSMENT — PAIN SCALES - GENERAL: PAINLEVEL: MODERATE PAIN (5)

## 2021-09-14 ASSESSMENT — ENCOUNTER SYMPTOMS: BACK PAIN: 1

## 2021-09-14 ASSESSMENT — MIFFLIN-ST. JEOR: SCORE: 1590.44

## 2021-09-14 NOTE — PROGRESS NOTES
"    Assessment & Plan     Acute left-sided low back pain with left-sided sciatica  Very limited in its scope and single occurrence at this point time.  Encourage physical exercise and given some exercises and stretches to do on her after visit summary.  She is given medications as noted below.  If this does not resolve within 1 to 2 weeks and physical therapy is the next step.  No concerns for radicular type pain today.  Follow-up as needed.  - cyclobenzaprine (FLEXERIL) 10 MG tablet; Take 1 tablet (10 mg) by mouth 3 times daily as needed for muscle spasms ( careful use as this can cause fatigue)     BMI:   Estimated body mass index is 30.02 kg/m  as calculated from the following:    Height as of this encounter: 1.676 m (5' 6\").    Weight as of this encounter: 84.4 kg (186 lb).   Weight management plan: Patient was referred to their PCP to discuss a diet and exercise plan.  No follow-ups on file.    TOBI Curran [facility]   Steffi is a 30 year old who presents for the following health issues     Back Pain     History of Present Illness       Back Pain:  She presents for follow up of back pain. Patient's back pain is a new problem.    Original cause of back pain: turning/bending  First noticed back pain: yesterday  Patient feels back pain: constantlyLocation of back pain:  Right lower back and left lower back  Description of back pain: dull ache, sharp and shooting  Back pain spreads: left buttocks    Since patient first noticed back pain, pain is: unchanged  Does back pain interfere with her job:  Yes  On a scale of 1-10 (10 being the worst), patient describes pain as:  5  What makes back pain worse: bending, certain positions and sitting  Acupuncture: not tried  Acetaminophen: not helpful  Activity or exercise: not helpful  Chiropractor:  Not tried  Cold: not helpful  Heat: not helpful  Massage: not tried  Muscle relaxants: not tried  NSAIDS: helpful  Opioids: not " "tried  Physical Therapy: not tried  Rest: helpful  Steroid Injection: not tried  Stretching: not tried  Surgery: not tried  TENS unit: not tried  Topical pain relievers: not tried  Other healthcare providers patient is seeing for back pain: None    She eats 2-3 servings of fruits and vegetables daily.She consumes 1 sweetened beverage(s) daily.She exercises with enough effort to increase her heart rate 30 to 60 minutes per day.  She exercises with enough effort to increase her heart rate 3 or less days per week.   She is taking medications regularly.       Review of Systems   Musculoskeletal: Positive for back pain.      Constitutional, HEENT, cardiovascular, pulmonary, GI, , musculoskeletal, neuro, skin, endocrine and psych systems are negative, except as otherwise noted.      Objective    BP (!) 140/72   Pulse 87   Temp 98  F (36.7  C) (Temporal)   Resp 16   Ht 1.676 m (5' 6\")   Wt 84.4 kg (186 lb)   SpO2 99%   BMI 30.02 kg/m    Body mass index is 30.02 kg/m .  Physical Exam   GENERAL: healthy, alert and no distress  NECK: no adenopathy, no asymmetry, masses, or scars and thyroid normal to palpation  RESP: lungs clear to auscultation - no rales, rhonchi or wheezes  CV: regular rate and rhythm, normal S1 S2, no S3 or S4, no murmur, click or rub, no peripheral edema and peripheral pulses strong  ABDOMEN: soft, nontender, no hepatosplenomegaly, no masses and bowel sounds normal  MS: no gross musculoskeletal defects noted, no edema  SKIN: no suspicious lesions or rashes to exposed visible skin today.  NEURO: Normal strength and tone, mentation intact and speech normal  Comprehensive back pain exam:  Tenderness of Left lumbar region at about L3-L4, Range of motion not limited by pain, Lower extremity strength functional and equal on both sides, Lower extremity reflexes within normal limits bilaterally, Lower extremity sensation normal and equal on both sides and Straight leg raise negative bilaterally  PSYCH: " mentation appears normal, affect normal/bright    No results found for this or any previous visit (from the past 24 hour(s)).

## 2021-09-14 NOTE — PATIENT INSTRUCTIONS
Patient Education     Exercises to Strengthen Your Lower Back  Strong lower back and abdominal muscles work together to support your spine. The exercises below will help strengthen the lower back. It's important that you start exercising slowly and increase levels gradually.   Always start any exercise program with stretching. If you feel pain while doing any of these exercises, stop and talk to your doctor about a more specific exercise program that better suits your condition.    Low back stretch  The point of stretching is to make you more flexible and increase your range of motion. Stretch only as much as you are able. Stretch slowly. Don't push your stretch to the limit. If at any point you feel pain while stretching, this is your (temporary) limit.     Lie on your back with your knees bent and both feet on the ground.    Slowly raise your left knee to your chest as you flatten your lower back against the floor. Hold for 5 seconds.    Relax and repeat the exercise with your right knee.    Do 10 of these exercises for each leg.    Repeat hugging both knees to your chest at the same time.  Building lower back strength  Start your exercise routine with 10 to 30 minutes a day, 1 to 3 times a day.  Initial exercises  Lying on your back:  1. Ankle pumps. Move your foot up and down, towards your head, and then away. Repeat 10 times with each foot.  2. Heel slides. Slowly bend your knee, drawing the heel of your foot towards you. Then slide your heel/foot from you, straightening your knee. Don't lift your foot off the floor (this is not a leg lift).  3. Abdominal contraction. Bend your knees and put your hands on your stomach. Tighten your stomach muscles. Hold for 5 seconds, then relax. Repeat 10 times.  4. Straight leg raise. Bend one leg at the knee and keep the other leg straight. Tighten your stomach muscles. Slowly lift your straight leg 6 to 12 inches off the floor and hold for up to 5 seconds. Repeat 10 times  on each side.  Standin. Wall squats. Stand with your back against the wall. Move your feet about 12 inches away from the wall. Tighten your stomach muscles, and slowly bend your knees until they are at about a 45 degree angle. Don't go down too far. Hold about 5 seconds. Then slowly return to your starting position. Repeat 10 times.  2. Heel raises. Stand facing the wall. Slowly raise the heels of your feet up and down, while keeping your toes on the floor. If you have trouble balancing, you can touch the wall with your hands. Repeat 10 times.  More advanced exercises  When you feel comfortable enough, try these exercises.  1. Kneeling lumbar extension. Start on your hands and knees. At the same time, raise and straighten your right arm and left leg until they are parallel to the ground. Hold for 2 seconds and come back slowly to a starting position. Repeat with left arm and right leg, alternating 10 times.  2. Prone lumbar extension. Lie face down, arms extended overhead, palms on the floor. At the same time, raise your right arm and left leg as high as comfortably possible. Hold for 10 seconds and slowly return to start. Repeat with left arm and right leg, alternating 10 times. Gradually build up to 20 times. (Advanced: Repeat this exercise raising both arms and both legs a few inches off the floor at the same time. Hold for 5 seconds and release.)  3. Pelvic tilt. Lie on the floor on your back with your knees bent at 90 degrees. Your feet should be flat on the floor. Inhale, exhale, then slowly contract your abdominal muscles bringing your navel toward your spine. Let your pelvis rock back until your lower back is flat on the floor. Hold for 10 seconds while breathing smoothly.  4. Abdominal crunch. Perform a pelvic tilt (above) flattening your lower back against the floor. Holding the tension in your abdominal muscles, take another breath and raise your shoulder blades off the ground (this is not a full  sit-up). Keep your head in line with your body (don t bend your neck forward). Hold for 2 seconds, then slowly lower.  [employer] last reviewed this educational content on 8/1/2019 2000-2021 [employer] All rights reserved. This information is not intended as a substitute for professional medical care. Always follow your healthcare professional's instructions.           Patient Education     Back Exercises: Abdominal Lift Brace with Marching    The abdominal lift brace with march strengthens your lower abdominal muscles, helping you keep your pelvis and back stable:    Lie on the floor with both knees bent. Put your feet flat on the floor and your arms by your sides. Tighten your abdominal muscles. Be sure to continue to breathe.    Lift one bent knee about 2 inches then return it to the floor and lift the other about 2 inches. Keep your abdominal muscles tight and continue to breathe. These motions should be slow and controlled without your pelvis rocking side to side.    Repeat 10 times.  [employer] last reviewed this educational content on 3/1/2018    7046-9516 [employer] All rights reserved. This information is not intended as a substitute for professional medical care. Always follow your healthcare professional's instructions.           Patient Education     Back Exercises: Arm Reach    Do this exercise on your hands and knees. Keep your knees under your hips and your hands under your shoulders. Keep your spine in a neutral position (not arched or sagging). Be sure to maintain your neck s natural curve:    Stretch one arm straight out in front of you. Don t raise your head or let your supporting shoulder sag.    Hold for 5 seconds.    Return to starting position.    Repeat 5 times.    Switch arms.  [employer] last reviewed this educational content on 3/1/2018    4591-3211 [employer] All rights reserved. This information is not intended as a substitute for professional  medical care. Always follow your healthcare professional's instructions.           Patient Education     Back Exercises: Back Press    Do this exercise on your hands and knees. Keep your knees under your hips and your hands under your shoulders. Keep your spine in a neutral position (not arched or sagging). Be sure to maintain your neck s natural curve:    Tighten your stomach and buttock muscles to press your back upward. Let your head drop slightly.    Hold for 5 seconds. Return to starting position.    Repeat 5 times.  [employer] last reviewed this educational content on 3/1/2018    9654-1492 [employer] All rights reserved. This information is not intended as a substitute for professional medical care. Always follow your healthcare professional's instructions.           Patient Education     Back Exercises: Back Release  Do this exercise on your hands and knees. Keep your knees under your hips and your hands under your shoulders.        Relax your abdominal and buttocks muscles, lift your head, and let your back sag. Be sure to keep your weight evenly distributed. Don t sit back on your hips.     Hold for 5 seconds.    Return to starting position.    Tuck your head and lift (arch) your back.    Hold for 5 seconds    Return to starting position.    Repeat 5 times.  [employer] last reviewed this educational content on 3/1/2018    5951-4402 [employer] All rights reserved. This information is not intended as a substitute for professional medical care. Always follow your healthcare professional's instructions.           Patient Education     Back Exercises: Back Extension with Elbow Press    To start, lie face down on your stomach, feet slightly apart, forehead on the floor. Breathe deeply. You should feel comfortable and relaxed in this position.    Press up on your forearms. Keep your stomach and hips on the floor. Stay within your painfree range.    Hold for 20 seconds. Lower  slowly.    Repeat 2 times.    Return to starting position.  [employer] last reviewed this educational content on 3/1/2018    0783-1877 [employer] All rights reserved. This information is not intended as a substitute for professional medical care. Always follow your healthcare professional's instructions.           Patient Education     Back Exercises: Hip Rotator Stretch    To start, lie on your back with your knees bent and feet flat on the floor. Don t press your neck or lower back to the floor. Breathe deeply. You should feel comfortable and relaxed in this position.    Rest your right ankle on your left knee.    Place a towel behind your left thigh, and use it to pull the knee toward your chest. Feel the stretch in your buttocks.    Hold for 30 to 60 seconds. Release.    Repeat 2 times.    Switch legs.     If there is any pain other than stretch in the knee or buttock, stop and contact your healthcare provider.  For your safety, check with your healthcare provider before starting an exercise program.    [employer] last reviewed this educational content on 3/1/2018    0255-7331 [employer] All rights reserved. This information is not intended as a substitute for professional medical care. Always follow your healthcare professional's instructions.           Patient Education     Back Exercises: Knee Lift         To start, lie on your back with your knees bent and feet flat on the floor. Don t press your neck or lower back to the floor. Breathe deeply. You should feel comfortable and relaxed in this position:    Start by tightening your abdominal muscles.    Lift one bent knee off the floor 2 to 4 inches.    Hold for 10 seconds. Return to start position.    Repeat 3 times.    Switch legs.  [employer] last reviewed this educational content on 3/1/2018    6885-2189 [employer] All rights reserved. This information is not intended as a substitute for professional medical care.  Always follow your healthcare professional's instructions.           Patient Education     Back Exercises: Leg Pull    To start, lie on your back with your knees bent and feet flat on the floor. Don t press your neck or lower back to the floor. Breathe deeply. You should feel comfortable and relaxed in this position.    Pull one knee to your chest.    Hold for 30 to 60 seconds. Return to starting position.    Repeat 2 times.    Switch legs.    For a double leg pull, pull both legs to your chest at the same time. Repeat 2 times.  For your safety, check with your healthcare provider before starting an exercise program.    [employer] last reviewed this educational content on 3/1/2018    1457-5425 [employer] All rights reserved. This information is not intended as a substitute for professional medical care. Always follow your healthcare professional's instructions.           Patient Education     Back Exercises: Leg Reach      Do this exercise on your hands and knees. Keep your knees under your hips and your hands under your shoulders. Keep your spine in a neutral position (not arched or sagging). Be sure to maintain your neck s natural curve:    Extend one leg straight back. Don t arch your back or let your head or body sag.    Hold for 5 seconds. Return to starting position.    Repeat 5 times.    Switch legs.   [employer] last reviewed this educational content on 3/1/2018    4541-2936 [employer] All rights reserved. This information is not intended as a substitute for professional medical care. Always follow your healthcare professional's instructions.           Patient Education     Back Exercises: Lower Back Rotation    To start, lie on your back with your knees bent and feet flat on the floor. Don t press your neck or lower back to the floor. Breathe deeply. You should feel comfortable and relaxed in this position.    Drop both knees to one side. Turn your head to the other side. Keep  your shoulders flat on the floor.    Do not push through pain.    Hold for 20 seconds.    Slowly switch sides.    Repeat 2 to 5 times.  [employer] last reviewed this educational content on 3/1/2018    8194-6566 [employer] All rights reserved. This information is not intended as a substitute for professional medical care. Always follow your healthcare professional's instructions.           Patient Education     Back Exercises: Lower Back Stretch    To start, sit in a chair with your feet flat on the floor. Shift your weight slightly forward. Relax, and keep your ears, shoulders, and hips aligned while you do the following:    Sit with your feet well apart.    Bend forward and touch the floor with the backs of your hands. Relax and let your body drop.    Hold for  20 seconds. Return to starting position.    Repeat  2 times.   Note: If you've had back or hip surgery, talk with your healthcare provider before doing this stretch.  [employer] last reviewed this educational content on 4/1/2020 2000-2021 [employer] All rights reserved. This information is not intended as a substitute for professional medical care. Always follow your healthcare professional's instructions.           Patient Education     Back Exercises: Partial Curl-Ups    To start, lie on your back with your knees bent and feet flat on the floor. Don t press your neck or lower back to the floor. Breathe deeply. You should feel comfortable and relaxed in this position:    Cross your arms loosely.    Tighten your abdomen and curl FPC up, keeping your head in line with your shoulders.    Hold for 5 seconds. Uncurl to lie down.    Repeat 2 sets of 10.   [employer] last reviewed this educational content on 3/1/2018    6394-2788 [employer] All rights reserved. This information is not intended as a substitute for professional medical care. Always follow your healthcare professional's instructions.           Patient  Education     Back Exercises: Pelvic Tilt    To start, lie on your back with your knees bent and feet flat on the floor. Don t press your neck or lower back to the floor. Breathe deeply. You should feel comfortable and relaxed in this position:    Tighten your stomach and buttocks, and press your lower back toward the floor. This should be a small, subtle movement. This should not increase your pain.    Hold for 5 to 15 seconds. Release.    Repeat 2 to 5 times.  [employer] last reviewed this educational content on 3/1/2018    5063-5286 [employer] All rights reserved. This information is not intended as a substitute for professional medical care. Always follow your healthcare professional's instructions.           Patient Education     Back Exercises: Seated Rotation    To start, sit in a chair with your feet flat on the floor. Shift your weight slightly forward to avoid rounding your back. Relax, and keep your ears, shoulders, and hips aligned:    Fold your arms and elbows just below shoulder height.    Turn from the waist with hips forward. Turn your head last. Do not push through the pain.    Hold for a count of 10 to 30. Return to starting position.    Repeat 3 to 5 times on one side. Then switch sides.  [employer] last reviewed this educational content on 3/1/2018    8581-0403 [employer] All rights reserved. This information is not intended as a substitute for professional medical care. Always follow your healthcare professional's instructions.           Patient Education     Back Exercises: Side Stretch    To start, sit in a chair with your feet flat on the floor. Shift your weight slightly forward to avoid rounding your back. Relax. Keep your ears, shoulders, and hips aligned:    Stretch your right arm overhead.    Slowly bend to the left. Don t twist your torso. Stay within your pain limits.    Hold for 20 seconds. Return to starting position.    Repeat 2 to 5 times. Then, switch  to the other side.  [employer] last reviewed this educational content on 3/1/2018    4298-0809 [employer] All rights reserved. This information is not intended as a substitute for professional medical care. Always follow your healthcare professional's instructions.

## 2021-09-19 ENCOUNTER — HEALTH MAINTENANCE LETTER (OUTPATIENT)
Age: 31
End: 2021-09-19

## 2021-10-11 ENCOUNTER — TELEPHONE (OUTPATIENT)
Dept: FAMILY MEDICINE | Facility: CLINIC | Age: 31
End: 2021-10-11
Payer: COMMERCIAL

## 2021-10-11 NOTE — TELEPHONE ENCOUNTER
Patient Quality Outreach Summary      Summary:    Patient is due/failing the following:   Cervical Cancer Screening - PAP Needed and Physical     Type of outreach:    Phone, left message for patient/parent to call back.    Questions for provider review:    None                                                                                                                    Marya Warren CMA       Chart routed to Care Team.

## 2022-04-15 ENCOUNTER — OFFICE VISIT (OUTPATIENT)
Dept: FAMILY MEDICINE | Facility: CLINIC | Age: 32
End: 2022-04-15
Payer: COMMERCIAL

## 2022-04-15 VITALS
SYSTOLIC BLOOD PRESSURE: 134 MMHG | BODY MASS INDEX: 29.21 KG/M2 | HEART RATE: 126 BPM | RESPIRATION RATE: 16 BRPM | WEIGHT: 181 LBS | TEMPERATURE: 98.1 F | DIASTOLIC BLOOD PRESSURE: 80 MMHG | OXYGEN SATURATION: 100 %

## 2022-04-15 DIAGNOSIS — N92.1 MENORRHAGIA WITH IRREGULAR CYCLE: ICD-10-CM

## 2022-04-15 DIAGNOSIS — Z00.00 ROUTINE HISTORY AND PHYSICAL EXAMINATION OF ADULT: Primary | ICD-10-CM

## 2022-04-15 DIAGNOSIS — Z12.4 CERVICAL CANCER SCREENING: ICD-10-CM

## 2022-04-15 LAB — HGB BLD-MCNC: 12.7 G/DL (ref 11.7–15.7)

## 2022-04-15 PROCEDURE — G0145 SCR C/V CYTO,THINLAYER,RESCR: HCPCS | Performed by: PHYSICIAN ASSISTANT

## 2022-04-15 PROCEDURE — 99395 PREV VISIT EST AGE 18-39: CPT | Performed by: PHYSICIAN ASSISTANT

## 2022-04-15 PROCEDURE — 85018 HEMOGLOBIN: CPT | Performed by: PHYSICIAN ASSISTANT

## 2022-04-15 PROCEDURE — 36415 COLL VENOUS BLD VENIPUNCTURE: CPT | Performed by: PHYSICIAN ASSISTANT

## 2022-04-15 ASSESSMENT — PAIN SCALES - GENERAL: PAINLEVEL: NO PAIN (0)

## 2022-04-15 NOTE — PROGRESS NOTES
{PROVIDER CHARTING PREFERENCE:159279}    Jamin Kapadia is a 31 year old who presents for the following health issues     HPI     Chief Complaint   Patient presents with     Menstrual Problem     Heavy and irregular periods       {additonal problems for provider to add (Optional):320941}    Review of Systems   {ROS COMP (Optional):860613}      Objective    /80   Pulse (!) 126   Temp 98.1  F (36.7  C) (Temporal)   Resp 16   Wt 82.1 kg (181 lb)   SpO2 100%   BMI 29.21 kg/m    Body mass index is 29.21 kg/m .  Physical Exam   {Exam List (Optional):944724}    {Diagnostic Test Results (Optional):008184}    {AMBULATORY ATTESTATION (Optional):236595}

## 2022-04-15 NOTE — PROGRESS NOTES
SUBJECTIVE:   CC: Neo Parra is an 31 year old woman who presents for preventive health visit.       Patient has been advised of split billing requirements and indicates understanding: Yes  HPI    Periods problem; heavy and irregular   Patient reports that since her last pregnancy her periods have been much heavier than normal. More clotting. She reports periods last about a week. The first 2-3 days are very heavy. Periods are irregular and always have been. She reports cramping before her period but not during.     Today's PHQ-2 Score:   PHQ-2 ( 1999 [employer]) 9/14/2021   Q1: Little interest or pleasure in doing things 0   Q2: Feeling down, depressed or hopeless 0   PHQ-2 Score 0   PHQ-2 Total Score (12-17 Years)- Positive if 3 or more points; Administer PHQ-A if positive 0   Q1: Little interest or pleasure in doing things Not at all   Q2: Feeling down, depressed or hopeless Not at all   PHQ-2 Score 0       Abuse: Current or Past (Physical, Sexual or Emotional) - No  Do you feel safe in your environment? Yes    Have you ever done Advance Care Planning? (For example, a Health Directive, POLST, or a discussion with a medical provider or your loved ones about your wishes): Yes, patient states has an Advance Care Planning document and will bring a copy to the clinic.    Social History     Tobacco Use     Smoking status: Never Smoker     Smokeless tobacco: Never Used   Substance Use Topics     Alcohol use: No     If you drink alcohol do you typically have >3 drinks per day or >7 drinks per week? No    Reviewed orders with patient.  Reviewed health maintenance and updated orders accordingly - Yes  BP Readings from Last 3 Encounters:   04/15/22 134/80   09/14/21 (!) 140/72   01/09/20 (!) 128/90    Wt Readings from Last 3 Encounters:   04/15/22 82.1 kg (181 lb)   09/14/21 84.4 kg (186 lb)   01/09/20 81.6 kg (180 lb)                  Patient Active Problem List   Diagnosis     CARDIOVASCULAR SCREENING; LDL GOAL LESS  THAN 160     Migraine with aura and without status migrainosus, not intractable      (normal spontaneous vaginal delivery)     Postpartum care and examination of lactating mother     Pre-existing essential hypertension during pregnancy in third trimester     Supervision of high risk pregnancy in third trimester     Iron deficiency anemia of pregnancy     Acute left-sided low back pain with left-sided sciatica     Past Surgical History:   Procedure Laterality Date     ZZHC CREATE EARDRUM OPENING,GEN ANESTH  1994    P.E. Tubes x 2       Social History     Tobacco Use     Smoking status: Never Smoker     Smokeless tobacco: Never Used   Substance Use Topics     Alcohol use: No     Family History   Problem Relation Age of Onset     Hypertension Mother      Myocardial Infarction Mother      No Known Problems Father      No Known Problems Brother      No Known Problems Sister      No Known Problems Sister      No Known Problems Brother      No Known Problems Brother      No Known Problems Maternal Grandmother      No Known Problems Maternal Grandfather      No Known Problems Paternal Grandmother      No Known Problems Daughter      No Known Problems Paternal Grandfather      No Known Problems Daughter          No current outpatient medications on file.       Breast Cancer Screening:  Any new diagnosis of family breast, ovarian, or bowel cancer? No    FHS-7: No flowsheet data found.  Patient under 40 years of age: Routine Mammogram Screening not recommended.   Pertinent mammograms are reviewed under the imaging tab.    History of abnormal Pap smear: NO - age 21-29 PAP every 3 years recommended  PAP / HPV 2018 11/3/2014   PAP (Historical) NIL NIL     Reviewed and updated as needed this visit by clinical staff   Tobacco  Allergies  Meds   Med Hx  Surg Hx  Fam Hx  Soc Hx          Reviewed and updated as needed this visit by Provider                       Review of Systems  CONSTITUTIONAL: NEGATIVE for fever,  chills, change in weight  INTEGUMENTARU/SKIN: NEGATIVE for worrisome rashes, moles or lesions  EYES: NEGATIVE for vision changes or irritation  ENT: NEGATIVE for ear, mouth and throat problems  RESP: NEGATIVE for significant cough or SOB  BREAST: NEGATIVE for masses, tenderness or discharge  CV: NEGATIVE for chest pain, palpitations or peripheral edema  GI: NEGATIVE for nausea, abdominal pain, heartburn, or change in bowel habits  : NEGATIVE for unusual urinary or vaginal symptoms. Periods are regular.  MUSCULOSKELETAL: NEGATIVE for significant arthralgias or myalgia  NEURO: NEGATIVE for weakness, dizziness or paresthesias  PSYCHIATRIC: NEGATIVE for changes in mood or affect     OBJECTIVE:   /80   Pulse (!) 126   Temp 98.1  F (36.7  C) (Temporal)   Resp 16   Wt 82.1 kg (181 lb)   SpO2 100%   BMI 29.21 kg/m    Physical Exam  GENERAL: healthy, alert and no distress  EYES: Eyes grossly normal to inspection, PERRL and conjunctivae and sclerae normal  HENT: ear canals and TM's normal, nose and mouth without ulcers or lesions  NECK: no adenopathy, no asymmetry, masses, or scars and thyroid normal to palpation  RESP: lungs clear to auscultation - no rales, rhonchi or wheezes  BREAST: normal without masses, tenderness or nipple discharge and no palpable axillary masses or adenopathy  CV: regular rate and rhythm, normal S1 S2, no S3 or S4, no murmur, click or rub, no peripheral edema and peripheral pulses strong  ABDOMEN: soft, nontender, no hepatosplenomegaly, no masses and bowel sounds normal   (female): normal female external genitalia, normal urethral meatus, vaginal mucosa pink, moist, well rugated, and normal cervix/adnexa/uterus without masses or discharge  MS: no gross musculoskeletal defects noted, no edema  SKIN: no suspicious lesions or rashes  NEURO: Normal strength and tone, mentation intact and speech normal  PSYCH: mentation appears normal, affect normal/bright    Diagnostic Test Results:  Labs  "reviewed in Epic    ASSESSMENT/PLAN:   (Z00.00) Routine history and physical examination of adult  (primary encounter diagnosis)    (Z12.4) Cervical cancer screening  Plan: Pap Screen reflex to HPV if ASCUS - recommend         age 25 - 29, HPV Hold (Lab Only)    (N92.1) Menorrhagia with irregular cycle  Comment: Pap completed today. Will also obtain hemoglobin given heavier than normal periods. A pelvic ultrasound was also ordered to further evaluate. Further follow-up pending this result.   Plan: Hemoglobin, US Pelvic Complete with         Transvaginal      Patient has been advised of split billing requirements and indicates understanding: Yes    COUNSELING:  Reviewed preventive health counseling, as reflected in patient instructions    Estimated body mass index is 29.21 kg/m  as calculated from the following:    Height as of 9/14/21: 1.676 m (5' 6\").    Weight as of this encounter: 82.1 kg (181 lb).    Weight management plan: Discussed healthy diet and exercise guidelines    She reports that she has never smoked. She has never used smokeless tobacco.      Counseling Resources:  ATP IV Guidelines  Pooled Cohorts Equation Calculator  Breast Cancer Risk Calculator  BRCA-Related Cancer Risk Assessment: FHS-7 Tool  FRAX Risk Assessment  ICSI Preventive Guidelines  Dietary Guidelines for Americans, 2010  USDA's MyPlate  ASA Prophylaxis  Lung CA Screening    Jerrica Hassan PA-C  [facility]  "

## 2022-04-19 LAB
BKR LAB AP GYN ADEQUACY: NORMAL
BKR LAB AP GYN INTERPRETATION: NORMAL
BKR LAB AP HPV REFLEX: NORMAL
BKR LAB AP PREVIOUS ABNORMAL: NORMAL
PATH REPORT.COMMENTS IMP SPEC: NORMAL
PATH REPORT.COMMENTS IMP SPEC: NORMAL
PATH REPORT.RELEVANT HX SPEC: NORMAL

## 2022-04-19 NOTE — RESULT ENCOUNTER NOTE
Attempted to call mother in regards to Mychart messages. LVM with a call back number.    Reviewed at appt.  Melanie Bartlett MD

## 2022-06-27 ENCOUNTER — HOSPITAL ENCOUNTER (OUTPATIENT)
Dept: ULTRASOUND IMAGING | Facility: CLINIC | Age: 32
Discharge: HOME | End: 2022-06-27
Attending: PHYSICIAN ASSISTANT | Admitting: PHYSICIAN ASSISTANT
Payer: COMMERCIAL

## 2022-06-27 DIAGNOSIS — N92.1 MENORRHAGIA WITH IRREGULAR CYCLE: ICD-10-CM

## 2022-06-27 PROCEDURE — 76856 US EXAM PELVIC COMPLETE: CPT

## 2022-08-26 ENCOUNTER — LAB (OUTPATIENT)
Dept: LAB | Facility: CLINIC | Age: 32
End: 2022-08-26
Payer: COMMERCIAL

## 2022-08-26 DIAGNOSIS — N92.1 MENORRHAGIA WITH IRREGULAR CYCLE: ICD-10-CM

## 2022-08-26 LAB
ERYTHROCYTE [DISTWIDTH] IN BLOOD BY AUTOMATED COUNT: 13.5 % (ref 10–15)
ESTRADIOL SERPL-MCNC: 29 PG/ML
FSH SERPL IRP2-ACNC: 5.7 MIU/ML
HCT VFR BLD AUTO: 36.6 % (ref 35–47)
HGB BLD-MCNC: 12.1 G/DL (ref 11.7–15.7)
LH SERPL-ACNC: 7.8 MIU/ML
MCH RBC QN AUTO: 28.6 PG (ref 26.5–33)
MCHC RBC AUTO-ENTMCNC: 33.1 G/DL (ref 31.5–36.5)
MCV RBC AUTO: 87 FL (ref 78–100)
PLATELET # BLD AUTO: 215 10E3/UL (ref 150–450)
RBC # BLD AUTO: 4.23 10E6/UL (ref 3.8–5.2)
TSH SERPL DL<=0.005 MIU/L-ACNC: 1.53 MU/L (ref 0.4–4)
WBC # BLD AUTO: 5 10E3/UL (ref 4–11)

## 2022-08-26 PROCEDURE — 84443 ASSAY THYROID STIM HORMONE: CPT

## 2022-08-26 PROCEDURE — 85027 COMPLETE CBC AUTOMATED: CPT

## 2022-08-26 PROCEDURE — 82670 ASSAY OF TOTAL ESTRADIOL: CPT

## 2022-08-26 PROCEDURE — 83002 ASSAY OF GONADOTROPIN (LH): CPT

## 2022-08-26 PROCEDURE — 84403 ASSAY OF TOTAL TESTOSTERONE: CPT

## 2022-08-26 PROCEDURE — 83001 ASSAY OF GONADOTROPIN (FSH): CPT

## 2022-08-26 PROCEDURE — 82627 DEHYDROEPIANDROSTERONE: CPT

## 2022-08-26 PROCEDURE — 36415 COLL VENOUS BLD VENIPUNCTURE: CPT

## 2022-08-29 LAB
DHEA-S SERPL-MCNC: 218 UG/DL (ref 35–430)
TESTOST SERPL-MCNC: 23 NG/DL (ref 8–60)

## 2022-09-06 ENCOUNTER — TELEPHONE (OUTPATIENT)
Dept: FAMILY MEDICINE | Facility: CLINIC | Age: 32
End: 2022-09-06

## 2022-09-06 NOTE — TELEPHONE ENCOUNTER
----- Message from Jerrica Hassan PA-C sent at 9/5/2022 10:33 AM CDT -----  Please notify patient/parent of result as [facility]on[facility] message not read.    Jerrica Hassan PA-C

## 2022-09-06 NOTE — TELEPHONE ENCOUNTER
Called and LM for patient to call back. Please relay results below.   Gin Naranjo MA     Labs all look really good - which I know is frustrating with periods being abnormal. We may want to connect you with OB/GYN to further discuss this. Please let me know if you have any questions/concerns.     Jerrica Hassan

## 2022-09-06 NOTE — LETTER
[address] [address]  [phone]        September 8, 2022    Neo Parra  [address] [place] [id number]          Dear Neo,    Labs all look really good - which I know is frustrating with periods being abnormal. We may want to connect you with OB/GYN to further discuss this. Please let me know if you have any questions/concerns    Sincerely,        Care Team Per Jerrica Fenton

## 2022-09-08 NOTE — TELEPHONE ENCOUNTER
2nd attempt. Left message for patient to return call. Sending letter    Bina Salvador MA 9/8/2022

## 2022-11-11 ENCOUNTER — OFFICE VISIT (OUTPATIENT)
Dept: OBGYN | Facility: CLINIC | Age: 32
End: 2022-11-11
Payer: COMMERCIAL

## 2022-11-11 VITALS
WEIGHT: 183 LBS | DIASTOLIC BLOOD PRESSURE: 82 MMHG | HEIGHT: 65 IN | SYSTOLIC BLOOD PRESSURE: 132 MMHG | TEMPERATURE: 97.5 F | BODY MASS INDEX: 30.49 KG/M2

## 2022-11-11 DIAGNOSIS — E28.2 PCOS (POLYCYSTIC OVARIAN SYNDROME): Primary | ICD-10-CM

## 2022-11-11 DIAGNOSIS — N97.9 FEMALE INFERTILITY: ICD-10-CM

## 2022-11-11 PROCEDURE — 99243 OFF/OP CNSLTJ NEW/EST LOW 30: CPT | Performed by: OBSTETRICS & GYNECOLOGY

## 2022-11-11 NOTE — PROGRESS NOTES
SUBJECTIVE:       HPI: Neo Parra is a 31 year old  who presents today for presents today for consultation regarding abnormal bleeding and fertility concerns, referral from Jerrica Hassan.    Menses every 21-90 days, lasting 7 days. Flow heavy with clotting (large), worse than prior to last pregnancy. Minimally associated cramping.   Menarche 12yo. Has tried taking OCPs previously for periods but did not like how they made her feel. Has been using NFP since.   Patient is sexually active. One male partner.     She has excessive hair growth on her face (mustache), no significant acne. No known history of PCOS.  No hot flashes, vaginal dryness, night sweats. No prior gyn procedures, including uterine instrumentation.     Denies visual changes, headaches or galactorrhea.    Female factor:    General health: history of migraines when pregnant, history of pre-eclampsia, BMI 30  Prior pregnancies:  Yes. No issues with conceiving for 1st/3rd pregnancy. Was deliberately ttc with 2nd  Previous infertility work up:  No- some labs performed per Primary care provider (not timed)  Most recent form of birth control:  OCPs- 9 years ago  History of STI:  No  Ovulation predictor kits: Yes- no high peak positive  Timed intercourse: No - unable to reliable, having sex 1-2 times per week  Tubal study done?:  No    Occupation of Patient: Works for a manufacturing plant  Chemical or toxin exposure at home or work: No    Male factor:   General health: Healthy  Father of prior pregnancies:  Yes  History of testicular trauma or mumps: None  Semen Analysis: No      Occupation of Partner: Works for a manufacturing plant  Chemical or toxin exposure at home or work: No      Ob Hx:  s/p   OB History    Para Term  AB Living   3 3 3 0 0 3   SAB IAB Ectopic Multiple Live Births   0 0 0 0 3      # Outcome Date GA Lbr Miguel Ángel/2nd Weight Sex Delivery Anes PTL Lv   3 Term 20 39w1d 02:04 / 01:41 3.945 kg (8 lb 11.2 oz) F  Vag-Spont EPI N MO      Name: Rylee      Apgar1: 9  Apgar5: 9   2 Term 10/06/18 39w1d 01:48 / 00:10 3.94 kg (8 lb 11 oz) F Vag-Spont EPI N MO      Complications: Preeclampsia/Hypertension      Name: Felicitas      Apgar1: 9  Apgar5: 9   1 Term 16 39w4d 05:25 / 02:11 3.779 kg (8 lb 5.3 oz) F Vag-Spont EPI N MO      Name: Erika      Apgar1: 8  Apgar5: 9    .      Gyn Hx: Patient's last menstrual period was 10/07/2022 (exact date).     Last pap was 4/15/22 NIL  Family history of gyn-related malignancies: denies          Today's PHQ-2 Score:   PHQ-2 (  [employer]) 2022   Q1: Little interest or pleasure in doing things 0   Q2: Feeling down, depressed or hopeless 0   PHQ-2 Score 0   PHQ-2 Total Score (12-17 Years)- Positive if 3 or more points; Administer PHQ-A if positive -   Q1: Little interest or pleasure in doing things -   Q2: Feeling down, depressed or hopeless -   PHQ-2 Score -     Today's PHQ-9 Score:   PHQ-9 SCORE 2016   PHQ-9 Total Score 0     Today's TD-7 Score: No flowsheet data found.    Problem list and histories reviewed & adjusted, as indicated.  Additional history: as documented.    Patient Active Problem List   Diagnosis     CARDIOVASCULAR SCREENING; LDL GOAL LESS THAN 160     Migraine with aura and without status migrainosus, not intractable      (normal spontaneous vaginal delivery)     Postpartum care and examination of lactating mother     Pre-existing essential hypertension during pregnancy in third trimester     Supervision of high risk pregnancy in third trimester     Iron deficiency anemia of pregnancy     Acute left-sided low back pain with left-sided sciatica     Past Surgical History:   Procedure Laterality Date     ZZ CREATE EARDRUM OPENING,GEN ANESTH  1994    P.E. Tubes x 2      Social History     Tobacco Use     Smoking status: Never     Smokeless tobacco: Never   Substance Use Topics     Alcohol use: No      Problem (# of Occurrences) Relation (Name,Age of Onset)     "Hypertension (1) Mother (Gayathri)    Myocardial Infarction (1) Mother (Gayathri)    No Known Problems (12) Father (Marcelino), Brother (Fernando), Sister (Jacquelyn), Sister (Lay), Brother (Samuel), Brother (Agus), Maternal Grandmother, Maternal Grandfather, Paternal Grandmother, Daughter (Erika), Paternal Grandfather, Daughter (Felicitas)            No current outpatient medications on file prior to visit.  No current facility-administered medications on file prior to visit.    Allergies   Allergen Reactions     Cephalosporins Hives     rash reaction from SUPRAX     Tdap [Daptacel] Other (See Comments)     Tingling/numbness of the ipsilateral arm and mouth felt \"weird\" inside, lips tingling. No swelling or SOB.        ROS:  10 Point review of systems negative other noted above in HPI    OBJECTIVE:     /82   Temp 97.5  F (36.4  C) (Temporal)   Ht 1.651 m (5' 5\")   Wt 83 kg (183 lb)   LMP 10/07/2022 (Exact Date)   BMI 30.45 kg/m    Body mass index is 30.45 kg/m .      Gen: Alert, oriented, appropriately interactive, NAD  Eyes: Eyes grossly normal to inspection, conjunctivae and sclerae normal  Resp: no audible wheeze, cough, or visible cyanosis.  No visible retractions or increased work of breathing.  Able to speak fully in complete sentences.  Abdomen: soft, non tender, non distended, no masses  External genitalia: no lesions; normal appearing external genitalia, bartholins glands, urethra, skenes glands  Vagina: no masses or lesions or discharge, normally rugated.  Cervix: no masses or lesions or discharge   Bimanual exam:   Nontender pelvic floor muscles  Urethra: nontender   Bladder: nontender and without massess, well supported   Uterus: midline, anteverted, small, mobile  no masses, non-tender  Adnexa: no masses or tenderness appreciated   No cervical motion tenderness  MSK: normal gait, symmetric movements UE & LE  Lower extremities: non-tender, no edema  Neuro: Cranial nerves grossly intact, mentation intact and " speech normal  Psych: mentation appears normal, affect normal/bright, judgement and insight intact, normal speech and appearance well-groomed        In-Clinic Test Results:  No results found for this or any previous visit (from the past 24 hour(s)).   Component      Latest Ref Rng & Units 2022   WBC      4.0 - 11.0 10e3/uL 5.0   RBC Count      3.80 - 5.20 10e6/uL 4.23   Hemoglobin      11.7 - 15.7 g/dL 12.1   Hematocrit      35.0 - 47.0 % 36.6   MCV      78 - 100 fL 87   MCH      26.5 - 33.0 pg 28.6   MCHC      31.5 - 36.5 g/dL 33.1   RDW      10.0 - 15.0 % 13.5   Platelet Count      150 - 450 10e3/uL 215   TSH      0.40 - 4.00 mU/L 1.53   FSH      mIU/mL 5.7   Lutropin      mIU/mL 7.8   Estradiol      pg/mL 29   DHEA Sulfate      35 - 430 ug/dL 218   Testosterone Total      8 - 60 ng/dL 23     Results for orders placed or performed during the hospital encounter of 22   US Pelvic Complete with Transvaginal    Narrative    US PELVIC TRANSABDOMINAL AND TRANSVAGINAL 2022 2:13 PM    CLINICAL HISTORY: Menorrhagia with irregular cycle  TECHNIQUE: Transabdominal scans were performed. Endovaginal ultrasound  was performed to better visualize the adnexa.    COMPARISON: None.    FINDINGS:    UTERUS: 6.9 x 5.9 x 5.7 cm. Normal in size and retroverted with no  masses.    ENDOMETRIUM: 11 mm. Normal smooth endometrium. Tiny cyst within the  endometrium.  RIGHT OVARY: 4.1 x 2.8 x 2.7 cm. Normal with flow demonstrated.    LEFT OVARY: 3.7 x 2.5 x 4.0 cm. Normal with flow demonstrated.    No significant free fluid.      Impression    IMPRESSION:  1.  Normal pelvic ultrasound.      YENNI PERKINS MD         SYSTEM ID:  [id number]        ASSESSMENT/PLAN:                                                      Neo Parra is a 31 year old  who presents today for consultation regarding abnormal bleeding and fertility concerns, referral from Jerrica Hassan      ICD-10-CM    1. PCOS (polycystic ovarian syndrome)   E28.2 Follicle stimulating hormone     Luteinizing Hormone, Adult     Estradiol     Progesterone     17 OH progesterone     Prolactin     XR Hysterosalpingogram     Lipid panel reflex to direct LDL Fasting     Hemoglobin A1c      2. Female infertility  N97.9           Discussed male and female factors of infertility.  I discussed the association of regular ovulation and regular menstruation.  Discussed possible testing including semen analysis, laboratory evaluation of ovulation, and evaluation of uterine/tubal anatomy.   80% of couples will achieve pregnancy in the first 6 months of trying, and infertility work-up is not typically necessary until 12 months of trying without pregnancy. Furthermore, discussed using ovulation kits, tracking cervical mucous and phone emily for tracking ovulation. Discussed the fertile window and timing of intercourse.    Patient cycles concerning for PCOS. Labs and imaging above, however recommend repeating labs as timed with cycles in addition to remainder of work-up, including semen analysis, HSG and remaining fertility labs. Patient in agreement with this, discussed when to prescribe Provera to induce menses. Follow-up in 1-2 months after completion of evaluation to discuss management options. Printed information provided today.    She was given the opportunity to ask questions and have them answered.      Kimberly Restrepo, DO  [facility]

## 2022-11-11 NOTE — PATIENT INSTRUCTIONS
If you have any questions regarding your visit, Please contact your care team.    [facility]: [phone]      Women s Health CLINIC HOURS TELEPHONE NUMBER   Kimberly Restrepo DO.    HERIBERTO Thibodeaux-Surgery Scheduler  Sherie - Surgery Scheduler    PING Maxwell, PING Rodríguez RN     Monday, Thursday  [place]  7am-3pm    Tuesday, Wednesday  [place]  7am-3pm    E/O Friday &   [place]    Typical Surgery Days: Thursday or Friday   [facility]  [address] [place] [address] [phone] Phone  [phone] Fax    [facility]  [address] [place] [address] [phone] Phone    Imaging [phone] Phone    [facility] Delivery:  [phone] Phone     **Surgeries** Our Surgery Schedulers will contact you to schedule. If you do not receive a call within 3 business days, please call [phone].    Urgent Care locations:  [facility] Saturday and    9 am - 5 pm    Monday-Friday   12 pm - 8 pm  Saturday and    9 am - 5 pm   [phone]       If you need a medication refill, please contact your pharmacy. Please allow 3 business days for your refill to be completed.  As always, Thank you for trusting us with your healthcare needs!     Labs that we order for further evaluation of fertility:  On day 3 of your cycle: E2 (estradiol), LH, FSH  On day 21 of your cycle: Progesterone  Either time: TSH, Prolactin    Other testing recommended:  Semen analysis for partner  HSG (Hysterosalpingogram) - performed in radiology department

## 2022-11-13 ENCOUNTER — MYC MEDICAL ADVICE (OUTPATIENT)
Dept: OBGYN | Facility: CLINIC | Age: 32
End: 2022-11-13

## 2022-11-13 DIAGNOSIS — N97.0 ANOVULATION: ICD-10-CM

## 2022-11-13 DIAGNOSIS — E28.2 PCOS (POLYCYSTIC OVARIAN SYNDROME): Primary | ICD-10-CM

## 2022-11-13 DIAGNOSIS — N97.9 FEMALE INFERTILITY: ICD-10-CM

## 2022-11-14 RX ORDER — MEDROXYPROGESTERONE ACETATE 10 MG
10 TABLET ORAL DAILY
Qty: 10 TABLET | Refills: 0 | Status: SHIPPED | OUTPATIENT
Start: 2022-11-14 | End: 2023-02-13

## 2022-11-14 RX ORDER — LETROZOLE 2.5 MG/1
2.5 TABLET, FILM COATED ORAL DAILY
Qty: 5 TABLET | Refills: 0 | Status: SHIPPED | OUTPATIENT
Start: 2022-11-14 | End: 2023-02-13

## 2022-11-14 NOTE — TELEPHONE ENCOUNTER
"Per 11/11/22 OV with Dr. Restrepo:  \"Patient cycles concerning for PCOS. Labs and imaging above, however recommend repeating labs as timed with cycles in addition to remainder of work-up, including semen analysis, HSG and remaining fertility labs. Patient in agreement with this, discussed when to prescribe Provera to induce menses. Follow-up in 1-2 months after completion of evaluation to discuss management options. Printed information provided today.\"    Pt is writing in asking if she can try \"the medication\" first before doing the HSG and SA.  She states she has 3 children and doesn't feel like those tests are necessary.  She would like to try the medication first and if it doesn't work will consider doing the HSG and SA if needed.    Routing to Dr. Restrepo to further advise on pt's question.    Alissa Ferrer RN    "

## 2022-11-14 NOTE — TELEPHONE ENCOUNTER
"Kimberly Restrepo, DO  [facility] Triage  Phone Number: [phone]     \"Strongly recommend HSG and semen analysis prior to any medications. This is standard of care. I understand her frustrations however with this process and wanting to try medications first. Given spontaneous pregnancies previously, would be willing to try 2 cycles of letrozole for ovulation induction with the caveat that if no pregnancy then HSG and semen analysis required before any further medications.   Letrozole regimen: 2.5mg/d, on cycle days 3-7 (following spontaneous menses or progestin-induced bleed).   May use OPK to track ovulation with timed intercourse during fertile window, with day 21 progesterone if concerns about +ovulation during cycle.   Please let me know if this is what she would like to do and if she is agreeable.\"     Pt would like to try 2 rounds of Letrozole before doing an HSG and SA.  She is on day #39 of her cycle.  She took a negative home UPT on 11/11.    Routing to Dr. Restrepo to send in an rx for a 10 day supply of Provera to induce a period if appropriate.    Alissa Ferrer RN      "

## 2022-11-14 NOTE — TELEPHONE ENCOUNTER
"Kimberly Restrepo, DO  [facility] Triage  Caller: Unspecified (Yesterday,  2:34 PM)  Phone Number: [phone]     \"Rx provera to induce withdrawal bleed and Letrozole sent to pharmacy per request. Call office in 1 month for refill vs +UPT. Recommend progesterone level on day 21.\"  "

## 2022-11-21 ENCOUNTER — HEALTH MAINTENANCE LETTER (OUTPATIENT)
Age: 32
End: 2022-11-21

## 2022-12-02 ENCOUNTER — LAB (OUTPATIENT)
Dept: LAB | Facility: CLINIC | Age: 32
End: 2022-12-02
Payer: COMMERCIAL

## 2022-12-02 DIAGNOSIS — E28.2 PCOS (POLYCYSTIC OVARIAN SYNDROME): ICD-10-CM

## 2022-12-02 LAB
CHOLEST SERPL-MCNC: 115 MG/DL
ESTRADIOL SERPL-MCNC: 40 PG/ML
FASTING STATUS PATIENT QL REPORTED: YES
FSH SERPL IRP2-ACNC: 4.5 MIU/ML
HBA1C MFR BLD: 5.3 % (ref 0–5.6)
HDLC SERPL-MCNC: 40 MG/DL
LDLC SERPL CALC-MCNC: 68 MG/DL
LH SERPL-ACNC: 4.2 MIU/ML
NONHDLC SERPL-MCNC: 75 MG/DL
PROLACTIN SERPL 3RD IS-MCNC: 13 NG/ML (ref 5–23)
TRIGL SERPL-MCNC: 34 MG/DL

## 2022-12-02 PROCEDURE — 84146 ASSAY OF PROLACTIN: CPT

## 2022-12-02 PROCEDURE — 80061 LIPID PANEL: CPT

## 2022-12-02 PROCEDURE — 83498 ASY HYDROXYPROGESTERONE 17-D: CPT

## 2022-12-02 PROCEDURE — 82670 ASSAY OF TOTAL ESTRADIOL: CPT

## 2022-12-02 PROCEDURE — 83002 ASSAY OF GONADOTROPIN (LH): CPT

## 2022-12-02 PROCEDURE — 36415 COLL VENOUS BLD VENIPUNCTURE: CPT

## 2022-12-02 PROCEDURE — 83036 HEMOGLOBIN GLYCOSYLATED A1C: CPT

## 2022-12-02 PROCEDURE — 83001 ASSAY OF GONADOTROPIN (FSH): CPT

## 2022-12-08 LAB — 17OHP SERPL-MCNC: 23 NG/DL

## 2022-12-20 ENCOUNTER — LAB (OUTPATIENT)
Dept: LAB | Facility: CLINIC | Age: 32
End: 2022-12-20
Payer: COMMERCIAL

## 2022-12-20 DIAGNOSIS — E28.2 PCOS (POLYCYSTIC OVARIAN SYNDROME): ICD-10-CM

## 2022-12-20 PROCEDURE — 36415 COLL VENOUS BLD VENIPUNCTURE: CPT

## 2022-12-20 PROCEDURE — 84144 ASSAY OF PROGESTERONE: CPT

## 2022-12-21 LAB — PROGEST SERPL-MCNC: 0.6 NG/ML

## 2023-01-03 ENCOUNTER — LAB (OUTPATIENT)
Dept: LAB | Facility: CLINIC | Age: 33
End: 2023-01-03
Payer: COMMERCIAL

## 2023-01-03 DIAGNOSIS — E28.2 PCOS (POLYCYSTIC OVARIAN SYNDROME): ICD-10-CM

## 2023-01-03 LAB — B-HCG SERPL-ACNC: 146 IU/L (ref 0–5)

## 2023-01-03 PROCEDURE — 36415 COLL VENOUS BLD VENIPUNCTURE: CPT

## 2023-01-03 PROCEDURE — 84702 CHORIONIC GONADOTROPIN TEST: CPT

## 2023-01-05 ENCOUNTER — LAB (OUTPATIENT)
Dept: LAB | Facility: CLINIC | Age: 33
End: 2023-01-05
Payer: COMMERCIAL

## 2023-01-05 DIAGNOSIS — E28.2 PCOS (POLYCYSTIC OVARIAN SYNDROME): ICD-10-CM

## 2023-01-05 LAB — B-HCG SERPL-ACNC: 414 IU/L (ref 0–5)

## 2023-01-05 PROCEDURE — 84702 CHORIONIC GONADOTROPIN TEST: CPT

## 2023-01-05 PROCEDURE — 36415 COLL VENOUS BLD VENIPUNCTURE: CPT

## 2023-01-06 ENCOUNTER — MYC MEDICAL ADVICE (OUTPATIENT)
Dept: FAMILY MEDICINE | Facility: CLINIC | Age: 33
End: 2023-01-06

## 2023-01-09 ENCOUNTER — VIRTUAL VISIT (OUTPATIENT)
Dept: FAMILY MEDICINE | Facility: CLINIC | Age: 33
End: 2023-01-09
Payer: COMMERCIAL

## 2023-01-09 DIAGNOSIS — Z34.90 SUPERVISION OF NORMAL PREGNANCY: Primary | ICD-10-CM

## 2023-01-09 PROCEDURE — 99207 PR NO CHARGE NURSE ONLY: CPT

## 2023-01-09 RX ORDER — PRENATAL VIT/IRON FUM/FOLIC AC 27MG-0.8MG
1 TABLET ORAL DAILY
COMMUNITY

## 2023-01-09 NOTE — PROGRESS NOTES
OB Intake phone visit with verbal patient permission.     Neo is not vaccinated against covid.

## 2023-01-19 ENCOUNTER — MYC MEDICAL ADVICE (OUTPATIENT)
Dept: FAMILY MEDICINE | Facility: CLINIC | Age: 33
End: 2023-01-19
Payer: COMMERCIAL

## 2023-01-19 DIAGNOSIS — Z34.81 ENCOUNTER FOR SUPERVISION OF OTHER NORMAL PREGNANCY IN FIRST TRIMESTER: Primary | ICD-10-CM

## 2023-01-20 RX ORDER — ONDANSETRON 4 MG/1
4 TABLET, ORALLY DISINTEGRATING ORAL EVERY 8 HOURS PRN
Qty: 30 TABLET | Refills: 0 | Status: SHIPPED | OUTPATIENT
Start: 2023-01-20 | End: 2023-05-22

## 2023-01-20 NOTE — TELEPHONE ENCOUNTER
Patient responded back about Zofran. Did try B6 and Unisom but not really helping. She is wanting to try Zofran. Thank you!

## 2023-01-20 NOTE — TELEPHONE ENCOUNTER
Sent patient MyChart with provider's suggestions below. Advised her to let us know if she has tried and would like Zofran.     Kimber Andres, BSN, RN

## 2023-01-20 NOTE — TELEPHONE ENCOUNTER
Patient has first appointment scheduled with you 2/13. Would you prefer patient try home care options like Unisom or B6? Or would you like to prescribe something?    TONY FuentesN, RN

## 2023-01-20 NOTE — TELEPHONE ENCOUNTER
Can use unisom, b6, benedryl.  If she has tried zofran in the past would be happy to send her some.  Janel Gonzalez, CNP

## 2023-02-02 LAB
ABO/RH(D): NORMAL
ANTIBODY SCREEN: NEGATIVE
SPECIMEN EXPIRATION DATE: NORMAL

## 2023-02-03 ENCOUNTER — LAB (OUTPATIENT)
Dept: LAB | Facility: CLINIC | Age: 33
End: 2023-02-03
Payer: COMMERCIAL

## 2023-02-03 ENCOUNTER — HOSPITAL ENCOUNTER (OUTPATIENT)
Dept: ULTRASOUND IMAGING | Facility: CLINIC | Age: 33
Discharge: HOME | End: 2023-02-03
Attending: FAMILY MEDICINE | Admitting: FAMILY MEDICINE
Payer: COMMERCIAL

## 2023-02-03 DIAGNOSIS — Z34.90 SUPERVISION OF NORMAL PREGNANCY: ICD-10-CM

## 2023-02-03 LAB
ERYTHROCYTE [DISTWIDTH] IN BLOOD BY AUTOMATED COUNT: 14.2 % (ref 10–15)
HBV SURFACE AG SERPL QL IA: NONREACTIVE
HCT VFR BLD AUTO: 37.7 % (ref 35–47)
HCV AB SERPL QL IA: NONREACTIVE
HGB BLD-MCNC: 12.4 G/DL (ref 11.7–15.7)
HIV 1+2 AB+HIV1 P24 AG SERPL QL IA: NONREACTIVE
MCH RBC QN AUTO: 27.1 PG (ref 26.5–33)
MCHC RBC AUTO-ENTMCNC: 32.9 G/DL (ref 31.5–36.5)
MCV RBC AUTO: 83 FL (ref 78–100)
PLATELET # BLD AUTO: 232 10E3/UL (ref 150–450)
RBC # BLD AUTO: 4.57 10E6/UL (ref 3.8–5.2)
T PALLIDUM AB SER QL: NONREACTIVE
WBC # BLD AUTO: 5.9 10E3/UL (ref 4–11)

## 2023-02-03 PROCEDURE — 86900 BLOOD TYPING SEROLOGIC ABO: CPT

## 2023-02-03 PROCEDURE — 87389 HIV-1 AG W/HIV-1&-2 AB AG IA: CPT

## 2023-02-03 PROCEDURE — 36415 COLL VENOUS BLD VENIPUNCTURE: CPT

## 2023-02-03 PROCEDURE — 86803 HEPATITIS C AB TEST: CPT

## 2023-02-03 PROCEDURE — 87086 URINE CULTURE/COLONY COUNT: CPT

## 2023-02-03 PROCEDURE — 86762 RUBELLA ANTIBODY: CPT

## 2023-02-03 PROCEDURE — 86780 TREPONEMA PALLIDUM: CPT

## 2023-02-03 PROCEDURE — 87340 HEPATITIS B SURFACE AG IA: CPT

## 2023-02-03 PROCEDURE — 76801 OB US < 14 WKS SINGLE FETUS: CPT

## 2023-02-03 PROCEDURE — 86850 RBC ANTIBODY SCREEN: CPT

## 2023-02-03 PROCEDURE — 85027 COMPLETE CBC AUTOMATED: CPT

## 2023-02-03 PROCEDURE — 86901 BLOOD TYPING SEROLOGIC RH(D): CPT

## 2023-02-04 LAB — BACTERIA UR CULT: NORMAL

## 2023-02-06 LAB
RUBV IGG SERPL QL IA: 1.48 INDEX
RUBV IGG SERPL QL IA: POSITIVE

## 2023-02-13 ENCOUNTER — PRENATAL OFFICE VISIT (OUTPATIENT)
Dept: FAMILY MEDICINE | Facility: CLINIC | Age: 33
End: 2023-02-13
Payer: COMMERCIAL

## 2023-02-13 VITALS
TEMPERATURE: 98.1 F | SYSTOLIC BLOOD PRESSURE: 138 MMHG | WEIGHT: 185.2 LBS | RESPIRATION RATE: 18 BRPM | OXYGEN SATURATION: 99 % | HEART RATE: 100 BPM | HEIGHT: 66 IN | DIASTOLIC BLOOD PRESSURE: 78 MMHG | BODY MASS INDEX: 29.77 KG/M2

## 2023-02-13 DIAGNOSIS — O09.91 HIGH-RISK PREGNANCY IN FIRST TRIMESTER: Primary | ICD-10-CM

## 2023-02-13 DIAGNOSIS — Z87.59 HISTORY OF PREGNANCY INDUCED HYPERTENSION: ICD-10-CM

## 2023-02-13 PROCEDURE — 99207 PR FIRST OB VISIT: CPT | Performed by: NURSE PRACTITIONER

## 2023-02-13 ASSESSMENT — PAIN SCALES - GENERAL: PAINLEVEL: NO PAIN (0)

## 2023-02-13 NOTE — PROGRESS NOTES
HTN: patient with history of HTN with pregnancy.  Has cuff at home- is borderline today.  Will check at home and send us readings.  Recommend start baby ASA after 12 weeks.         SUBJECTIVE:     HPI:    This is a 32 year old female patient,  who presents for her first obstetrical visit.    BEVERLY: 2023, by Est. Date of Conception.  She is 9w5d weeks.  Her cycles are irregular.  Her last menstrual period was normal.   Since her LMP, she has experienced  nausea, emesis, fatigue, headache, loss of appetite, lightheadedness, hemorrhoids and constipation).   She denies abdominal pain, vaginal discharge, dysuria, pelvic pain, urinary urgency, urinary frequency and vaginal bleeding.    Additional History:     Have you travelled during the pregnancy?No  Have your sexual partner(s) travelled during the pregnancy?No    6 girl vaginal Erika 8.5  4 Felicitas- vaginal, HTN   3 Rylee- vaginal HTN     Has developed where usually gets high blood pressure- was induced last two      HISTORY:   Planned Pregnancy: Yes  Marital Status:   Occupation:   Living in Household: Spouse and Children    Past History:  Her past medical history   Past Medical History:   Diagnosis Date     Gestational hypertension 10/6/2018     Preeclampsia 10/6/2018   .      She has a history of  pregnancy induced hypertension and preeclampsia    Since her last LMP she denies use of alcohol, tobacco and street drugs.    Past medical, surgical, social and family history were reviewed and [facility].      Current Outpatient Medications   Medication     ondansetron (ZOFRAN ODT) 4 MG ODT tab     Prenatal Vit-Fe Fumarate-FA (PRENATAL MULTIVITAMIN W/IRON) 27-0.8 MG tablet     No current facility-administered medications for this visit.       ROS:   12 point review of systems negative other than symptoms noted below or in the HPI.      OBJECTIVE:     EXAM:  /78   Pulse 100   Temp 98.1  F (36.7  C) (Temporal)    "Resp 18   Ht 1.676 m (5' 6\")   Wt 84 kg (185 lb 3.2 oz)   LMP 2022   SpO2 99%   BMI 29.89 kg/m   Body mass index is 29.89 kg/m .    GENERAL: healthy, alert and no distress  EYES: Eyes grossly normal to inspection, PERRL and conjunctivae and sclerae normal  HENT: ear canals and TM's normal, nose and mouth without ulcers or lesions  NECK: no adenopathy, no asymmetry, masses, or scars and thyroid normal to palpation  RESP: lungs clear to auscultation - no rales, rhonchi or wheezes  BREAST: normal without masses, tenderness or nipple discharge and no palpable axillary masses or adenopathy  CV: regular rate and rhythm, normal S1 S2, no S3 or S4, no murmur, click or rub, no peripheral edema and peripheral pulses strong  ABDOMEN: soft, nontender, no hepatosplenomegaly, no masses and bowel sounds normal  MS: no gross musculoskeletal defects noted, no edema  SKIN: no suspicious lesions or rashes  NEURO: Normal strength and tone, mentation intact and speech normal  PSYCH: mentation appears normal, affect normal/bright    ASSESSMENT/PLAN:       ICD-10-CM    1. High-risk pregnancy in first trimester  O09.91 NEISSERIA GONORRHOEA PCR     CHLAMYDIA TRACHOMATIS PCR     US OB > 14 Weeks     Comprehensive metabolic panel (BMP + Alb, Alk Phos, ALT, AST, Total. Bili, TP)     CBC with platelets and differential     Creatinine random urine      2. History of pregnancy induced hypertension  Z87.59           32 year old , 9w5d weeks of pregnancy with BEVERLY of 2023, by Est. Date of Conception    Discussed as follows:       Routine Prenatal Care:  -Discussed genetic screening options, including sequential and quad testing/AFP, cell-free DNA as well as diagnostic testing including amniocentesis. Patient would like to think about further, will discuss next visit  -Discussed Cystic Fibrosis and SMA carrier screening, patient would like to think about further, will discuss next visit  -Discussed ordered labs and ultrasound,  "  all questions answered.      -Folder given, outlining exercise, weight gain, schedule of visits, routine and indicated ultrasounds, prenatal vitamins and childbirth education.  Desires delivery at [facility]  -Body mass index is 29 - recommend 15-25 lbs (BMI 25-29.9)     HTN: patient with history of HTN with pregnancy.  Has cuff at home- is borderline today.  Will check at home and send us readings.  Recommend start baby ASA after 12 weeks.         Return in 4 weeks for routine OB care with PCP    Demarco Gonzalez CNP                      OB History:  See under specialty history

## 2023-03-22 ENCOUNTER — MYC MEDICAL ADVICE (OUTPATIENT)
Dept: FAMILY MEDICINE | Facility: CLINIC | Age: 33
End: 2023-03-22
Payer: COMMERCIAL

## 2023-04-06 ENCOUNTER — PRENATAL OFFICE VISIT (OUTPATIENT)
Dept: FAMILY MEDICINE | Facility: CLINIC | Age: 33
End: 2023-04-06
Payer: COMMERCIAL

## 2023-04-06 VITALS
OXYGEN SATURATION: 99 % | SYSTOLIC BLOOD PRESSURE: 124 MMHG | HEIGHT: 66 IN | HEART RATE: 99 BPM | BODY MASS INDEX: 30.67 KG/M2 | WEIGHT: 190.8 LBS | TEMPERATURE: 97.6 F | RESPIRATION RATE: 18 BRPM | DIASTOLIC BLOOD PRESSURE: 68 MMHG

## 2023-04-06 DIAGNOSIS — O09.92 HIGH-RISK PREGNANCY IN SECOND TRIMESTER: Primary | ICD-10-CM

## 2023-04-06 PROCEDURE — 99207 PR PRENATAL VISIT: CPT | Performed by: NURSE PRACTITIONER

## 2023-04-06 RX ORDER — ASPIRIN 81 MG/1
81 TABLET, CHEWABLE ORAL DAILY
Qty: 90 TABLET | Refills: 0 | Status: ON HOLD | COMMUNITY
Start: 2023-04-06 | End: 2023-09-07

## 2023-04-06 ASSESSMENT — PAIN SCALES - GENERAL: PAINLEVEL: NO PAIN (0)

## 2023-04-06 NOTE — PROGRESS NOTES
Concerns today:   Doing well.  No concerns today.  No nausea/vomiting. No heartburn.  No vaginal bleeding, no contractions/severe cramping, no leakage of fluid.  No vaginal discharge. No dysuria  No headache, vision changes, lower extremity swelling, upper abdominal pain, chest pain, shortness of breath.   Discussed  screening.  She declines testing at this time.  Discussed kick counts and fetal movement.  Reportable signs and symptoms discussed.          HTN: patient with history of HTN with pregnancy.  Has cuff at home- bp in good range- continue to monitor.  On baby ASA    Follow up in 4 weeks with PCP.          Janel Gonzalez, NP

## 2023-04-24 ENCOUNTER — PRENATAL OFFICE VISIT (OUTPATIENT)
Dept: FAMILY MEDICINE | Facility: CLINIC | Age: 33
End: 2023-04-24
Payer: COMMERCIAL

## 2023-04-24 ENCOUNTER — HOSPITAL ENCOUNTER (OUTPATIENT)
Dept: ULTRASOUND IMAGING | Facility: CLINIC | Age: 33
Discharge: HOME | End: 2023-04-24
Attending: NURSE PRACTITIONER | Admitting: NURSE PRACTITIONER
Payer: COMMERCIAL

## 2023-04-24 VITALS
OXYGEN SATURATION: 98 % | HEART RATE: 86 BPM | SYSTOLIC BLOOD PRESSURE: 136 MMHG | TEMPERATURE: 97.6 F | DIASTOLIC BLOOD PRESSURE: 78 MMHG | BODY MASS INDEX: 30.85 KG/M2 | WEIGHT: 191.13 LBS

## 2023-04-24 DIAGNOSIS — O09.892 SUPERVISION OF OTHER HIGH RISK PREGNANCIES, SECOND TRIMESTER: Primary | ICD-10-CM

## 2023-04-24 DIAGNOSIS — O09.92 HIGH-RISK PREGNANCY IN SECOND TRIMESTER: ICD-10-CM

## 2023-04-24 DIAGNOSIS — Z86.39 HISTORY OF IRON DEFICIENCY: ICD-10-CM

## 2023-04-24 DIAGNOSIS — O43.192 MARGINAL INSERTION OF UMBILICAL CORD AFFECTING MANAGEMENT OF MOTHER IN SECOND TRIMESTER: ICD-10-CM

## 2023-04-24 DIAGNOSIS — Z87.59 HISTORY OF PRE-ECLAMPSIA: ICD-10-CM

## 2023-04-24 PROBLEM — D50.9 IRON DEFICIENCY ANEMIA OF PREGNANCY: Status: RESOLVED | Noted: 2020-01-06 | Resolved: 2023-04-24

## 2023-04-24 PROBLEM — O99.019 IRON DEFICIENCY ANEMIA OF PREGNANCY: Status: RESOLVED | Noted: 2020-01-06 | Resolved: 2023-04-24

## 2023-04-24 PROBLEM — O10.013 PRE-EXISTING ESSENTIAL HYPERTENSION DURING PREGNANCY IN THIRD TRIMESTER: Status: RESOLVED | Noted: 2019-08-20 | Resolved: 2023-04-24

## 2023-04-24 PROBLEM — G43.109 MIGRAINE WITH AURA AND WITHOUT STATUS MIGRAINOSUS, NOT INTRACTABLE: Status: RESOLVED | Noted: 2018-08-17 | Resolved: 2023-04-24

## 2023-04-24 PROBLEM — M54.42 ACUTE LEFT-SIDED LOW BACK PAIN WITH LEFT-SIDED SCIATICA: Status: RESOLVED | Noted: 2021-09-14 | Resolved: 2023-04-24

## 2023-04-24 PROCEDURE — 99207 PR COMPLICATED OB VISIT: CPT | Performed by: FAMILY MEDICINE

## 2023-04-24 PROCEDURE — 76805 OB US >/= 14 WKS SNGL FETUS: CPT

## 2023-04-24 NOTE — PROGRESS NOTES
Doing well overall.  No bleeding, no major concerns.   She has not needed Zofran in a long time.   She had normal routine sono today except marginal cord insertion, will recheck at 28 weeks then do BPP and growth measurement at 32 weeks. Is having a girl.  BP normal today.  She does have a history of mild preeclampsia late onset in her second pregnancy, and gestational hypertension starting the second trimester with her third pregnancy.  She is on aspirin 81 mg daily. Discussed healthy weight gain.   Will check her routine labs at 28 weeks and will also get baseline pree labs at that time.   No Tdap planned due to previous possible allergic reaction.   Discussed daily fetal movement after 22 weeks.   Will follow up in 4 weeks, sooner prn.   Melanie Bartlett MD

## 2023-05-22 ENCOUNTER — PRENATAL OFFICE VISIT (OUTPATIENT)
Dept: FAMILY MEDICINE | Facility: CLINIC | Age: 33
End: 2023-05-22
Payer: COMMERCIAL

## 2023-05-22 VITALS
DIASTOLIC BLOOD PRESSURE: 66 MMHG | WEIGHT: 193.6 LBS | SYSTOLIC BLOOD PRESSURE: 114 MMHG | TEMPERATURE: 98.3 F | HEIGHT: 65 IN | RESPIRATION RATE: 16 BRPM | BODY MASS INDEX: 32.26 KG/M2 | HEART RATE: 82 BPM | OXYGEN SATURATION: 100 %

## 2023-05-22 DIAGNOSIS — Z87.59 HISTORY OF PRE-ECLAMPSIA: ICD-10-CM

## 2023-05-22 DIAGNOSIS — O43.192 MARGINAL INSERTION OF UMBILICAL CORD AFFECTING MANAGEMENT OF MOTHER IN SECOND TRIMESTER: ICD-10-CM

## 2023-05-22 DIAGNOSIS — Z86.39 HISTORY OF IRON DEFICIENCY: ICD-10-CM

## 2023-05-22 DIAGNOSIS — O09.892 SUPERVISION OF OTHER HIGH RISK PREGNANCIES, SECOND TRIMESTER: Primary | ICD-10-CM

## 2023-05-22 PROCEDURE — 99207 PR COMPLICATED OB VISIT: CPT | Performed by: FAMILY MEDICINE

## 2023-05-22 ASSESSMENT — PAIN SCALES - GENERAL: PAINLEVEL: NO PAIN (0)

## 2023-05-22 NOTE — PROGRESS NOTES
Doing well overall.  No bleeding, no regular ctx. No HA/vision change/n/v.    Had marginal cord on routine sono, will recheck at 28 weeks and BPP with fetal growth at 32 weeks.   Is on ASA for hx pree, will check routine baseline labs next visit along with GTT. Will also get ferritin at that time due to hx iron defic.   Discussed daily fetal movement.   Discussed warning signs for preeclampsia.  Will f/u in 4 week(s) or sooner with any concern for decreased fetal movement, vaginal bleeding, fluid leak, headache, vision change, severe nausea or vomiting, abdominal pain, increased edema or other concerns.   Melanie Bartlett MD

## 2023-06-02 ENCOUNTER — HEALTH MAINTENANCE LETTER (OUTPATIENT)
Age: 33
End: 2023-06-02

## 2023-06-19 ENCOUNTER — PRENATAL OFFICE VISIT (OUTPATIENT)
Dept: FAMILY MEDICINE | Facility: CLINIC | Age: 33
End: 2023-06-19
Payer: COMMERCIAL

## 2023-06-19 ENCOUNTER — HOSPITAL ENCOUNTER (OUTPATIENT)
Dept: ULTRASOUND IMAGING | Facility: CLINIC | Age: 33
Discharge: HOME | End: 2023-06-19
Attending: FAMILY MEDICINE | Admitting: FAMILY MEDICINE
Payer: COMMERCIAL

## 2023-06-19 VITALS
WEIGHT: 199.38 LBS | BODY MASS INDEX: 33.18 KG/M2 | SYSTOLIC BLOOD PRESSURE: 110 MMHG | TEMPERATURE: 97.2 F | OXYGEN SATURATION: 100 % | DIASTOLIC BLOOD PRESSURE: 70 MMHG | HEART RATE: 88 BPM

## 2023-06-19 DIAGNOSIS — Z86.39 HISTORY OF IRON DEFICIENCY: ICD-10-CM

## 2023-06-19 DIAGNOSIS — O09.892 SUPERVISION OF OTHER HIGH RISK PREGNANCIES, SECOND TRIMESTER: Primary | ICD-10-CM

## 2023-06-19 DIAGNOSIS — O99.019 IRON DEFICIENCY ANEMIA DURING PREGNANCY: ICD-10-CM

## 2023-06-19 DIAGNOSIS — Z87.59 HISTORY OF PRE-ECLAMPSIA: ICD-10-CM

## 2023-06-19 DIAGNOSIS — O43.192 MARGINAL INSERTION OF UMBILICAL CORD AFFECTING MANAGEMENT OF MOTHER IN SECOND TRIMESTER: ICD-10-CM

## 2023-06-19 DIAGNOSIS — D50.9 IRON DEFICIENCY ANEMIA DURING PREGNANCY: ICD-10-CM

## 2023-06-19 LAB
ALBUMIN MFR UR ELPH: 10.7 MG/DL
ALBUMIN SERPL BCG-MCNC: 3.5 G/DL (ref 3.5–5.2)
ALP SERPL-CCNC: 87 U/L (ref 35–104)
ALT SERPL W P-5'-P-CCNC: 10 U/L (ref 0–50)
ANION GAP SERPL CALCULATED.3IONS-SCNC: 11 MMOL/L (ref 7–15)
AST SERPL W P-5'-P-CCNC: 16 U/L (ref 0–45)
BILIRUB SERPL-MCNC: 0.2 MG/DL
BUN SERPL-MCNC: 5.7 MG/DL (ref 6–20)
CALCIUM SERPL-MCNC: 8.4 MG/DL (ref 8.6–10)
CHLORIDE SERPL-SCNC: 104 MMOL/L (ref 98–107)
CREAT SERPL-MCNC: 0.57 MG/DL (ref 0.51–0.95)
CREAT UR-MCNC: 118.6 MG/DL
DEPRECATED HCO3 PLAS-SCNC: 21 MMOL/L (ref 22–29)
ERYTHROCYTE [DISTWIDTH] IN BLOOD BY AUTOMATED COUNT: 13.2 % (ref 10–15)
FERRITIN SERPL-MCNC: 6 NG/ML (ref 6–175)
GFR SERPL CREATININE-BSD FRML MDRD: >90 ML/MIN/1.73M2
GLUCOSE 1H P 50 G GLC PO SERPL-MCNC: 119 MG/DL (ref 70–129)
GLUCOSE SERPL-MCNC: 122 MG/DL (ref 70–99)
HCT VFR BLD AUTO: 31 % (ref 35–47)
HGB BLD-MCNC: 10 G/DL (ref 11.7–15.7)
MCH RBC QN AUTO: 27.1 PG (ref 26.5–33)
MCHC RBC AUTO-ENTMCNC: 32.3 G/DL (ref 31.5–36.5)
MCV RBC AUTO: 84 FL (ref 78–100)
PLATELET # BLD AUTO: 209 10E3/UL (ref 150–450)
POTASSIUM SERPL-SCNC: 3.4 MMOL/L (ref 3.4–5.3)
PROT SERPL-MCNC: 6.2 G/DL (ref 6.4–8.3)
PROT/CREAT 24H UR: 0.09 MG/MG CR (ref 0–0.2)
RBC # BLD AUTO: 3.69 10E6/UL (ref 3.8–5.2)
SODIUM SERPL-SCNC: 136 MMOL/L (ref 136–145)
WBC # BLD AUTO: 8.9 10E3/UL (ref 4–11)

## 2023-06-19 PROCEDURE — 85027 COMPLETE CBC AUTOMATED: CPT | Performed by: FAMILY MEDICINE

## 2023-06-19 PROCEDURE — 84156 ASSAY OF PROTEIN URINE: CPT | Performed by: FAMILY MEDICINE

## 2023-06-19 PROCEDURE — 99207 PR COMPLICATED OB VISIT: CPT | Performed by: FAMILY MEDICINE

## 2023-06-19 PROCEDURE — 86780 TREPONEMA PALLIDUM: CPT | Performed by: FAMILY MEDICINE

## 2023-06-19 PROCEDURE — 36415 COLL VENOUS BLD VENIPUNCTURE: CPT | Performed by: FAMILY MEDICINE

## 2023-06-19 PROCEDURE — 76816 OB US FOLLOW-UP PER FETUS: CPT

## 2023-06-19 PROCEDURE — 82950 GLUCOSE TEST: CPT | Performed by: FAMILY MEDICINE

## 2023-06-19 PROCEDURE — 82728 ASSAY OF FERRITIN: CPT | Performed by: FAMILY MEDICINE

## 2023-06-19 PROCEDURE — 80053 COMPREHEN METABOLIC PANEL: CPT | Performed by: FAMILY MEDICINE

## 2023-06-19 RX ORDER — ALBUTEROL SULFATE 0.83 MG/ML
2.5 SOLUTION RESPIRATORY (INHALATION)
Status: CANCELLED | OUTPATIENT
Start: 2023-06-20

## 2023-06-19 RX ORDER — EPINEPHRINE 1 MG/ML
0.3 INJECTION, SOLUTION, CONCENTRATE INTRAVENOUS EVERY 5 MIN PRN
Status: CANCELLED | OUTPATIENT
Start: 2023-06-20

## 2023-06-19 RX ORDER — METHYLPREDNISOLONE SODIUM SUCCINATE 125 MG/2ML
125 INJECTION, POWDER, LYOPHILIZED, FOR SOLUTION INTRAMUSCULAR; INTRAVENOUS
Status: CANCELLED
Start: 2023-06-20

## 2023-06-19 RX ORDER — DIPHENHYDRAMINE HYDROCHLORIDE 50 MG/ML
50 INJECTION INTRAMUSCULAR; INTRAVENOUS
Status: CANCELLED
Start: 2023-06-20

## 2023-06-19 RX ORDER — HEPARIN SODIUM (PORCINE) LOCK FLUSH IV SOLN 100 UNIT/ML 100 UNIT/ML
5 SOLUTION INTRAVENOUS
Status: CANCELLED | OUTPATIENT
Start: 2023-06-20

## 2023-06-19 RX ORDER — ALBUTEROL SULFATE 90 UG/1
1-2 AEROSOL, METERED RESPIRATORY (INHALATION)
Status: CANCELLED
Start: 2023-06-20

## 2023-06-19 RX ORDER — MEPERIDINE HYDROCHLORIDE 25 MG/ML
25 INJECTION INTRAMUSCULAR; INTRAVENOUS; SUBCUTANEOUS EVERY 30 MIN PRN
Status: CANCELLED | OUTPATIENT
Start: 2023-06-20

## 2023-06-19 RX ORDER — HEPARIN SODIUM,PORCINE 10 UNIT/ML
5-20 VIAL (ML) INTRAVENOUS DAILY PRN
Status: CANCELLED | OUTPATIENT
Start: 2023-06-20

## 2023-06-19 RX ORDER — FERROUS GLUCONATE 324(38)MG
324 TABLET ORAL
Qty: 90 TABLET | Refills: 1 | Status: ON HOLD | OUTPATIENT
Start: 2023-06-19 | End: 2023-09-07

## 2023-06-19 NOTE — PROGRESS NOTES
Doing well overall.  No bleeding, no regular ctx. Had recheck ultasound today for marginal cord, which persists.  Amniotic fluid normal.  No growth measurements taken today.  Will repeat sono with next visit and do growth at that time.  Routine labs today and also baseline pree labs pending   BP well controlled, remains on ASA.   Hgb low today, will start oral iron supplement in addition to PNV.   Discussed  labor.   Discussed warning signs for preeclampsia.  Will f/u in 2 week(s) or sooner with any concern for decreased fetal movement, vaginal bleeding, fluid leak, headache, vision change, severe nausea or vomiting, abdominal pain, increased edema or other concerns.   Melanie Bartlett MD

## 2023-06-19 NOTE — RESULT ENCOUNTER NOTE
Steffi, you passed your diabetes test all  your labs are normal for pregnancy except the iron, which is significantly low. I recommend iron infusion for you as a way to get your iron up to a safer level prior to delivery. We can do it orally  but it will take longer and may not have time for it to really make a difference if we wait.   We would have to get approval from your insurance to see if it is covered, but as long as you agree, I'll put in orders for our infusion department to contact you to set this up. In the meantime, take the iron pills and if you have questions, please let me know.   Melanie Bartlett MD

## 2023-06-20 LAB — T PALLIDUM AB SER QL: NONREACTIVE

## 2023-07-03 ENCOUNTER — PRENATAL OFFICE VISIT (OUTPATIENT)
Dept: FAMILY MEDICINE | Facility: CLINIC | Age: 33
End: 2023-07-03
Payer: COMMERCIAL

## 2023-07-03 VITALS
BODY MASS INDEX: 33.28 KG/M2 | WEIGHT: 200 LBS | TEMPERATURE: 97.2 F | OXYGEN SATURATION: 99 % | HEART RATE: 112 BPM | DIASTOLIC BLOOD PRESSURE: 68 MMHG | SYSTOLIC BLOOD PRESSURE: 118 MMHG

## 2023-07-03 DIAGNOSIS — Z87.59 HISTORY OF PRE-ECLAMPSIA: ICD-10-CM

## 2023-07-03 DIAGNOSIS — O09.893 SUPERVISION OF OTHER HIGH RISK PREGNANCIES, THIRD TRIMESTER: Primary | ICD-10-CM

## 2023-07-03 DIAGNOSIS — D50.9 IRON DEFICIENCY ANEMIA DURING PREGNANCY: ICD-10-CM

## 2023-07-03 DIAGNOSIS — O99.019 IRON DEFICIENCY ANEMIA DURING PREGNANCY: ICD-10-CM

## 2023-07-03 DIAGNOSIS — O43.193 MARGINAL INSERTION OF UMBILICAL CORD AFFECTING MANAGEMENT OF MOTHER IN THIRD TRIMESTER: ICD-10-CM

## 2023-07-03 PROBLEM — Z86.39 HISTORY OF IRON DEFICIENCY: Status: RESOLVED | Noted: 2023-04-24 | Resolved: 2023-07-03

## 2023-07-03 PROCEDURE — 99207 PR COMPLICATED OB VISIT: CPT | Performed by: FAMILY MEDICINE

## 2023-07-03 NOTE — PROGRESS NOTES
Doing well overall.  No bleeding, no regular ctx. No HA/vision change/n/v.    She had not yet gotten the message about her iron level.  She is taking iron supplement but did not know about the option for infusion until recently.  She is willing to do that and I do believe her iron level warrants it.  Infusion notified and will call her to set up.  She continues on her ASA. BP normal.   All other baseline labs were normal, passed GTT.   Discussed  labor.   Has growth ultrasound appt for next visit.   Discussed warning signs for preeclampsia.  Will f/u in 2 week(s) or sooner with any concern for decreased fetal movement, vaginal bleeding, fluid leak, headache, vision change, severe nausea or vomiting, abdominal pain, increased edema or other concerns.   Melanie Bartlett MD

## 2023-07-17 ENCOUNTER — PRENATAL OFFICE VISIT (OUTPATIENT)
Dept: FAMILY MEDICINE | Facility: CLINIC | Age: 33
End: 2023-07-17
Payer: COMMERCIAL

## 2023-07-17 ENCOUNTER — HOSPITAL ENCOUNTER (OUTPATIENT)
Dept: ULTRASOUND IMAGING | Facility: CLINIC | Age: 33
Discharge: HOME | End: 2023-07-17
Attending: FAMILY MEDICINE | Admitting: FAMILY MEDICINE
Payer: COMMERCIAL

## 2023-07-17 VITALS
WEIGHT: 202.4 LBS | DIASTOLIC BLOOD PRESSURE: 74 MMHG | BODY MASS INDEX: 33.68 KG/M2 | HEART RATE: 112 BPM | OXYGEN SATURATION: 98 % | TEMPERATURE: 98.4 F | SYSTOLIC BLOOD PRESSURE: 128 MMHG

## 2023-07-17 DIAGNOSIS — Z87.59 HISTORY OF PRE-ECLAMPSIA: ICD-10-CM

## 2023-07-17 DIAGNOSIS — O09.892 SUPERVISION OF OTHER HIGH RISK PREGNANCIES, SECOND TRIMESTER: ICD-10-CM

## 2023-07-17 DIAGNOSIS — O99.019 IRON DEFICIENCY ANEMIA DURING PREGNANCY: ICD-10-CM

## 2023-07-17 DIAGNOSIS — O43.192 MARGINAL INSERTION OF UMBILICAL CORD AFFECTING MANAGEMENT OF MOTHER IN SECOND TRIMESTER: ICD-10-CM

## 2023-07-17 DIAGNOSIS — R06.02 SOB (SHORTNESS OF BREATH): ICD-10-CM

## 2023-07-17 DIAGNOSIS — D50.9 IRON DEFICIENCY ANEMIA DURING PREGNANCY: ICD-10-CM

## 2023-07-17 DIAGNOSIS — O09.893 SUPERVISION OF OTHER HIGH RISK PREGNANCIES, THIRD TRIMESTER: Primary | ICD-10-CM

## 2023-07-17 PROCEDURE — 93000 ELECTROCARDIOGRAM COMPLETE: CPT | Performed by: FAMILY MEDICINE

## 2023-07-17 PROCEDURE — 76816 OB US FOLLOW-UP PER FETUS: CPT

## 2023-07-17 PROCEDURE — 99213 OFFICE O/P EST LOW 20 MIN: CPT | Mod: 25 | Performed by: FAMILY MEDICINE

## 2023-07-17 PROCEDURE — 99207 PR COMPLICATED OB VISIT: CPT | Performed by: FAMILY MEDICINE

## 2023-07-17 ASSESSMENT — PAIN SCALES - GENERAL: PAINLEVEL: NO PAIN (0)

## 2023-07-17 NOTE — PROGRESS NOTES
Doing ok overall, but gets SOB at rest, almost feels like something is squishing her in the chest.   On exam, lungs clear, heart RRR without murmur.   EKG obtained and shows sinus tachy consistent with vitals. O2 sat 98%. Discussed that this can be related to pregnancy, especially in setting of iron deficiency. Ferritin and Hgb both low.   Will offer iron infusions.   Has repeat sono today for growth, shows EFW 69%, 2091 g, stable marginal insertion.   BPs have been normal. Is on ASA for pree risk reduction.   Discussed  labor.  Discussed warning signs for preeclampsia.  Will f/u in 2 week(s) or sooner with any concern for decreased fetal movement, vaginal bleeding, fluid leak, headache, vision change, severe nausea or vomiting, abdominal pain, increased edema or other concerns.   Melanie Bartlett MD

## 2023-07-31 ENCOUNTER — INFUSION THERAPY VISIT (OUTPATIENT)
Dept: INFUSION THERAPY | Facility: CLINIC | Age: 33
End: 2023-07-31
Attending: FAMILY MEDICINE
Payer: COMMERCIAL

## 2023-07-31 ENCOUNTER — HOSPITAL ENCOUNTER (OUTPATIENT)
Facility: CLINIC | Age: 33
Discharge: HOME | End: 2023-07-31
Attending: FAMILY MEDICINE | Admitting: FAMILY MEDICINE
Payer: COMMERCIAL

## 2023-07-31 VITALS
DIASTOLIC BLOOD PRESSURE: 91 MMHG | SYSTOLIC BLOOD PRESSURE: 146 MMHG | HEART RATE: 114 BPM | TEMPERATURE: 97.3 F | BODY MASS INDEX: 34.48 KG/M2 | WEIGHT: 207.2 LBS | OXYGEN SATURATION: 100 % | RESPIRATION RATE: 18 BRPM

## 2023-07-31 VITALS
HEART RATE: 113 BPM | TEMPERATURE: 97.6 F | SYSTOLIC BLOOD PRESSURE: 118 MMHG | BODY MASS INDEX: 34.48 KG/M2 | OXYGEN SATURATION: 99 % | DIASTOLIC BLOOD PRESSURE: 76 MMHG | HEIGHT: 65 IN | RESPIRATION RATE: 16 BRPM

## 2023-07-31 DIAGNOSIS — O99.019 IRON DEFICIENCY ANEMIA DURING PREGNANCY: Primary | ICD-10-CM

## 2023-07-31 DIAGNOSIS — D50.9 IRON DEFICIENCY ANEMIA DURING PREGNANCY: Primary | ICD-10-CM

## 2023-07-31 PROBLEM — Z36.89 ENCOUNTER FOR TRIAGE IN PREGNANT PATIENT: Status: ACTIVE | Noted: 2023-07-31

## 2023-07-31 PROCEDURE — 250N000011 HC RX IP 250 OP 636: Mod: JZ | Performed by: FAMILY MEDICINE

## 2023-07-31 PROCEDURE — G0463 HOSPITAL OUTPT CLINIC VISIT: HCPCS

## 2023-07-31 PROCEDURE — 96365 THER/PROPH/DIAG IV INF INIT: CPT

## 2023-07-31 PROCEDURE — 258N000003 HC RX IP 258 OP 636: Performed by: FAMILY MEDICINE

## 2023-07-31 RX ORDER — ALBUTEROL SULFATE 0.83 MG/ML
2.5 SOLUTION RESPIRATORY (INHALATION)
Status: CANCELLED | OUTPATIENT
Start: 2023-08-07

## 2023-07-31 RX ORDER — METHYLPREDNISOLONE SODIUM SUCCINATE 125 MG/2ML
125 INJECTION, POWDER, LYOPHILIZED, FOR SOLUTION INTRAMUSCULAR; INTRAVENOUS
Status: CANCELLED
Start: 2023-08-07

## 2023-07-31 RX ORDER — EPINEPHRINE 1 MG/ML
0.3 INJECTION, SOLUTION INTRAMUSCULAR; SUBCUTANEOUS EVERY 5 MIN PRN
Status: CANCELLED | OUTPATIENT
Start: 2023-08-07

## 2023-07-31 RX ORDER — MEPERIDINE HYDROCHLORIDE 25 MG/ML
25 INJECTION INTRAMUSCULAR; INTRAVENOUS; SUBCUTANEOUS EVERY 30 MIN PRN
Status: CANCELLED | OUTPATIENT
Start: 2023-08-07

## 2023-07-31 RX ORDER — ALBUTEROL SULFATE 90 UG/1
1-2 AEROSOL, METERED RESPIRATORY (INHALATION)
Status: CANCELLED
Start: 2023-08-07

## 2023-07-31 RX ORDER — HEPARIN SODIUM,PORCINE 10 UNIT/ML
5-20 VIAL (ML) INTRAVENOUS DAILY PRN
Status: CANCELLED | OUTPATIENT
Start: 2023-08-07

## 2023-07-31 RX ORDER — HEPARIN SODIUM (PORCINE) LOCK FLUSH IV SOLN 100 UNIT/ML 100 UNIT/ML
5 SOLUTION INTRAVENOUS
Status: CANCELLED | OUTPATIENT
Start: 2023-08-07

## 2023-07-31 RX ORDER — DIPHENHYDRAMINE HYDROCHLORIDE 50 MG/ML
50 INJECTION INTRAMUSCULAR; INTRAVENOUS
Status: CANCELLED
Start: 2023-08-07

## 2023-07-31 RX ORDER — LIDOCAINE 40 MG/G
CREAM TOPICAL
Status: DISCONTINUED | OUTPATIENT
Start: 2023-07-31 | End: 2023-07-31 | Stop reason: HOSPADM

## 2023-07-31 RX ADMIN — FERRIC CARBOXYMALTOSE INJECTION 750 MG: 50 INJECTION, SOLUTION INTRAVENOUS at 09:27

## 2023-07-31 RX ADMIN — SODIUM CHLORIDE 250 ML: 9 INJECTION, SOLUTION INTRAVENOUS at 09:14

## 2023-07-31 ASSESSMENT — ACTIVITIES OF DAILY LIVING (ADL): ADLS_ACUITY_SCORE: 20

## 2023-07-31 NOTE — PROGRESS NOTES
S: Triage Arrival  B: Neo is a 32 y.o.  @ 33w 5d who presents with complaint of hypertension, had been in infusion therapy, had elevated blood pressures. History of gestational hypertension with two previous deliveries.   A: EFM applied. FHT's 140 with minimal variability, accels present, no decels noted on strip. Contractions pt reports occasional cramping.   R: Will notify MD to obtain further orders.

## 2023-07-31 NOTE — PROGRESS NOTES
Infusion Nursing Note:  Neo Parra presents today for Injectafer dose 1 of 2.    Patient seen by provider today: No   present during visit today: Not Applicable.    Note: Steffi is 34 weeks pregnant with 4th baby.  Has history of gestational hypertension with induction.  Today's BP were elevated from baseline.  140's/80-90's today.  Denies headache, denies vision changes.  Reflexes were normal.  Mild edema.  With change is BP, Dr Bartlett was notified by phone.  Plan is to have pt go to L&D after infusion for monitoring and blood work.  Pt in agreement with plan.      Intravenous Access:  Peripheral IV placed.    Treatment Conditions:  Not Applicable.      Post Infusion Assessment:  Patient tolerated infusion without incident.  Site patent and intact, free from redness, edema or discomfort.  No evidence of extravasations.  IV left in place since she is going to L&D for observation.       Discharge Plan:   Discharge instructions reviewed with: Patient.  Patient and/or family verbalized understanding of discharge instructions and all questions answered.  Patient discharged in stable condition accompanied by: RN  Departure Mode: Ambulatory, escorted to 3rd floor for obs.      Jerrica Granda RN

## 2023-07-31 NOTE — DISCHARGE INSTRUCTIONS
OB Triage Discharge Instructions    Diet:  Regular diet as tolerated  Drink 8-10 glasses of water and / or juice every day    Activity:  As tolerated    Other Special Instructions: Follow up with Dr. Bartlett as previously scheduled on this friday    Reason for being seen in L&D: Rule out hypertension     Treatment/procedures performed in L&D: uterine and fetal monitoring    Call the [facility] at [phone] if you have:  5 or more contractions in one hour  Leaking of fluid from your vaginal area  Decreased fetal movement (follow kick count instructions)  Bleeding from your vaginal area  Swelling in your face, or increased swelling in your hands or legs  Headaches or vision problems such as blurring or seeing spots or starts  Nausea or vomiting lasting for more than 24 hours  An increase in weight (5lbs/week)  Epigastric pain (sometimes confused with heartburn that does not go away or a very bad stomach ache)    If you have any questions, please follow up with your doctor.        Patient Signature: ______________________________________________________________  By signing the above I acknowledge that a nurse or my care provider has explained the instruction to me and I have had any questions regarding my care explained to me.        Discharge Nurse Signature: _______________________________ 7/31/2023  10:57 AM    Method of discharge: ambulatory     Accompanied by: independent  Time of discharge: 1100

## 2023-07-31 NOTE — PROGRESS NOTES
S:  Discharge from triage.   A:  FHT's 140s, Moderate variability, Accels present, no decels noted. Contractions pt reports occasional cramping.  Cervical exam not assessed.  R:  Written and verbal D/C instruction provided. Pt. Verbalized understanding of D/C instructions and will follow up with Dr. Bartlett as previously scheduled.   Verbalizes understanding that she will call or return to the [facility] with any further questions or concerns.   Pt. D/C'd via ambulatory with independent    Nursing Discharge Checklist  Discharge order entered: Yes  Patient care order entered: Yes  Charges entered: Yes  IV start and stop times have been documented in Epic?  NA  NST start and stop times have been documented in Epic Doc F/S?  NA

## 2023-08-04 ENCOUNTER — PRENATAL OFFICE VISIT (OUTPATIENT)
Dept: FAMILY MEDICINE | Facility: CLINIC | Age: 33
End: 2023-08-04
Payer: COMMERCIAL

## 2023-08-04 VITALS
WEIGHT: 207.8 LBS | SYSTOLIC BLOOD PRESSURE: 133 MMHG | BODY MASS INDEX: 34.58 KG/M2 | HEART RATE: 108 BPM | RESPIRATION RATE: 16 BRPM | DIASTOLIC BLOOD PRESSURE: 87 MMHG | OXYGEN SATURATION: 100 %

## 2023-08-04 DIAGNOSIS — Z87.59 HISTORY OF PRE-ECLAMPSIA: ICD-10-CM

## 2023-08-04 DIAGNOSIS — O99.019 IRON DEFICIENCY ANEMIA DURING PREGNANCY: ICD-10-CM

## 2023-08-04 DIAGNOSIS — O43.193 MARGINAL INSERTION OF UMBILICAL CORD AFFECTING MANAGEMENT OF MOTHER IN THIRD TRIMESTER: ICD-10-CM

## 2023-08-04 DIAGNOSIS — O09.893 SUPERVISION OF OTHER HIGH RISK PREGNANCIES, THIRD TRIMESTER: Primary | ICD-10-CM

## 2023-08-04 DIAGNOSIS — D50.9 IRON DEFICIENCY ANEMIA DURING PREGNANCY: ICD-10-CM

## 2023-08-04 PROBLEM — Z36.89 ENCOUNTER FOR TRIAGE IN PREGNANT PATIENT: Status: RESOLVED | Noted: 2023-07-31 | Resolved: 2023-08-04

## 2023-08-04 PROCEDURE — 99207 PR COMPLICATED OB VISIT: CPT | Performed by: FAMILY MEDICINE

## 2023-08-04 ASSESSMENT — PAIN SCALES - GENERAL: PAINLEVEL: NO PAIN (0)

## 2023-08-04 NOTE — PROGRESS NOTES
Doing well overall.  No bleeding, no regular ctx.   Was seen in the [facility] after last infusion due to elevated BP but all was normal, BPs were normal in [facility].   Has had mild swelling off and on, nothing worrisome. Baby active.   Has one more infusion appt.   Has hx of pree, but currently BPs all normal, no signs of pree. Is on ASA. Discussed possibility of induction around 39 weeks, will re-address at her 38 week appt or sooner prn.   Discussed  labor.p   Discussed warning signs for preeclampsia.  Will f/u in 10 days or sooner with any concern for decreased fetal movement, vaginal bleeding, fluid leak, headache, vision change, severe nausea or vomiting, abdominal pain, increased edema or other concerns.   Melanie Bartlett MD

## 2023-08-07 ENCOUNTER — INFUSION THERAPY VISIT (OUTPATIENT)
Dept: INFUSION THERAPY | Facility: CLINIC | Age: 33
End: 2023-08-07
Attending: FAMILY MEDICINE
Payer: COMMERCIAL

## 2023-08-07 VITALS
HEART RATE: 100 BPM | BODY MASS INDEX: 34.7 KG/M2 | OXYGEN SATURATION: 99 % | TEMPERATURE: 98.2 F | SYSTOLIC BLOOD PRESSURE: 127 MMHG | RESPIRATION RATE: 18 BRPM | WEIGHT: 208.5 LBS | DIASTOLIC BLOOD PRESSURE: 81 MMHG

## 2023-08-07 DIAGNOSIS — D50.9 IRON DEFICIENCY ANEMIA DURING PREGNANCY: Primary | ICD-10-CM

## 2023-08-07 DIAGNOSIS — O99.019 IRON DEFICIENCY ANEMIA DURING PREGNANCY: Primary | ICD-10-CM

## 2023-08-07 PROCEDURE — 258N000003 HC RX IP 258 OP 636: Performed by: FAMILY MEDICINE

## 2023-08-07 PROCEDURE — 96365 THER/PROPH/DIAG IV INF INIT: CPT

## 2023-08-07 PROCEDURE — 250N000011 HC RX IP 250 OP 636: Mod: JZ | Performed by: FAMILY MEDICINE

## 2023-08-07 RX ORDER — HEPARIN SODIUM (PORCINE) LOCK FLUSH IV SOLN 100 UNIT/ML 100 UNIT/ML
5 SOLUTION INTRAVENOUS
Status: CANCELLED | OUTPATIENT
Start: 2023-08-07

## 2023-08-07 RX ORDER — METHYLPREDNISOLONE SODIUM SUCCINATE 125 MG/2ML
125 INJECTION, POWDER, LYOPHILIZED, FOR SOLUTION INTRAMUSCULAR; INTRAVENOUS
Status: CANCELLED
Start: 2023-08-07

## 2023-08-07 RX ORDER — DIPHENHYDRAMINE HYDROCHLORIDE 50 MG/ML
50 INJECTION INTRAMUSCULAR; INTRAVENOUS
Status: CANCELLED
Start: 2023-08-07

## 2023-08-07 RX ORDER — HEPARIN SODIUM,PORCINE 10 UNIT/ML
5-20 VIAL (ML) INTRAVENOUS DAILY PRN
Status: CANCELLED | OUTPATIENT
Start: 2023-08-07

## 2023-08-07 RX ORDER — ALBUTEROL SULFATE 0.83 MG/ML
2.5 SOLUTION RESPIRATORY (INHALATION)
Status: CANCELLED | OUTPATIENT
Start: 2023-08-07

## 2023-08-07 RX ORDER — MEPERIDINE HYDROCHLORIDE 25 MG/ML
25 INJECTION INTRAMUSCULAR; INTRAVENOUS; SUBCUTANEOUS EVERY 30 MIN PRN
Status: CANCELLED | OUTPATIENT
Start: 2023-08-07

## 2023-08-07 RX ORDER — EPINEPHRINE 1 MG/ML
0.3 INJECTION, SOLUTION INTRAMUSCULAR; SUBCUTANEOUS EVERY 5 MIN PRN
Status: CANCELLED | OUTPATIENT
Start: 2023-08-07

## 2023-08-07 RX ORDER — ALBUTEROL SULFATE 90 UG/1
1-2 AEROSOL, METERED RESPIRATORY (INHALATION)
Status: CANCELLED
Start: 2023-08-07

## 2023-08-07 RX ADMIN — FERRIC CARBOXYMALTOSE INJECTION 750 MG: 50 INJECTION, SOLUTION INTRAVENOUS at 09:32

## 2023-08-07 RX ADMIN — SODIUM CHLORIDE 250 ML: 9 INJECTION, SOLUTION INTRAVENOUS at 09:13

## 2023-08-07 ASSESSMENT — PAIN SCALES - GENERAL: PAINLEVEL: NO PAIN (0)

## 2023-08-07 NOTE — PROGRESS NOTES
Infusion Nursing Note:  Neo Parra presents today for final dose of injectafer.    Patient seen by provider today: No   present during visit today: Not Applicable.    Note: BP wnl today. Steffi is feeling good.  Eager for delivery.      Intravenous Access:  Peripheral IV placed.    Treatment Conditions:  Not Applicable.      Post Infusion Assessment:  Patient tolerated infusion without incident.  No evidence of extravasations.  Access discontinued per protocol.       Discharge Plan:   Discharge instructions reviewed with: Patient.  Patient and/or family verbalized understanding of discharge instructions and all questions answered.  Patient discharged in stable condition accompanied by: self.  Departure Mode: Ambulatory.      Jerrica Granda RN

## 2023-08-14 ENCOUNTER — PRENATAL OFFICE VISIT (OUTPATIENT)
Dept: FAMILY MEDICINE | Facility: CLINIC | Age: 33
End: 2023-08-14
Payer: COMMERCIAL

## 2023-08-14 VITALS
TEMPERATURE: 97.5 F | OXYGEN SATURATION: 100 % | BODY MASS INDEX: 34.66 KG/M2 | HEIGHT: 65 IN | HEART RATE: 125 BPM | WEIGHT: 208 LBS | DIASTOLIC BLOOD PRESSURE: 82 MMHG | SYSTOLIC BLOOD PRESSURE: 134 MMHG | RESPIRATION RATE: 18 BRPM

## 2023-08-14 DIAGNOSIS — O99.019 IRON DEFICIENCY ANEMIA DURING PREGNANCY: ICD-10-CM

## 2023-08-14 DIAGNOSIS — O09.893 SUPERVISION OF OTHER HIGH RISK PREGNANCIES, THIRD TRIMESTER: Primary | ICD-10-CM

## 2023-08-14 DIAGNOSIS — O43.193 MARGINAL INSERTION OF UMBILICAL CORD AFFECTING MANAGEMENT OF MOTHER IN THIRD TRIMESTER: ICD-10-CM

## 2023-08-14 DIAGNOSIS — D50.9 IRON DEFICIENCY ANEMIA DURING PREGNANCY: ICD-10-CM

## 2023-08-14 DIAGNOSIS — Z87.59 HISTORY OF PRE-ECLAMPSIA: ICD-10-CM

## 2023-08-14 PROCEDURE — 87653 STREP B DNA AMP PROBE: CPT | Performed by: FAMILY MEDICINE

## 2023-08-14 PROCEDURE — 99207 PR COMPLICATED OB VISIT: CPT | Performed by: FAMILY MEDICINE

## 2023-08-14 ASSESSMENT — PAIN SCALES - GENERAL: PAINLEVEL: NO PAIN (0)

## 2023-08-15 LAB — GP B STREP DNA SPEC QL NAA+PROBE: NEGATIVE

## 2023-08-16 NOTE — PROGRESS NOTES
Doing well overall.  No bleeding, no regular ctx, having lots of random ones. No HA/vision change/n/v.   Will need repeat growth ultrasound next week due to history of pree and marginal cord.   GBS sent.   Discussed when to present for signs and symptoms of labor.   Discussed warning signs for preeclampsia.  Will f/u in 1 week(s) or sooner with any concern for decreased fetal movement, vaginal bleeding, fluid leak, headache, vision change, severe nausea or vomiting, abdominal pain, increased edema or other concerns.   Melanie Bartlett MD

## 2023-08-18 ENCOUNTER — HOSPITAL ENCOUNTER (OUTPATIENT)
Dept: ULTRASOUND IMAGING | Facility: CLINIC | Age: 33
Discharge: HOME | End: 2023-08-18
Attending: FAMILY MEDICINE | Admitting: FAMILY MEDICINE
Payer: COMMERCIAL

## 2023-08-18 DIAGNOSIS — O43.192 MARGINAL INSERTION OF UMBILICAL CORD AFFECTING MANAGEMENT OF MOTHER IN SECOND TRIMESTER: ICD-10-CM

## 2023-08-18 PROCEDURE — 76816 OB US FOLLOW-UP PER FETUS: CPT

## 2023-08-21 ENCOUNTER — PRENATAL OFFICE VISIT (OUTPATIENT)
Dept: FAMILY MEDICINE | Facility: CLINIC | Age: 33
End: 2023-08-21
Payer: COMMERCIAL

## 2023-08-21 VITALS
HEIGHT: 65 IN | DIASTOLIC BLOOD PRESSURE: 76 MMHG | BODY MASS INDEX: 34.82 KG/M2 | SYSTOLIC BLOOD PRESSURE: 132 MMHG | WEIGHT: 209 LBS | TEMPERATURE: 97.6 F | HEART RATE: 102 BPM | OXYGEN SATURATION: 99 %

## 2023-08-21 DIAGNOSIS — O09.893 SUPERVISION OF OTHER HIGH RISK PREGNANCIES, THIRD TRIMESTER: Primary | ICD-10-CM

## 2023-08-21 DIAGNOSIS — O43.193 MARGINAL INSERTION OF UMBILICAL CORD AFFECTING MANAGEMENT OF MOTHER IN THIRD TRIMESTER: ICD-10-CM

## 2023-08-21 DIAGNOSIS — O99.019 IRON DEFICIENCY ANEMIA DURING PREGNANCY: ICD-10-CM

## 2023-08-21 DIAGNOSIS — D50.9 IRON DEFICIENCY ANEMIA DURING PREGNANCY: ICD-10-CM

## 2023-08-21 DIAGNOSIS — Z87.59 HISTORY OF PRE-ECLAMPSIA: ICD-10-CM

## 2023-08-21 PROCEDURE — 99207 PR PRENATAL VISIT: CPT | Performed by: FAMILY MEDICINE

## 2023-08-21 ASSESSMENT — PAIN SCALES - GENERAL: PAINLEVEL: NO PAIN (0)

## 2023-08-21 NOTE — PROGRESS NOTES
Concerns: no concerns at this time.  {OB36:[id number]}  GBS done today.  Labor precautions discussed.  RTC 1 week.  Prenatal flowsheet information is reviewed.    Mohit jA Mai, MD

## 2023-08-21 NOTE — PROGRESS NOTES
"Chart reviewed, good prenatal care with no complication.  High risk OB due to history of preeclampsia.  Blood pressure has been normal.  No preeclamptic symptoms.  Marginal cord insert, growth ultrasound last week reviewed, normal with estimated fetal weight of 60th percentile.  Biophysical profile was 8 out of 8.  Discussed the ultrasound result with patient.    Last week group B strep was negative.      She is allergic to Tdap.  Continue to declined COVID vaccination.      Encouraged to continue with aspirin until 38 weeks.  I had a long conversation with her about preeclamptic symptoms and symptoms to be seen to call in.  Also encouraged to monitor her blood pressure when she has symptoms of preeclampsia.      Overall, doing well.  No concerns today.   Normal fetal movement  Rare, sporadic, nonpainful contraction without increased pelvic pressure or cramping  No vaginal bleeding, abnormal discharge, or leakage.  No HA, abdominal pain, N/V, visual changes, or leg swelling  No concerns of mental health or safety.     Prenatal flowsheet information is reviewed.      /76   Pulse 102   Temp 97.6  F (36.4  C) (Temporal)   Ht 1.651 m (5' 5\")   Wt 94.8 kg (209 lb)   LMP 11/30/2022   SpO2 99%   BMI 34.78 kg/m          Cervix: Deferred  Cephalic presentation appreciated on the exam today.  Continue with prenatal vitamin, aspirin and iron sulfate   Encouraged to drink a lot of water  Discussed signs of labor and when to call or come in.  Discussed kick counts and fetal movement.  Reportable signs and symptoms discussed.  Preeclamptic symptoms discussed as well  Group B strep negative  RTC 1 week with Dr. Bartlett as scheduled, earlier as needed      Mohit Aj Mai, MD   "

## 2023-08-28 ENCOUNTER — PRENATAL OFFICE VISIT (OUTPATIENT)
Dept: FAMILY MEDICINE | Facility: CLINIC | Age: 33
End: 2023-08-28
Payer: COMMERCIAL

## 2023-08-28 VITALS
HEIGHT: 65 IN | WEIGHT: 210.13 LBS | SYSTOLIC BLOOD PRESSURE: 130 MMHG | TEMPERATURE: 97.7 F | RESPIRATION RATE: 18 BRPM | BODY MASS INDEX: 35.01 KG/M2 | HEART RATE: 110 BPM | DIASTOLIC BLOOD PRESSURE: 80 MMHG | OXYGEN SATURATION: 100 %

## 2023-08-28 DIAGNOSIS — O43.193 MARGINAL INSERTION OF UMBILICAL CORD AFFECTING MANAGEMENT OF MOTHER IN THIRD TRIMESTER: ICD-10-CM

## 2023-08-28 DIAGNOSIS — O09.893 SUPERVISION OF OTHER HIGH RISK PREGNANCIES, THIRD TRIMESTER: Primary | ICD-10-CM

## 2023-08-28 DIAGNOSIS — Z87.59 HISTORY OF PRE-ECLAMPSIA: ICD-10-CM

## 2023-08-28 DIAGNOSIS — O99.019 IRON DEFICIENCY ANEMIA DURING PREGNANCY: ICD-10-CM

## 2023-08-28 DIAGNOSIS — D50.9 IRON DEFICIENCY ANEMIA DURING PREGNANCY: ICD-10-CM

## 2023-08-28 LAB
ALBUMIN MFR UR ELPH: 6.1 MG/DL
ALBUMIN SERPL BCG-MCNC: 3.8 G/DL (ref 3.5–5.2)
ALP SERPL-CCNC: 143 U/L (ref 35–104)
ALT SERPL W P-5'-P-CCNC: 9 U/L (ref 0–50)
ANION GAP SERPL CALCULATED.3IONS-SCNC: 13 MMOL/L (ref 7–15)
AST SERPL W P-5'-P-CCNC: 20 U/L (ref 0–45)
BILIRUB SERPL-MCNC: 0.5 MG/DL
BUN SERPL-MCNC: 3.4 MG/DL (ref 6–20)
CALCIUM SERPL-MCNC: 9.3 MG/DL (ref 8.6–10)
CHLORIDE SERPL-SCNC: 103 MMOL/L (ref 98–107)
CREAT SERPL-MCNC: 0.59 MG/DL (ref 0.51–0.95)
CREAT UR-MCNC: 49.3 MG/DL
DEPRECATED HCO3 PLAS-SCNC: 22 MMOL/L (ref 22–29)
ERYTHROCYTE [DISTWIDTH] IN BLOOD BY AUTOMATED COUNT: 20.6 % (ref 10–15)
GFR SERPL CREATININE-BSD FRML MDRD: >90 ML/MIN/1.73M2
GLUCOSE SERPL-MCNC: 75 MG/DL (ref 70–99)
HCT VFR BLD AUTO: 38 % (ref 35–47)
HGB BLD-MCNC: 12.2 G/DL (ref 11.7–15.7)
MCH RBC QN AUTO: 27.5 PG (ref 26.5–33)
MCHC RBC AUTO-ENTMCNC: 32.1 G/DL (ref 31.5–36.5)
MCV RBC AUTO: 86 FL (ref 78–100)
PLATELET # BLD AUTO: 170 10E3/UL (ref 150–450)
POTASSIUM SERPL-SCNC: 3.5 MMOL/L (ref 3.4–5.3)
PROT SERPL-MCNC: 6.4 G/DL (ref 6.4–8.3)
PROT/CREAT 24H UR: 0.12 MG/MG CR (ref 0–0.2)
RBC # BLD AUTO: 4.43 10E6/UL (ref 3.8–5.2)
SODIUM SERPL-SCNC: 138 MMOL/L (ref 136–145)
WBC # BLD AUTO: 7.5 10E3/UL (ref 4–11)

## 2023-08-28 PROCEDURE — 99207 PR COMPLICATED OB VISIT: CPT | Performed by: FAMILY MEDICINE

## 2023-08-28 PROCEDURE — 80053 COMPREHEN METABOLIC PANEL: CPT | Performed by: FAMILY MEDICINE

## 2023-08-28 PROCEDURE — 84156 ASSAY OF PROTEIN URINE: CPT | Performed by: FAMILY MEDICINE

## 2023-08-28 PROCEDURE — 36415 COLL VENOUS BLD VENIPUNCTURE: CPT | Performed by: FAMILY MEDICINE

## 2023-08-28 PROCEDURE — 85027 COMPLETE CBC AUTOMATED: CPT | Performed by: FAMILY MEDICINE

## 2023-08-28 RX ORDER — NALOXONE HYDROCHLORIDE 0.4 MG/ML
0.4 INJECTION, SOLUTION INTRAMUSCULAR; INTRAVENOUS; SUBCUTANEOUS
Status: CANCELLED | OUTPATIENT
Start: 2023-08-28

## 2023-08-28 RX ORDER — ACETAMINOPHEN 325 MG/1
650 TABLET ORAL EVERY 4 HOURS PRN
Status: CANCELLED | OUTPATIENT
Start: 2023-08-28

## 2023-08-28 RX ORDER — CITRIC ACID/SODIUM CITRATE 334-500MG
30 SOLUTION, ORAL ORAL
Status: CANCELLED | OUTPATIENT
Start: 2023-08-28

## 2023-08-28 RX ORDER — OXYTOCIN/0.9 % SODIUM CHLORIDE 30/500 ML
340 PLASTIC BAG, INJECTION (ML) INTRAVENOUS CONTINUOUS PRN
Status: CANCELLED | OUTPATIENT
Start: 2023-08-28

## 2023-08-28 RX ORDER — KETOROLAC TROMETHAMINE 30 MG/ML
30 INJECTION, SOLUTION INTRAMUSCULAR; INTRAVENOUS
Status: CANCELLED | OUTPATIENT
Start: 2023-08-28 | End: 2023-09-02

## 2023-08-28 RX ORDER — TRANEXAMIC ACID 10 MG/ML
1 INJECTION, SOLUTION INTRAVENOUS EVERY 30 MIN PRN
Status: CANCELLED | OUTPATIENT
Start: 2023-08-28

## 2023-08-28 RX ORDER — IBUPROFEN 200 MG
800 TABLET ORAL
Status: CANCELLED | OUTPATIENT
Start: 2023-08-28 | End: 2023-09-02

## 2023-08-28 RX ORDER — METOCLOPRAMIDE HYDROCHLORIDE 5 MG/ML
10 INJECTION INTRAMUSCULAR; INTRAVENOUS EVERY 6 HOURS PRN
Status: CANCELLED | OUTPATIENT
Start: 2023-08-28

## 2023-08-28 RX ORDER — ONDANSETRON 4 MG/1
4 TABLET, ORALLY DISINTEGRATING ORAL EVERY 6 HOURS PRN
Status: CANCELLED | OUTPATIENT
Start: 2023-08-28

## 2023-08-28 RX ORDER — MISOPROSTOL 100 UG/1
25 TABLET ORAL EVERY 4 HOURS PRN
Status: CANCELLED | OUTPATIENT
Start: 2023-08-28

## 2023-08-28 RX ORDER — NALOXONE HYDROCHLORIDE 0.4 MG/ML
0.2 INJECTION, SOLUTION INTRAMUSCULAR; INTRAVENOUS; SUBCUTANEOUS
Status: CANCELLED | OUTPATIENT
Start: 2023-08-28

## 2023-08-28 RX ORDER — CARBOPROST TROMETHAMINE 250 UG/ML
250 INJECTION, SOLUTION INTRAMUSCULAR
Status: CANCELLED | OUTPATIENT
Start: 2023-08-28

## 2023-08-28 RX ORDER — OXYTOCIN 10 [USP'U]/ML
10 INJECTION, SOLUTION INTRAMUSCULAR; INTRAVENOUS
Status: CANCELLED | OUTPATIENT
Start: 2023-08-28

## 2023-08-28 RX ORDER — FENTANYL CITRATE 50 UG/ML
100 INJECTION, SOLUTION INTRAMUSCULAR; INTRAVENOUS
Status: CANCELLED | OUTPATIENT
Start: 2023-08-28

## 2023-08-28 RX ORDER — LIDOCAINE 40 MG/G
CREAM TOPICAL
Status: CANCELLED | OUTPATIENT
Start: 2023-08-28

## 2023-08-28 RX ORDER — HYDROXYZINE HYDROCHLORIDE 50 MG/1
50 TABLET, FILM COATED ORAL
Status: CANCELLED | OUTPATIENT
Start: 2023-08-28

## 2023-08-28 RX ORDER — PROCHLORPERAZINE MALEATE 5 MG
10 TABLET ORAL EVERY 6 HOURS PRN
Status: CANCELLED | OUTPATIENT
Start: 2023-08-28

## 2023-08-28 RX ORDER — OXYTOCIN/0.9 % SODIUM CHLORIDE 30/500 ML
100-340 PLASTIC BAG, INJECTION (ML) INTRAVENOUS CONTINUOUS PRN
Status: CANCELLED | OUTPATIENT
Start: 2023-08-28

## 2023-08-28 RX ORDER — SODIUM CHLORIDE, SODIUM LACTATE, POTASSIUM CHLORIDE, CALCIUM CHLORIDE 600; 310; 30; 20 MG/100ML; MG/100ML; MG/100ML; MG/100ML
INJECTION, SOLUTION INTRAVENOUS CONTINUOUS PRN
Status: CANCELLED | OUTPATIENT
Start: 2023-08-28

## 2023-08-28 RX ORDER — METOCLOPRAMIDE 5 MG/1
10 TABLET ORAL EVERY 6 HOURS PRN
Status: CANCELLED | OUTPATIENT
Start: 2023-08-28

## 2023-08-28 RX ORDER — ONDANSETRON 2 MG/ML
4 INJECTION INTRAMUSCULAR; INTRAVENOUS EVERY 6 HOURS PRN
Status: CANCELLED | OUTPATIENT
Start: 2023-08-28

## 2023-08-28 RX ORDER — PROCHLORPERAZINE 25 MG
25 SUPPOSITORY, RECTAL RECTAL EVERY 12 HOURS PRN
Status: CANCELLED | OUTPATIENT
Start: 2023-08-28

## 2023-08-28 RX ORDER — TERBUTALINE SULFATE 1 MG/ML
0.25 INJECTION, SOLUTION SUBCUTANEOUS
Status: CANCELLED | OUTPATIENT
Start: 2023-08-28

## 2023-08-28 RX ORDER — METHYLERGONOVINE MALEATE 0.2 MG/ML
200 INJECTION INTRAVENOUS
Status: CANCELLED | OUTPATIENT
Start: 2023-08-28

## 2023-08-28 RX ORDER — MORPHINE SULFATE 10 MG/ML
10 INJECTION, SOLUTION INTRAMUSCULAR; INTRAVENOUS
Status: CANCELLED | OUTPATIENT
Start: 2023-08-28

## 2023-08-28 RX ORDER — MISOPROSTOL 200 UG/1
400 TABLET ORAL
Status: CANCELLED | OUTPATIENT
Start: 2023-08-28

## 2023-08-28 RX ORDER — OXYTOCIN/0.9 % SODIUM CHLORIDE 30/500 ML
1-24 PLASTIC BAG, INJECTION (ML) INTRAVENOUS CONTINUOUS
Status: CANCELLED | OUTPATIENT
Start: 2023-08-28

## 2023-08-28 NOTE — PROGRESS NOTES
Doing well overall.  No bleeding, no regular ctx. No HA/vision change/n/v.   Her BP was initially elevated, recheck is normal at 130/80. Will check pree labs today due to history of pree in prior pregnancy.   Also with marginal cord insertion, recommendation is to induce at 39 weeks.   She is tentatively scheduled for 9/6/23. Will see her next Tuesday to check cervix and discuss whether or not cervical ripening will be needed vs pitocin induction.   Will stop ASA this Wednesday at 38 weeks.   Discussed when to present for signs and symptoms of labor.   Discussed warning signs for preeclampsia.  Will f/u sooner with any concern for decreased fetal movement, vaginal bleeding, fluid leak, headache, vision change, severe nausea or vomiting, abdominal pain, increased edema or other concerns.   Melanie Bartlett MD

## 2023-08-28 NOTE — RESULT ENCOUNTER NOTE
Steffi, your labs are all normal (for pregnancy). No sign of preeclampsia. We'll keep our plans for next week as we discussed.   Melanie Bartlett MD

## 2023-08-29 ENCOUNTER — MYC MEDICAL ADVICE (OUTPATIENT)
Dept: FAMILY MEDICINE | Facility: CLINIC | Age: 33
End: 2023-08-29
Payer: COMMERCIAL

## 2023-09-05 ENCOUNTER — PRENATAL OFFICE VISIT (OUTPATIENT)
Dept: FAMILY MEDICINE | Facility: CLINIC | Age: 33
End: 2023-09-05
Payer: COMMERCIAL

## 2023-09-05 VITALS
WEIGHT: 212.25 LBS | TEMPERATURE: 98.5 F | HEART RATE: 110 BPM | SYSTOLIC BLOOD PRESSURE: 132 MMHG | RESPIRATION RATE: 16 BRPM | BODY MASS INDEX: 34.11 KG/M2 | HEIGHT: 66 IN | OXYGEN SATURATION: 99 % | DIASTOLIC BLOOD PRESSURE: 82 MMHG

## 2023-09-05 DIAGNOSIS — D50.9 IRON DEFICIENCY ANEMIA DURING PREGNANCY: ICD-10-CM

## 2023-09-05 DIAGNOSIS — O09.893 SUPERVISION OF OTHER HIGH RISK PREGNANCIES, THIRD TRIMESTER: Primary | ICD-10-CM

## 2023-09-05 DIAGNOSIS — Z87.59 HISTORY OF PRE-ECLAMPSIA: ICD-10-CM

## 2023-09-05 DIAGNOSIS — O43.193 MARGINAL INSERTION OF UMBILICAL CORD AFFECTING MANAGEMENT OF MOTHER IN THIRD TRIMESTER: ICD-10-CM

## 2023-09-05 DIAGNOSIS — O99.019 IRON DEFICIENCY ANEMIA DURING PREGNANCY: ICD-10-CM

## 2023-09-05 PROCEDURE — 99207 PR COMPLICATED OB VISIT: CPT | Performed by: FAMILY MEDICINE

## 2023-09-05 ASSESSMENT — PAIN SCALES - GENERAL: PAINLEVEL: NO PAIN (0)

## 2023-09-05 NOTE — PROGRESS NOTES
Doing well overall.  No bleeding, no regular ctx. Just lots of BH. No leak, No HA/vision change/n/v.    Some swelling but improving, thinks due to heat.   She is on for induction tomorrow, copeland score 10 so no cervical ripening needed.   Given instructions on arrival time, etc. She feels good about plan, I expect it to go quickly based on her history and current exam.   Discussed when to present for signs and symptoms of labor today if happens sooner.   Reviewed warning signs for preeclampsia.  Will f/unit(s) sooner with any concern for decreased fetal movement, vaginal bleeding, fluid leak, headache, vision change, severe nausea or vomiting, abdominal pain, increased edema or other concerns.   Melanie Bartlett MD

## 2023-09-06 ENCOUNTER — MEDICAL CORRESPONDENCE (OUTPATIENT)
Dept: HEALTH INFORMATION MANAGEMENT | Facility: CLINIC | Age: 33
End: 2023-09-06

## 2023-09-06 ENCOUNTER — HOSPITAL ENCOUNTER (INPATIENT)
Facility: CLINIC | Age: 33
LOS: 1 days | Discharge: HOME | End: 2023-09-07
Attending: FAMILY MEDICINE | Admitting: FAMILY MEDICINE
Payer: COMMERCIAL

## 2023-09-06 ENCOUNTER — ANESTHESIA EVENT (OUTPATIENT)
Dept: OBGYN | Facility: CLINIC | Age: 33
End: 2023-09-06
Payer: COMMERCIAL

## 2023-09-06 ENCOUNTER — ANESTHESIA (OUTPATIENT)
Dept: OBGYN | Facility: CLINIC | Age: 33
End: 2023-09-06
Payer: COMMERCIAL

## 2023-09-06 LAB
ABO/RH(D): NORMAL
ANTIBODY SCREEN: NEGATIVE
ERYTHROCYTE [DISTWIDTH] IN BLOOD BY AUTOMATED COUNT: 20.1 % (ref 10–15)
HCT VFR BLD AUTO: 35.3 % (ref 35–47)
HGB BLD-MCNC: 12 G/DL (ref 11.7–15.7)
MCH RBC QN AUTO: 28.4 PG (ref 26.5–33)
MCHC RBC AUTO-ENTMCNC: 34 G/DL (ref 31.5–36.5)
MCV RBC AUTO: 84 FL (ref 78–100)
PLATELET # BLD AUTO: 163 10E3/UL (ref 150–450)
RBC # BLD AUTO: 4.23 10E6/UL (ref 3.8–5.2)
SPECIMEN EXPIRATION DATE: NORMAL
T PALLIDUM AB SER QL: NONREACTIVE
WBC # BLD AUTO: 8.9 10E3/UL (ref 4–11)

## 2023-09-06 PROCEDURE — 00HU33Z INSERTION OF INFUSION DEVICE INTO SPINAL CANAL, PERCUTANEOUS APPROACH: ICD-10-PCS | Performed by: NURSE ANESTHETIST, CERTIFIED REGISTERED

## 2023-09-06 PROCEDURE — 722N000001 HC LABOR CARE VAGINAL DELIVERY SINGLE

## 2023-09-06 PROCEDURE — 3E033VJ INTRODUCTION OF OTHER HORMONE INTO PERIPHERAL VEIN, PERCUTANEOUS APPROACH: ICD-10-PCS | Performed by: FAMILY MEDICINE

## 2023-09-06 PROCEDURE — 59400 OBSTETRICAL CARE: CPT | Performed by: FAMILY MEDICINE

## 2023-09-06 PROCEDURE — 120N000001 HC R&B MED SURG/OB

## 2023-09-06 PROCEDURE — 10907ZC DRAINAGE OF AMNIOTIC FLUID, THERAPEUTIC FROM PRODUCTS OF CONCEPTION, VIA NATURAL OR ARTIFICIAL OPENING: ICD-10-PCS | Performed by: FAMILY MEDICINE

## 2023-09-06 PROCEDURE — 250N000011 HC RX IP 250 OP 636: Performed by: NURSE ANESTHETIST, CERTIFIED REGISTERED

## 2023-09-06 PROCEDURE — 86780 TREPONEMA PALLIDUM: CPT | Performed by: FAMILY MEDICINE

## 2023-09-06 PROCEDURE — 250N000009 HC RX 250: Performed by: FAMILY MEDICINE

## 2023-09-06 PROCEDURE — 85027 COMPLETE CBC AUTOMATED: CPT | Performed by: FAMILY MEDICINE

## 2023-09-06 PROCEDURE — 258N000003 HC RX IP 258 OP 636: Performed by: FAMILY MEDICINE

## 2023-09-06 PROCEDURE — 250N000009 HC RX 250: Performed by: NURSE ANESTHETIST, CERTIFIED REGISTERED

## 2023-09-06 PROCEDURE — 370N000003 HC ANESTHESIA WARD SERVICE: Performed by: NURSE ANESTHETIST, CERTIFIED REGISTERED

## 2023-09-06 PROCEDURE — 86850 RBC ANTIBODY SCREEN: CPT | Performed by: FAMILY MEDICINE

## 2023-09-06 PROCEDURE — 250N000011 HC RX IP 250 OP 636: Mod: JZ | Performed by: NURSE ANESTHETIST, CERTIFIED REGISTERED

## 2023-09-06 PROCEDURE — 3E0R3BZ INTRODUCTION OF ANESTHETIC AGENT INTO SPINAL CANAL, PERCUTANEOUS APPROACH: ICD-10-PCS | Performed by: NURSE ANESTHETIST, CERTIFIED REGISTERED

## 2023-09-06 PROCEDURE — 250N000013 HC RX MED GY IP 250 OP 250 PS 637: Performed by: FAMILY MEDICINE

## 2023-09-06 RX ORDER — CARBOPROST TROMETHAMINE 250 UG/ML
250 INJECTION, SOLUTION INTRAMUSCULAR
Status: DISCONTINUED | OUTPATIENT
Start: 2023-09-06 | End: 2023-09-06 | Stop reason: HOSPADM

## 2023-09-06 RX ORDER — SODIUM CHLORIDE, SODIUM LACTATE, POTASSIUM CHLORIDE, CALCIUM CHLORIDE 600; 310; 30; 20 MG/100ML; MG/100ML; MG/100ML; MG/100ML
INJECTION, SOLUTION INTRAVENOUS CONTINUOUS PRN
Status: DISCONTINUED | OUTPATIENT
Start: 2023-09-06 | End: 2023-09-06 | Stop reason: HOSPADM

## 2023-09-06 RX ORDER — FERROUS GLUCONATE 324(38)MG
324 TABLET ORAL
Status: DISCONTINUED | OUTPATIENT
Start: 2023-09-07 | End: 2023-09-07 | Stop reason: HOSPADM

## 2023-09-06 RX ORDER — ACETAMINOPHEN 325 MG/1
650 TABLET ORAL EVERY 4 HOURS PRN
Status: DISCONTINUED | OUTPATIENT
Start: 2023-09-06 | End: 2023-09-06 | Stop reason: HOSPADM

## 2023-09-06 RX ORDER — OXYTOCIN 10 [USP'U]/ML
10 INJECTION, SOLUTION INTRAMUSCULAR; INTRAVENOUS
Status: DISCONTINUED | OUTPATIENT
Start: 2023-09-06 | End: 2023-09-06 | Stop reason: HOSPADM

## 2023-09-06 RX ORDER — KETOROLAC TROMETHAMINE 30 MG/ML
30 INJECTION, SOLUTION INTRAMUSCULAR; INTRAVENOUS
Status: DISCONTINUED | OUTPATIENT
Start: 2023-09-06 | End: 2023-09-07

## 2023-09-06 RX ORDER — OXYTOCIN 10 [USP'U]/ML
10 INJECTION, SOLUTION INTRAMUSCULAR; INTRAVENOUS
Status: DISCONTINUED | OUTPATIENT
Start: 2023-09-06 | End: 2023-09-07 | Stop reason: HOSPADM

## 2023-09-06 RX ORDER — PROCHLORPERAZINE 25 MG
25 SUPPOSITORY, RECTAL RECTAL EVERY 12 HOURS PRN
Status: DISCONTINUED | OUTPATIENT
Start: 2023-09-06 | End: 2023-09-06 | Stop reason: HOSPADM

## 2023-09-06 RX ORDER — IBUPROFEN 800 MG/1
800 TABLET, FILM COATED ORAL EVERY 6 HOURS PRN
Status: DISCONTINUED | OUTPATIENT
Start: 2023-09-06 | End: 2023-09-07 | Stop reason: HOSPADM

## 2023-09-06 RX ORDER — LIDOCAINE 40 MG/G
CREAM TOPICAL
Status: DISCONTINUED | OUTPATIENT
Start: 2023-09-06 | End: 2023-09-06 | Stop reason: HOSPADM

## 2023-09-06 RX ORDER — MISOPROSTOL 200 UG/1
400 TABLET ORAL
Status: DISCONTINUED | OUTPATIENT
Start: 2023-09-06 | End: 2023-09-06 | Stop reason: HOSPADM

## 2023-09-06 RX ORDER — NALOXONE HYDROCHLORIDE 0.4 MG/ML
0.4 INJECTION, SOLUTION INTRAMUSCULAR; INTRAVENOUS; SUBCUTANEOUS
Status: DISCONTINUED | OUTPATIENT
Start: 2023-09-06 | End: 2023-09-06 | Stop reason: HOSPADM

## 2023-09-06 RX ORDER — ONDANSETRON 4 MG/1
4 TABLET, ORALLY DISINTEGRATING ORAL EVERY 6 HOURS PRN
Status: DISCONTINUED | OUTPATIENT
Start: 2023-09-06 | End: 2023-09-06

## 2023-09-06 RX ORDER — CITRIC ACID/SODIUM CITRATE 334-500MG
30 SOLUTION, ORAL ORAL
Status: DISCONTINUED | OUTPATIENT
Start: 2023-09-06 | End: 2023-09-06 | Stop reason: HOSPADM

## 2023-09-06 RX ORDER — ONDANSETRON 2 MG/ML
4 INJECTION INTRAMUSCULAR; INTRAVENOUS EVERY 6 HOURS PRN
Status: DISCONTINUED | OUTPATIENT
Start: 2023-09-06 | End: 2023-09-06

## 2023-09-06 RX ORDER — BUPIVACAINE HYDROCHLORIDE 2.5 MG/ML
INJECTION, SOLUTION EPIDURAL; INFILTRATION; INTRACAUDAL PRN
Status: DISCONTINUED | OUTPATIENT
Start: 2023-09-06 | End: 2023-09-06

## 2023-09-06 RX ORDER — FENTANYL CITRATE 50 UG/ML
100 INJECTION, SOLUTION INTRAMUSCULAR; INTRAVENOUS
Status: DISCONTINUED | OUTPATIENT
Start: 2023-09-06 | End: 2023-09-06 | Stop reason: HOSPADM

## 2023-09-06 RX ORDER — PROCHLORPERAZINE MALEATE 5 MG
10 TABLET ORAL EVERY 6 HOURS PRN
Status: DISCONTINUED | OUTPATIENT
Start: 2023-09-06 | End: 2023-09-06 | Stop reason: HOSPADM

## 2023-09-06 RX ORDER — TRANEXAMIC ACID 10 MG/ML
1 INJECTION, SOLUTION INTRAVENOUS EVERY 30 MIN PRN
Status: DISCONTINUED | OUTPATIENT
Start: 2023-09-06 | End: 2023-09-07 | Stop reason: HOSPADM

## 2023-09-06 RX ORDER — METHYLERGONOVINE MALEATE 0.2 MG/ML
200 INJECTION INTRAVENOUS
Status: DISCONTINUED | OUTPATIENT
Start: 2023-09-06 | End: 2023-09-06 | Stop reason: HOSPADM

## 2023-09-06 RX ORDER — IBUPROFEN 800 MG/1
800 TABLET, FILM COATED ORAL
Status: DISCONTINUED | OUTPATIENT
Start: 2023-09-06 | End: 2023-09-07

## 2023-09-06 RX ORDER — TRANEXAMIC ACID 10 MG/ML
1 INJECTION, SOLUTION INTRAVENOUS EVERY 30 MIN PRN
Status: DISCONTINUED | OUTPATIENT
Start: 2023-09-06 | End: 2023-09-06 | Stop reason: HOSPADM

## 2023-09-06 RX ORDER — METOCLOPRAMIDE HYDROCHLORIDE 5 MG/ML
10 INJECTION INTRAMUSCULAR; INTRAVENOUS EVERY 6 HOURS PRN
Status: DISCONTINUED | OUTPATIENT
Start: 2023-09-06 | End: 2023-09-06 | Stop reason: HOSPADM

## 2023-09-06 RX ORDER — METHYLERGONOVINE MALEATE 0.2 MG/ML
200 INJECTION INTRAVENOUS
Status: DISCONTINUED | OUTPATIENT
Start: 2023-09-06 | End: 2023-09-07 | Stop reason: HOSPADM

## 2023-09-06 RX ORDER — NALOXONE HYDROCHLORIDE 0.4 MG/ML
0.2 INJECTION, SOLUTION INTRAMUSCULAR; INTRAVENOUS; SUBCUTANEOUS
Status: DISCONTINUED | OUTPATIENT
Start: 2023-09-06 | End: 2023-09-06 | Stop reason: HOSPADM

## 2023-09-06 RX ORDER — EPHEDRINE SULFATE 50 MG/ML
5 INJECTION, SOLUTION INTRAMUSCULAR; INTRAVENOUS; SUBCUTANEOUS
Status: DISCONTINUED | OUTPATIENT
Start: 2023-09-06 | End: 2023-09-06 | Stop reason: HOSPADM

## 2023-09-06 RX ORDER — NALBUPHINE HYDROCHLORIDE 10 MG/ML
2.5-5 INJECTION, SOLUTION INTRAMUSCULAR; INTRAVENOUS; SUBCUTANEOUS EVERY 6 HOURS PRN
Status: DISCONTINUED | OUTPATIENT
Start: 2023-09-06 | End: 2023-09-07 | Stop reason: HOSPADM

## 2023-09-06 RX ORDER — OXYTOCIN/0.9 % SODIUM CHLORIDE 30/500 ML
1-24 PLASTIC BAG, INJECTION (ML) INTRAVENOUS CONTINUOUS
Status: DISCONTINUED | OUTPATIENT
Start: 2023-09-06 | End: 2023-09-06 | Stop reason: HOSPADM

## 2023-09-06 RX ORDER — OXYTOCIN/0.9 % SODIUM CHLORIDE 30/500 ML
340 PLASTIC BAG, INJECTION (ML) INTRAVENOUS CONTINUOUS PRN
Status: DISCONTINUED | OUTPATIENT
Start: 2023-09-06 | End: 2023-09-07 | Stop reason: HOSPADM

## 2023-09-06 RX ORDER — METOCLOPRAMIDE 5 MG/1
10 TABLET ORAL EVERY 6 HOURS PRN
Status: DISCONTINUED | OUTPATIENT
Start: 2023-09-06 | End: 2023-09-06 | Stop reason: HOSPADM

## 2023-09-06 RX ORDER — ONDANSETRON 2 MG/ML
4 INJECTION INTRAMUSCULAR; INTRAVENOUS EVERY 6 HOURS PRN
Status: DISCONTINUED | OUTPATIENT
Start: 2023-09-06 | End: 2023-09-06 | Stop reason: HOSPADM

## 2023-09-06 RX ORDER — OXYTOCIN/0.9 % SODIUM CHLORIDE 30/500 ML
100-340 PLASTIC BAG, INJECTION (ML) INTRAVENOUS CONTINUOUS PRN
Status: DISCONTINUED | OUTPATIENT
Start: 2023-09-06 | End: 2023-09-07 | Stop reason: HOSPADM

## 2023-09-06 RX ORDER — MISOPROSTOL 200 UG/1
400 TABLET ORAL
Status: DISCONTINUED | OUTPATIENT
Start: 2023-09-06 | End: 2023-09-07 | Stop reason: HOSPADM

## 2023-09-06 RX ORDER — PRENATAL VIT/IRON FUM/FOLIC AC 27MG-0.8MG
1 TABLET ORAL DAILY
Status: DISCONTINUED | OUTPATIENT
Start: 2023-09-06 | End: 2023-09-07 | Stop reason: HOSPADM

## 2023-09-06 RX ORDER — BISACODYL 10 MG
10 SUPPOSITORY, RECTAL RECTAL DAILY PRN
Status: DISCONTINUED | OUTPATIENT
Start: 2023-09-06 | End: 2023-09-07 | Stop reason: HOSPADM

## 2023-09-06 RX ORDER — OXYTOCIN/0.9 % SODIUM CHLORIDE 30/500 ML
340 PLASTIC BAG, INJECTION (ML) INTRAVENOUS CONTINUOUS PRN
Status: DISCONTINUED | OUTPATIENT
Start: 2023-09-06 | End: 2023-09-06 | Stop reason: HOSPADM

## 2023-09-06 RX ORDER — ACETAMINOPHEN 325 MG/1
650 TABLET ORAL EVERY 4 HOURS PRN
Status: DISCONTINUED | OUTPATIENT
Start: 2023-09-06 | End: 2023-09-07 | Stop reason: HOSPADM

## 2023-09-06 RX ORDER — ONDANSETRON 4 MG/1
4 TABLET, ORALLY DISINTEGRATING ORAL EVERY 6 HOURS PRN
Status: DISCONTINUED | OUTPATIENT
Start: 2023-09-06 | End: 2023-09-06 | Stop reason: HOSPADM

## 2023-09-06 RX ORDER — DIBUCAINE 0.28 G/28G
OINTMENT TOPICAL ONCE
Status: COMPLETED | OUTPATIENT
Start: 2023-09-06 | End: 2023-09-06

## 2023-09-06 RX ORDER — CARBOPROST TROMETHAMINE 250 UG/ML
250 INJECTION, SOLUTION INTRAMUSCULAR
Status: DISCONTINUED | OUTPATIENT
Start: 2023-09-06 | End: 2023-09-07 | Stop reason: HOSPADM

## 2023-09-06 RX ORDER — LIDOCAINE HYDROCHLORIDE AND EPINEPHRINE 15; 5 MG/ML; UG/ML
INJECTION, SOLUTION EPIDURAL PRN
Status: DISCONTINUED | OUTPATIENT
Start: 2023-09-06 | End: 2023-09-06

## 2023-09-06 RX ORDER — DOCUSATE SODIUM 100 MG/1
100 CAPSULE, LIQUID FILLED ORAL DAILY
Status: DISCONTINUED | OUTPATIENT
Start: 2023-09-06 | End: 2023-09-07 | Stop reason: HOSPADM

## 2023-09-06 RX ADMIN — IBUPROFEN 800 MG: 800 TABLET, FILM COATED ORAL at 18:24

## 2023-09-06 RX ADMIN — LIDOCAINE HYDROCHLORIDE,EPINEPHRINE BITARTRATE 5 ML: 15; .005 INJECTION, SOLUTION EPIDURAL; INFILTRATION; INTRACAUDAL; PERINEURAL at 12:53

## 2023-09-06 RX ADMIN — PRENATAL VIT W/ FE FUMARATE-FA TAB 27-0.8 MG 1 TABLET: 27-0.8 TAB at 18:24

## 2023-09-06 RX ADMIN — Medication: at 13:48

## 2023-09-06 RX ADMIN — BUPIVACAINE HYDROCHLORIDE 10 ML: 2.5 INJECTION, SOLUTION EPIDURAL; INFILTRATION; INTRACAUDAL at 13:38

## 2023-09-06 RX ADMIN — Medication 340 ML/HR: at 16:32

## 2023-09-06 RX ADMIN — DOCUSATE SODIUM 100 MG: 100 CAPSULE, LIQUID FILLED ORAL at 18:24

## 2023-09-06 RX ADMIN — SODIUM CHLORIDE, POTASSIUM CHLORIDE, SODIUM LACTATE AND CALCIUM CHLORIDE: 600; 310; 30; 20 INJECTION, SOLUTION INTRAVENOUS at 08:06

## 2023-09-06 RX ADMIN — LIDOCAINE HYDROCHLORIDE,EPINEPHRINE BITARTRATE 5 ML: 15; .005 INJECTION, SOLUTION EPIDURAL; INFILTRATION; INTRACAUDAL; PERINEURAL at 13:31

## 2023-09-06 RX ADMIN — Medication 10 ML/HR: at 13:47

## 2023-09-06 RX ADMIN — BENZOCAINE AND LEVOMENTHOL: 200; 5 SPRAY TOPICAL at 18:24

## 2023-09-06 RX ADMIN — Medication 12 MILLI-UNITS/MIN: at 14:21

## 2023-09-06 RX ADMIN — SODIUM CHLORIDE, POTASSIUM CHLORIDE, SODIUM LACTATE AND CALCIUM CHLORIDE 1000 ML: 600; 310; 30; 20 INJECTION, SOLUTION INTRAVENOUS at 11:48

## 2023-09-06 RX ADMIN — Medication 2 MILLI-UNITS/MIN: at 08:06

## 2023-09-06 RX ADMIN — DIBUCAINE: 10 OINTMENT TOPICAL at 18:24

## 2023-09-06 RX ADMIN — BUPIVACAINE HYDROCHLORIDE 10 ML: 2.5 INJECTION, SOLUTION EPIDURAL; INFILTRATION; INTRACAUDAL at 12:59

## 2023-09-06 ASSESSMENT — ACTIVITIES OF DAILY LIVING (ADL)
ADLS_ACUITY_SCORE: 20
ADLS_ACUITY_SCORE: 20
HEARING_DIFFICULTY_OR_DEAF: NO
ADLS_ACUITY_SCORE: 20
WEAR_GLASSES_OR_BLIND: YES
DIFFICULTY_COMMUNICATING: NO
ADLS_ACUITY_SCORE: 20
FALL_HISTORY_WITHIN_LAST_SIX_MONTHS: NO
ADLS_ACUITY_SCORE: 20
CONCENTRATING,_REMEMBERING_OR_MAKING_DECISIONS_DIFFICULTY: NO
WALKING_OR_CLIMBING_STAIRS_DIFFICULTY: NO
TOILETING_ISSUES: NO
DIFFICULTY_EATING/SWALLOWING: NO
CHANGE_IN_FUNCTIONAL_STATUS_SINCE_ONSET_OF_CURRENT_ILLNESS/INJURY: NO
VISION_MANAGEMENT: GLASSES
DRESSING/BATHING_DIFFICULTY: NO
DOING_ERRANDS_INDEPENDENTLY_DIFFICULTY: NO
ADLS_ACUITY_SCORE: 20

## 2023-09-06 NOTE — PROGRESS NOTES
S: Induction of labor  B: Patient is a  who presents for scheduled induction of labor @ 39w0d gestation. EFW 6# 14oz  A: IV Oxytocin per OB protocol beginning at 2 mU/min. No contractions prior to Pitocin administration. Pt reports no pain at this time. Understands that she will be on continuous EFM throughout induction. FHR baseline is 135bpm with moderate variability and no decels. Patient agrees with plan of care.  Tushar at the bedside. Pt reports they are expecting their 4th girl. They intend to bottle feed formula and pt plans to get an epidural for pain management.  R: Will observe for active phase of labor and fetal tolerance.     Rupa Arana RN on 2023 at 8:16 AM

## 2023-09-06 NOTE — PROGRESS NOTES
Patient had epidural placed without good levels. Currently working on replacing epidural. Will wait to check her until after she is comfortable.   Melanie Bartlett MD

## 2023-09-06 NOTE — PROGRESS NOTES
Contractions Q2-3minutes on 10mU/min of IV Pitocin. Pt reporting pain 7/10. FHR 140bpm, cat 1 strip. Cervical exam: 5cm/80%/-4. Pt requesting epidural. IVF bolus started. Pt voided. CRNA notified. MD paged for update.    Rupa Arana RN on 9/6/2023 at 11:59 AM

## 2023-09-06 NOTE — ANESTHESIA PREPROCEDURE EVALUATION
"Anesthesia Pre-Procedure Evaluation    Patient: Neo Parra   MRN: [id number] : 1990        Procedure :           Past Medical History:   Diagnosis Date    Gestational hypertension 10/6/2018    Preeclampsia 10/6/2018      Past Surgical History:   Procedure Laterality Date    ZZHC CREATE EARDRUM OPENING,GEN ANESTH      P.E. Tubes x 2      Allergies   Allergen Reactions    Cephalosporins Hives     rash reaction from SUPRAX    Tdap [Tetanus-Diphth-Acell Pertussis] Other (See Comments)     Tingling/numbness of the ipsilateral arm and mouth felt \"weird\" inside, lips tingling. No swelling or SOB.       Social History     Tobacco Use    Smoking status: Never    Smokeless tobacco: Never   Substance Use Topics    Alcohol use: No      Wt Readings from Last 1 Encounters:   23 96.3 kg (212 lb 4 oz)        Anesthesia Evaluation   Pt has had prior anesthetic. Type: [facility].        ROS/MED HX  ENT/Pulmonary:  - neg pulmonary ROS     Neurologic:  - neg neurologic ROS     Cardiovascular:     (+)  hypertension-range: PIH/ -   -  - -                                      METS/Exercise Tolerance: >4 METS    Hematologic:     (+)      anemia,          Musculoskeletal:  - neg musculoskeletal ROS     GI/Hepatic:  - neg GI/hepatic ROS     Renal/Genitourinary:  - neg Renal ROS     Endo:  - neg endo ROS     Psychiatric/Substance Use:  - neg psychiatric ROS     Infectious Disease:  - neg infectious disease ROS     Malignancy:  - neg malignancy ROS     Other:  - neg other ROS          Physical Exam    Airway  airway exam normal      Mallampati: II   TM distance: > 3 FB   Neck ROM: full   Mouth opening: > 3 cm    Respiratory Devices and Support         Dental  no notable dental history         Cardiovascular   cardiovascular exam normal       Rhythm and rate: regular and normal     Pulmonary   pulmonary exam normal        breath sounds clear to auscultation           OUTSIDE LABS:  CBC:   Lab Results   Component " Value Date    WBC 8.9 09/06/2023    WBC 7.5 08/28/2023    HGB 12.0 09/06/2023    HGB 12.2 08/28/2023    HCT 35.3 09/06/2023    HCT 38.0 08/28/2023     09/06/2023     08/28/2023     BMP:   Lab Results   Component Value Date     08/28/2023     06/19/2023    POTASSIUM 3.5 08/28/2023    POTASSIUM 3.4 06/19/2023    CHLORIDE 103 08/28/2023    CHLORIDE 104 06/19/2023    CO2 22 08/28/2023    CO2 21 (L) 06/19/2023    BUN 3.4 (L) 08/28/2023    BUN 5.7 (L) 06/19/2023    CR 0.59 08/28/2023    CR 0.57 06/19/2023    GLC 75 08/28/2023     (H) 06/19/2023     COAGS: No results found for: PTT, INR, FIBR  POC:   Lab Results   Component Value Date    HCG Positive (A) 05/08/2019     HEPATIC:   Lab Results   Component Value Date    ALBUMIN 3.8 08/28/2023    PROTTOTAL 6.4 08/28/2023    ALT 9 08/28/2023    AST 20 08/28/2023    ALKPHOS 143 (H) 08/28/2023    BILITOTAL 0.5 08/28/2023     OTHER:   Lab Results   Component Value Date    A1C 5.3 12/02/2022    MIGUEL 9.3 08/28/2023    TSH 1.53 08/26/2022       Anesthesia Plan    ASA Status:  2    NPO Status:  NPO Appropriate    Anesthesia Type: Epidural.              Consents    Anesthesia Plan(s) and associated risks, benefits, and realistic alternatives discussed. Questions answered and patient/representative(s) expressed understanding.     - Discussed:     - Discussed with:  Patient      - Extended Intubation/Ventilatory Support Discussed: No.      - Patient is DNR/DNI Status: No     Use of blood products discussed: No .     Postoperative Care    Pain management: IV analgesics, Neuraxial analgesia.        Comments:    Other Comments: The risks and benefits of anesthesia, and the alternatives where applicable, have been discussed with the patient, and they wish to proceed.            KHANG Torres CRNA

## 2023-09-06 NOTE — PROGRESS NOTES
2 hour PP QBL 240ml. Fundus firm. Pt reports minimal pain, PRN Ibuprofen was given. VS have been stable with the exception of 2 BP's of 145/70 and 144/69. Dr. Bartlett aware and stated to continue with normal postpartum vital signs and cares. Will continue to monitor per protocol.    Rupa Arana RN on 9/6/2023 at 6:50 PM

## 2023-09-06 NOTE — ANESTHESIA PROCEDURE NOTES
Epidural catheter Procedure Note    Pre-Procedure   Staff -        CRNA: Suman Malin APRN CRNA       Other Anesthesia Staff: Sandra Larson       Performed By: CRNA and BARI       Location: OB       Pre-Anesthestic Checklist: patient identified, IV checked, risks and benefits discussed, informed consent, monitors and equipment checked, pre-op evaluation and at physician/surgeon's request  Timeout:       Correct Patient: Yes        Correct Procedure: Yes        Correct Site: Yes        Correct Position: Yes   Procedure Documentation  Procedure: epidural catheter       Diagnosis: Term pregnancy - labor pains       Patient Position: sitting       Patient Prep/Sterile Barriers: sterile gloves, mask, patient draped       Skin prep: Betadine       Local skin infiltrated with 5 mL of 1% lidocaine.        Insertion Site: L4-5. (midline approach).       Technique: LORT air        RAMOS at 7 cm.       Needle Type: Amy needle and Ferrara       Needle Gauge: 18.        Needle Length (Inches): 3.5        Catheter: 20 G.          Catheter threaded easily.         5 cm epidural space.           # of attempts: 1 and  # of redirects:  0    Assessment/Narrative         Paresthesias: No.       Test dose of 5 mL lidocaine 1.5% w/ 1:200,000 epinephrine at 12:53 CDT.         Test dose negative, 3 minutes after injection, for signs of intravascular, subdural, or intrathecal injection.       Insertion/Infusion Method: LORT air       Aspiration negative for Heme or CSF via Epidural Catheter.       Sensory Level Left: T6.       Sensory Level Right: T6.     Comments:  Pt. Sitting at bedside. Risks, benefits and alternatives of epidural analgesia discussed with pt.  Her questions were answered, she consents/wishes to proceed as planned.  Back exam landmarks identified, skin prep with betadine.  Lidocaine infiltration at L4 - L5. ES identified via RAMOS (Air) @ 7 cm after 1 attempt (s) and 0 redirect (s).  EC passed 5 centimeters without  "paresthesias or resistance noted.  Negative heme / CSF aspirated.  EC securred with tegaderm/medipore tape. Patient appeared to tolerate procedure well.   Test dose and loading dose given as noted but levels only to calf so elect to discontinue EC with tip intact at 1325 hrs.    With Pt. Sitting at bedside.   Back exam landmarks identified, skin prep with betadine.  Lidocaine infiltration at L3 - L4. ES identified via RAMOS (Air) @ 8 cm after 2 attempt (s) and 3 redirect (s).  EC passed 5 centimeters without paresthesias or resistance noted.  Negative heme / CSF aspirated.  EC securred with tegaderm/medipore tape. Patient appeared to tolerate procedure well. Test dose and loading dose given as noted with final levels of H8F-A9N obtained.  PCEA initiated at 10 ml/hr at 1347 hrs.  Excellent analgesia obtained with excellent BLE motor function remaining intact.      FOR [facility] ([facility]) ONLY:   Pain Team Contact information: please page the Pain Team Via SMRxT. Search \"Pain\". During daytime hours, please page the attending first. At night please page the resident first.      "

## 2023-09-06 NOTE — PROGRESS NOTES
Early decels noted on fetal monitoring. Complete at 1602, -1 station. Placed back in throne position to labor down. Tachysystole on monitor, IV Pitocin turned down to 10mU/min. Will notify MD. Rupa Arana RN on 9/6/2023 at 4:14 PM

## 2023-09-06 NOTE — PROGRESS NOTES
Straight cathed at 1530 for 400ml dark yellow urine. Bloody show noted on chux. Contractions Q2-3min. IV Pitocin increased to 16mU/min. FHR 135bpm, cat 1 strip. Pt currently sitting in throne position with knees out.    Rupa Arana RN on 9/6/2023 at 3:48 PM

## 2023-09-06 NOTE — PROGRESS NOTES
"Dr. Bartlett updated. Contractions Q2-3minutes. Pt reports pain 3/10 and \"very tolerable\". FHR 135bpm, cat 1 strip. IV Pitocin currently infusing at 8mU/min. MD plans to see pt and check cervix sometime around noon. Pt agreeable to plan of care.    Rupa Arana RN on 9/6/2023 at 10:45 AM    "

## 2023-09-06 NOTE — H&P
"  2023    Neo Parra  [id number]        OB Admit History & Physical      Ms. aPrra  is here for induction of labor due to previous hx of preeclampsia.    Patient's last menstrual period was 2022.   Her Estimated Date of Delivery: Sep 13, 2023, making her 39w0d  wks.      Estimated body mass index is 34.68 kg/m  as calculated from the following:    Height as of 23: 1.666 m (5' 5.6\").    Weight as of 23: 96.3 kg (212 lb 4 oz).  Her prenatal course has been uncomplicated by a history of mild preeclampsia late onset in her second pregnancy, and gestational hypertension starting the second trimester with her third pregnancy.  Her BP this time has been stable and she has been taking ASA. She also has a marginal cord insertion, and good fetal growth. She has iron deficiency anemia s/p iron infusions.     See prenatal for labs.  Negative, GBBS, Rubella Immune, RH positive.     Estimated fetal weight= 6 14     She is a 32 year old   Her OB history:   OB History    Para Term  AB Living   4 3 3 0 0 3   SAB IAB Ectopic Multiple Live Births   0 0 0 0 3      # Outcome Date GA Lbr Miguel Ángel/2nd Weight Sex Delivery Anes PTL Lv   4 Current            3 Term 20 39w1d 02:04 / 01:41 3.945 kg (8 lb 11.2 oz) F Vag-Spont EPI N MO      Name: Rylee      Apgar1: 9  Apgar5: 9   2 Term 10/06/18 39w1d 01:48 / 00:10 3.94 kg (8 lb 11 oz) F Vag-Spont EPI N MO      Complications: Preeclampsia/Hypertension      Name: Felicitas      Apgar1: 9  Apgar5: 9   1 Term 16 39w4d 05:25 / 02:11 3.779 kg (8 lb 5.3 oz) F Vag-Spont EPI N MO      Name: Erika      Apgar1: 8  Apgar5: 9      Obstetric Comments   EDC 2023 based on patient estimated ovulation and conception.   to Tushar.            Past Medical History:   Diagnosis Date    Gestational hypertension 10/6/2018    Preeclampsia 10/6/2018          Past Surgical History:   Procedure Laterality Date    [facility] CREATE EARDRUM OPENING,GEN ANESTH      " P.E. Tubes x 2         No current outpatient medications on file.       Allergies: Cephalosporins and Tdap [tetanus-diphth-acell pertussis]      REVIEW OF SYSTEMS:  NEUROLOGIC:  Negative  EYES:  Negative  ENT:  Negative  GI:  Negative  BREAST:  Negative  :  Negative  GYN:  Negative  CV:  Negative  PULMONARY:  Negative  MUSCULOSKELETAL:  Negative  PSYCH:  Negative        Social History     Socioeconomic History    Marital status:      Spouse name: Tushar    Number of children: 3    Years of education: Not on file    Highest education level: Not on file   Occupational History    Not on file   Tobacco Use    Smoking status: Never    Smokeless tobacco: Never   Vaping Use    Vaping Use: Never used   Substance and Sexual Activity    Alcohol use: No    Drug use: No    Sexual activity: Yes     Partners: Male     Birth control/protection: None   Other Topics Concern     Service No    Blood Transfusions No    Caffeine Concern No    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 Not Asked   Social History Narrative    1/2023 Lives in [place] with , Tushar and dtrs Erika and Felicitas and Rylee.   No smokers in the household.  Has an indoor cat.  Advised about toxoplasmosis precautions.  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  No concerns about domestic violence.     Social Determinants of Health     Financial Resource Strain: Not on file   Food Insecurity: Not on file   Transportation Needs: Not on file   Physical Activity: Not on file   Stress: Not on file   Social Connections: Not on file   Intimate Partner Violence: Not on file   Housing Stability: Not on file      Family History   Problem Relation Age of Onset     Hypertension Mother     Myocardial Infarction Mother     No Known Problems Father     No Known Problems Sister     No Known Problems Sister     No Known Problems Brother     No Known Problems Brother     No Known Problems Brother     No Known Problems Maternal Grandmother     No Known Problems Maternal Grandfather     No Known Problems Paternal Grandmother     No Known Problems Paternal Grandfather     No Known Problems Daughter     Neurologic Disorder Daughter         Chiari syndrom    No Known Problems Daughter        Vitals:     Vitals:    23 0819 23 0820   BP: 134/85    BP Location: Left arm    Patient Position: Semi-Clark's    Cuff Size: Adult Regular    Resp: 16    Temp: 98.9  F (37.2  C)    TempSrc: Oral    SpO2:  98%         with accels and no decels. She is currently ragini every 2 minutes, rates 3-4/10.   Is on 8 mU/min of pitocin.     Not reexamined at this time, seen yesterday in clinic and will examine at noon.     Assessment:  IUP at 39w0d  with hx of pregnancy induced hypertension and preeclampsia in prior pregnancies.    Plan: Plan for induction with Pitocin.  Currently underway but not actively laboring yet.  We will evaluate her in about 1 hour or sooner if needed.  Consider AROM and she is requesting epidural when the time comes.  Anticipate  later today.      Melanie Bartlett MD  Dept of Family Medicine  2023    constant

## 2023-09-06 NOTE — PROGRESS NOTES
S: Delivery  B: Induced Labor,  @ 14yya3cgsc gestation  A: Patient delivered Vaginal at 1626 with Dr. Bartlett in attendance. Baby delivered and placed on mother's low abdomen for delayed cord clamping where baby was dried and stimulated. After cord clamped and cut, baby was placed skin to skin on mother's chest within 5 minutes following delivery. Apgars 8/9. Placenta was delivered @ 1632 followed by administration of oxytocin. Bonding initiated with mom and baby. Mother would like to bottle feed formula, will feed as soon as feeding cues are shown. Mother reports she will pump and bottle feed maternal breast milk once her milk comes in. Assistance will be provided through her stay here. See flowsheet for VS and PP checks. Labor care plan goals met.  R: Expect routine postpartum care. Anticipate first feeding within the hour.    Rupa Arana RN on 2023 at 4:48 PM

## 2023-09-06 NOTE — PROGRESS NOTES
Doing well. Now very comfortable s/p epidural, was too high for a bit now just right. Not feeling ctx. Pitocin up to 12 mU/min. Ctx seem to have lessened with epidural.   No new concerns.     O:    Vitals:    23 1400 23 1404 23 1410 23 1420   BP: 122/64 105/58 110/63 122/68   BP Location:       Patient Position:       Cuff Size:       Pulse:       Resp:       Temp:       TempSrc:       SpO2: 96%  96% 95%      Vitals noted.  Patient alert, oriented, and in no acute distress.   CV:  RRR without murmur.   Respiratory:  Lungs clear to auscultation bilaterally.    baseline with cat 1 strip. Ctx q 2-4 minutes.     SVE 6/80%/-3    AROM with clear fluid.     A/P:  term induction for hx gest HTN/pree in past pregnancy.   Doing well. Titrate pitocin, continue position changes. Anticipate  today.   Melanie Bartlett MD

## 2023-09-06 NOTE — PROGRESS NOTES
MD at bedside. Cervix check: 6cm/80%/-3. AROM @ 1455 returned clear fluid. Ctx Q2-4min, IV Pitocin increased to 14mU/min. FHR 135bpm, cat 1 strip. Epidural levels too high but resolved after sitting up. No respiratory issues, pt reported feeling a little numb on her chest. Now positioned on her L side semi fowlers with the peanut ball. Will next reposition will empty her bladder.    Rupa Arana RN on 9/6/2023 at 3:08 PM

## 2023-09-06 NOTE — L&D DELIVERY NOTE
Delivery Summary    Neo Parra MRN# [id number]   Age: 32 year old YOB: 1990     ASSESSMENT & PLAN: Neo Parra is a 32 year old  female who presents at 39w0d weeks for induction of labor due to previous history of preeclampsia and gestational HTN, marginal cord insertion.       Her prenatal course was otherwise uncomplicated.  She received her prenatal care here in [place].      Her routine prenatal labs were all normal as follows:   A POS   antibody screen negative  Rubella immune   Trepab negative  HepBsAg negative  HIV negative  She passed her 1 hour GTT.   Her GBS is negative    On arrival at the birthplace her cervical exam is as follows:    4/70%/-1    She had pitocin induction of labor.   She received epidural with good relief of her labor pain.   She had AROM at 14:55.  She labored well and was completely dilated at 16:02.  First stage of labor was 1 hrs 8 min.     She pushed very well with  viable female infant over an intact perineum at 16:26.  The infant was then laid on mother's abdomen, received drying and tactile stimulation.  The cord was delayed clamped x 2 and cut by the FOB who was in attendance.  There was spontaneous cry and APGARS at 1 and 5 minutes were 8 and 9 respectively.   Second stage of labor was 0 hrs and 24 minutes.     The placenta delivered spontaneously, intact with a 3VC at 16:32.  Pitocin was given IV after delivery of the placenta.  Third stage of labor was 6 minutes.     Examination of the vaginal walls and perineum revealed a small posterior midline second degree perineal laceration that was not bleeding and not repaired.  Fundal massage proved that the uterine fundus was firm.  EBL was 100 ml.      A:   viable female infant at 39 weeks gestation with APGARS of 8 and 9 weighing 8 lbs 9 oz.    P:  Mom, dad and baby are doing well after delivery.  Mom plans to formula feed baby and baby will be rooming in with mom.  I anticipate they will  both have a normal course.      Melanie Bartlett MD         Mary Parra-Neo [[id number]]      Labor Event Times      Latent labor onset date/time: 2023 0930    Active labor onset date: 23 Onset time:  2:54 PM CDT   Dilation complete date: 23 Complete time:  4:02 PM   Start pushing date/time: 2023 1625          Labor Length      1st Stage (hrs): 1 (min): 8   2nd Stage (hrs): 0 (min): 24   3rd Stage (hrs): 0 (min): 6          Labor Events     labor?: No   steroids: None  Labor Type: Induction/Cervical ripening  Predominate monitoring during 1st stage: continuous electronic fetal monitoring     Antibiotics received during labor?: No     Rupture identifier: Sac 1  Rupture date/time: 23 1455   Rupture type: Artificial Rupture of Membranes  Fluid color: Clear  Fluid odor: Normal     Induction: Oxytocin  Induction date/time: 23 0806    Cervical ripening date/time:      Indications for induction: Other Pregnant Patient Indications (Comment to specify)     Augmentation: None       Delivery/Placenta Date and Time      Delivery Date: 23 Delivery Time:  4:26 PM   Placenta Date/Time: 2023  4:32 PM  Delivering clinician: Melanie Bartlett MD   Other personnel present at delivery:  Provider Role   Rupa Arana RN Registered Nurse   Torie Krause RN Registered Nurse             Vaginal Counts       Initial count performed by 2 team members:  Two Team Members   PING Vela MD         [place] Suture Needles Sponges (RETIRED) Instruments   Initial counts 2  5    Added to count 0  0    Relief counts       Final counts 2  5            Placed during labor Accounted for at the end of labor   FSE No NA   IUPC No NA   Cervidil No NA                  Final count performed by 2 team members:  Two Team Members   PING Vela MD      Final count correct?: Yes  Pre-Birth Team Brief: Complete  Post-Birth Team Debrief: Complete    "    Apgars    Living status: Living   1 Minute 5 Minute 10 Minute 15 Minute 20 Minute   Skin color: 0  1       Heart rate: 2  2       Reflex irritability: 2  2       Muscle tone: 2  2       Respiratory effort: 2  2       Total: 8  9       Apgars assigned by: ASHLEY RODRIGUEZ RN       Cord      Vessels: 3 Vessels    Cord Complications: None               Cord Blood Disposition: Lab    Gases Sent?: No    Delayed cord clamping?: Yes    Cord Clamping Delay (seconds):  seconds    Stem cell collection?: No            Resuscitation    Methods: None  Output in Delivery Room: Voided        Measurements      Weight: 8 lb 8.9 oz Length: 1' 9\"     Head circumference: 34.3 cm Chest circumference: 34.9 cm   Output in delivery room: Voided       Skin to Skin and Feeding Plan      Skin to skin initiation date/time: 1841    Skin to skin with: Mother  Skin to skin end date/time: 1841           Labor Events and Shoulder Dystocia    Fetal Tracing Prior to Delivery: Category 2  Shoulder dystocia present?: Neg       Delivery (Maternal) (Provider to Complete) (527991)    Episiotomy: None  Perineal lacerations: 2nd Repaired?: No   Repair suture: None  Genital tract inspection done: Pos       Blood Loss  Mother: Steffi Parra #[id number]     Start of Mother's Information      Delivery Blood Loss  23 1454 - 23 1722      None                 End of Mother's Information  Mother: Steffi Parra #[id number]                Delivery - Provider to Complete ([id number])    Delivering clinician: Melanie Bartlett MD  Delivery Type (Choose the 1 that will go to the Birth History): Vaginal, Spontaneous                         Other personnel:  Provider Role   Rupa Arana RN Registered Nurse   Torie Rodriguez RN Registered Nurse                    Placenta    Date/Time: 2023  4:32 PM  Removal: Spontaneous  Disposition: Hospital disposal             Anesthesia    Method: Epidural  Cervical dilation " at placement: 4-7                    Presentation and Position    Presentation: Vertex    Position: Right Occiput Anterior                     Melanie Bartlett MD

## 2023-09-07 VITALS
RESPIRATION RATE: 16 BRPM | HEART RATE: 87 BPM | SYSTOLIC BLOOD PRESSURE: 130 MMHG | DIASTOLIC BLOOD PRESSURE: 80 MMHG | OXYGEN SATURATION: 99 % | TEMPERATURE: 98.3 F

## 2023-09-07 LAB — HGB BLD-MCNC: 11.3 G/DL (ref 11.7–15.7)

## 2023-09-07 PROCEDURE — 250N000013 HC RX MED GY IP 250 OP 250 PS 637: Performed by: FAMILY MEDICINE

## 2023-09-07 PROCEDURE — 36415 COLL VENOUS BLD VENIPUNCTURE: CPT | Performed by: FAMILY MEDICINE

## 2023-09-07 PROCEDURE — 85018 HEMOGLOBIN: CPT | Performed by: FAMILY MEDICINE

## 2023-09-07 RX ORDER — IBUPROFEN 600 MG/1
600 TABLET, FILM COATED ORAL EVERY 6 HOURS PRN
Qty: 30 TABLET | Refills: 0 | Status: SHIPPED | OUTPATIENT
Start: 2023-09-07

## 2023-09-07 RX ORDER — DOCUSATE SODIUM 100 MG/1
100 CAPSULE, LIQUID FILLED ORAL 2 TIMES DAILY PRN
Qty: 30 CAPSULE | Refills: 0 | Status: SHIPPED | OUTPATIENT
Start: 2023-09-07

## 2023-09-07 RX ORDER — ACETAMINOPHEN 325 MG/1
650 TABLET ORAL EVERY 4 HOURS PRN
COMMUNITY
Start: 2023-09-07

## 2023-09-07 RX ADMIN — PRENATAL VIT W/ FE FUMARATE-FA TAB 27-0.8 MG 1 TABLET: 27-0.8 TAB at 09:20

## 2023-09-07 RX ADMIN — DOCUSATE SODIUM 100 MG: 100 CAPSULE, LIQUID FILLED ORAL at 09:20

## 2023-09-07 RX ADMIN — IBUPROFEN 800 MG: 800 TABLET, FILM COATED ORAL at 09:20

## 2023-09-07 RX ADMIN — FERROUS GLUCONATE 324 MG: 324 TABLET ORAL at 09:20

## 2023-09-07 ASSESSMENT — ACTIVITIES OF DAILY LIVING (ADL)
ADLS_ACUITY_SCORE: 20

## 2023-09-07 NOTE — PLAN OF CARE
S: Discharge  to home.    B: Patient had a Vaginal delivery with a second degree perineal tear, no repair. Baby girl Baby's name Shira Parra, bottle:  Similac. Support person's name Tushar Parra.     A: VSS. Fundus is midline and firm, bleeding is light. No clots or soaking a pad in less than an hour. Independent in the room. Tolerating a regular diet, denies any nausea. Voiding without difficulty.     R: Discharge instructions reviewed and questions answered.  Belongings gathered and returned to the patient. Agreed to follow up in 6 weeks or sooner with any question or concerns.     Nursing Discharge Checklist:    Pneumovax screened and given, if appropriate: N/A  Influenza vaccine screened and given, if appropriate: N/A  Staples removed (): N/A  Breast milk returned: N/A  Hydrogel pads sent home:NO  Birth Certificate Done: YES  Public Health Referral Made: NO

## 2023-09-07 NOTE — DISCHARGE INSTRUCTIONS
[facility] Discharge Instructions     Discharge disposition:  Discharged to home       Diet:  Regular       Activity No sex for 6 week(s)       Follow-up: Follow up with Dr. Bartlett in 6 weeks for postpartum visit       Additional instructions: [facility] staff is available 24 hours 7 days for questions about you or your baby.  Please don't hesitate to call with any concerns.        Additional Patient Information:  Enjoy beautiful marek Silva!    Postpartum Vaginal Delivery Instructions    Activity     Ask family and friends for help when you need it.  Do not place anything in your vagina for 6 weeks.  You are not restricted on other activities, but take it easy for a few weeks to allow your body to recover from delivery.  You are able to do any activities you feel up to that point.  No driving until you have stopped taking your pain medications (usually two weeks after delivery).     Call your health care provider if you have any of these symptoms:     Increased pain, swelling, redness, or fluid around your stiches from an episiotomy or perineal tear.  A fever above 100.4 F (38 C) with or without chills when placing a thermometer under your tongue.  You soak a sanitary pad with blood within 1 hour, or you see blood clots larger than a golf ball.  Bleeding that lasts more than 6 weeks.  Vaginal discharge that smells bad.  Severe pain, cramping or tenderness in your lower belly area.  A need to urinate more frequently (use the toilet more often), more urgently (use the toilet very quickly), or it burns when you urinate.  Nausea and vomiting.  Redness, swelling or pain around a vein in your leg.  Problems breastfeeding or a red or painful area on your breast.  Chest pain and cough or are gasping for air.  Problems coping with sadness, anxiety, or depression.  If you have any concerns about hurting yourself or the baby, call your provider immediately.   You have questions or  concerns after you return home.     Keep your hands clean:  Always wash your hands before touching your perineal area and stitches.  This helps reduce your risk of infection.  If your hands aren't dirty, you may use an alcohol hand-rub to clean your hands. Keep your nails clean and short.

## 2023-09-07 NOTE — PLAN OF CARE
"S: Shift review   B:Neo is a  who delivered vaginally on . 2nd degree tear, no repair  A: VSS, patient is independent with mobility, has declined pain meds overnight rating pain \"2-3\". Handles baby confidently, feeding every 2-4 hours.  R: Continue with routine PP care      "

## 2023-09-07 NOTE — ANESTHESIA POSTPROCEDURE EVALUATION
Patient: Neo Parra    Procedure: * No procedures listed *       Anesthesia Type:  Epidural    Note:  Disposition: Inpatient   Postop Pain Control: Uneventful            Sign Out: Well controlled pain   PONV: No   Neuro/Psych: Uneventful            Sign Out: Acceptable/Baseline neuro status   Airway/Respiratory: Uneventful            Sign Out: Acceptable/Baseline resp. status   CV/Hemodynamics: Uneventful            Sign Out: Acceptable CV status   Other NRE: NONE   DID A NON-ROUTINE EVENT OCCUR? No    Event details/Postop Comments:  Pt was happy with her anesthesia care.  No complications.  I advised the pt she may have some soreness at the epidural site and this is normal.  However if the soreness continues over a week or if redness is noted around the site to let anesthesia know.  I will follow up with the pt if needed.           Last vitals:  Vitals:    09/07/23 0415 09/07/23 0900 09/07/23 1309   BP: 127/78 120/79 125/82   Pulse: 81     Resp: 16 16 16   Temp: 98.4  F (36.9  C) 97.9  F (36.6  C) 97.5  F (36.4  C)   SpO2:   99%       Electronically Signed By: KHANG Torres CRNA  September 7, 2023  1:30 PM

## 2023-09-07 NOTE — DISCHARGE SUMMARY
[facility] Discharge Summary    Neo Parra MRN# [id number]   Age: 32 year old YOB: 1990     Date of Admission:  2023  Date of Discharge::  2023  Admitting Physician:  Melanie Bartlett MD  Discharge Physician:  Melanie Bartlett MD     Home clinic: [facility] Diagnoses:   Supervision of other high risk pregnancy in 3rd trimester  History of gestational HTN   History of preeclampsia in previous pregnancy  Iron deficiency anemia of pregnancy  Marginal cord insertion         Discharge Diagnosis:     Normal spontaneous vaginal delivery  Intrauterine pregnancy at 39 weeks gestation          Procedures:     Procedure(s): No additional procedures performed              Medications Prior to Admission:     Medications Prior to Admission   Medication Sig Dispense Refill Last Dose    Prenatal Vit-Fe Fumarate-FA (PRENATAL MULTIVITAMIN W/IRON) 27-0.8 MG tablet Take 1 tablet by mouth daily   2023    [DISCONTINUED] aspirin (ASA) 81 MG chewable tablet Take 1 tablet (81 mg) by mouth daily 90 tablet 0 Past Week    [DISCONTINUED] ferrous gluconate (FERGON) 324 (38 Fe) MG tablet Take 1 tablet (324 mg) by mouth daily (with breakfast) 90 tablet 1 Past Week             Discharge Medications:     Current Discharge Medication List        START taking these medications    Details   acetaminophen (TYLENOL) 325 MG tablet Take 2 tablets (650 mg) by mouth every 4 hours as needed for mild pain or fever (greater than or equal to 38  C /100.4  F (oral) or 38.5  C/ 101.4  F (core).)    Associated Diagnoses:  (normal spontaneous vaginal delivery)      benzocaine-menthol (DERMOPLAST) 20-0.5 % AERO Use on perineum as needed for pain    Associated Diagnoses:  (normal spontaneous vaginal delivery)      docusate sodium (COLACE) 100 MG capsule Take 1 capsule (100 mg) by mouth 2 times daily as needed for constipation  Qty: 30 capsule,  Refills: 0    Associated Diagnoses:  (normal spontaneous vaginal delivery)      ibuprofen (ADVIL/MOTRIN) 600 MG tablet Take 1 tablet (600 mg) by mouth every 6 hours as needed for other (cramping)  Qty: 30 tablet, Refills: 0    Associated Diagnoses:  (normal spontaneous vaginal delivery)           CONTINUE these medications which have NOT CHANGED    Details   Prenatal Vit-Fe Fumarate-FA (PRENATAL MULTIVITAMIN W/IRON) 27-0.8 MG tablet Take 1 tablet by mouth daily           STOP taking these medications       aspirin (ASA) 81 MG chewable tablet Comments:   Reason for Stopping:         ferrous gluconate (FERGON) 324 (38 Fe) MG tablet Comments:   Reason for Stopping:                     Consultations:   No consultations were requested during this admission          Brief History of Labor:   Patient was admitted for induction of labor due to history of Gest HTN and Pree in previous pregnancies. She went on to deliver a healthy baby girl without complications.  Please see delivery summary for full details.              Hospital Course:   The patient's hospital course was unremarkable.  On discharge, her pain was well controlled.Vaginal bleeding is similar to peak menstrual flow.  Voiding without difficulty.  Ambulating well and tolerating a normal diet.  No fever.  Infant is formula fed and stable. She was discharged on post-partum day #1 at her request.    Her blood pressures were well controlled. She had a couple BPs in the 140s but never anything severe, nothing that required treatment or was sustained.     On the day of discharge her exam is as follows:    Vitals:    23 2000 23 0030 23 0415 23 0900   BP: 131/85 117/58 127/78 120/79   BP Location:    Left arm   Patient Position:    Sitting   Cuff Size:  Adult Regular Adult Regular Adult Regular   Pulse:  85 81    Resp: 15 16 16 16   Temp: 98.3  F (36.8  C) 98.3  F (36.8  C) 98.4  F (36.9  C) 97.9  F (36.6  C)   TempSrc: Oral Oral Oral  Oral   SpO2:          Vitals noted.  Patient alert, oriented, and in no acute distress. CV:  RRR without murmur. Respiratory:  Lungs clear to auscultation bilaterally. Abdomen:  Soft, tender with palpation of the uterine fundus which is firm and below the level of the umbilicus, nondistended with good bowel sounds and no masses or hepatosplenomegaly.  Extremities warm and dry without significant edema.       Post-partum hemoglobin:   Hemoglobin   Date Value Ref Range Status   09/07/2023 11.3 (L) 11.7 - 15.7 g/dL Final                   Discharge Instructions and Follow-Up:     Discharge diet: Regular   Discharge activity: No sex for 6 week(s)   Discharge follow-up: Follow up with Dr. Bartlett in 6 weeks   Wound care: Tub soak daily            Discharge Disposition:     Discharged to home      Attestation:  I have reviewed today's vital signs, notes, medications, labs and imaging.    Melanie Bartlett MD

## 2023-09-12 ENCOUNTER — MEDICAL CORRESPONDENCE (OUTPATIENT)
Dept: HEALTH INFORMATION MANAGEMENT | Facility: CLINIC | Age: 33
End: 2023-09-12
Payer: COMMERCIAL

## 2023-09-14 NOTE — PROGRESS NOTES
Neo Parra  Gender: female  : 1990  [address] [facility] [address]  [phone] (home)   Medical Record: [id number]  Primary Care Provider: Melanie Bartlett    [facility]   ?   Discharge Phone Call: Key Words/Key Times     How are you and the baby?      How are feedings going?      Voiding & Stooling?      Any questions or concerns?      Follow-up appointment?      We want to provide excellent care here at [facility]. Do you have any feedback for us that would help us improve?      Call back COMMENTS:          Attempted Calls:   _2023 1314 discharge callback attempt, L/M nidia Samayoa RN    2023 1311 2nd attempt, L/M MI Arana, RN    Rupa Arana, RN on 2023 at 1:11 PM

## 2023-11-15 ENCOUNTER — PRENATAL OFFICE VISIT (OUTPATIENT)
Dept: FAMILY MEDICINE | Facility: CLINIC | Age: 33
End: 2023-11-15
Payer: COMMERCIAL

## 2023-11-15 VITALS
HEIGHT: 66 IN | OXYGEN SATURATION: 98 % | SYSTOLIC BLOOD PRESSURE: 126 MMHG | TEMPERATURE: 97.7 F | DIASTOLIC BLOOD PRESSURE: 90 MMHG | BODY MASS INDEX: 30.92 KG/M2 | HEART RATE: 92 BPM | RESPIRATION RATE: 18 BRPM | WEIGHT: 192.38 LBS

## 2023-11-15 DIAGNOSIS — F43.0 ACUTE REACTION TO STRESS: ICD-10-CM

## 2023-11-15 PROCEDURE — 99207 PR POST PARTUM EXAM: CPT | Performed by: FAMILY MEDICINE

## 2023-11-15 NOTE — PROGRESS NOTES
"Neo is here for a 6-week postpartum checkup.    She had a  of a viable girl, weight 8 pounds 9 oz., with no complications. Date of delivery was 23. Since delivery, she has been breast feeding.  She has No signs of infection, bleeding or other complications.  She is not pregnant.  Not yet sexually active. We discussed contraceptions and she has chosen vasectomy.      Post partum tubal: No  History of Gestational Diabetes? No  Type of Delivery:  Vaginal  Feeding Method:  Breast  If initiated breast feeding and stopped, how long did you breast feed?:  na      REVIEW OF SYSTEMS:  Ears/Nose/Throat: negative  Respiratory: negative  Cardiovascular: negative  Gastrointestinal: negative  Genitourinary: negative  Musculoskeletal: negative    Neurologic: negative   Skin: negative   Endocrine:  negative  Psych: positive for concerns about postpartum depression, just feels a little overwhelmed at times, busy with 4 kids. Would like to do some counseling.       Past Medical History:   Diagnosis Date    Gestational hypertension 10/06/2018    History of pre-eclampsia     Preeclampsia 10/06/2018       Past Surgical History:   Procedure Laterality Date    [facility] CREATE EARDRUM OPENING,GEN ANESTH  1994    P.E. Tubes x 2       Family History   Problem Relation Age of Onset    Hypertension Mother     Myocardial Infarction Mother     No Known Problems Father     No Known Problems Sister     No Known Problems Sister     No Known Problems Brother     No Known Problems Brother     No Known Problems Brother     No Known Problems Maternal Grandmother     No Known Problems Maternal Grandfather     No Known Problems Paternal Grandmother     No Known Problems Paternal Grandfather     No Known Problems Daughter     Neurologic Disorder Daughter         Chiari syndrom    No Known Problems Daughter            EXAM:  BP (!) 126/90   Pulse 92   Temp 97.7  F (36.5  C) (Temporal)   Resp 18   Ht 1.664 m (5' 5.5\")   Wt 87.3 kg (192 lb 6 oz)  "  LMP 2022   SpO2 98%   Breastfeeding Yes   BMI 31.53 kg/m    HEENT: grossly normal.  NECK: no lymphadenopathy or thyroidomegaly.  LUNGS: CTA X 2, no rales or crackles.  BACK: No spinal or CVA tenderness.  HEART: RRR without murmurs clicks or gallops.  ABDOMEN: soft, non tender, good bowel sounds, without masses rebound, guarding or tenderness.  INCISION: na  PELVIC:  deferred    EXTREMITIES:  warm to touch, good pulses, no ankle edema or calf tenderness.  NEUROLOGIC: grossly normal.    ASSESSMENT:   6-week postpartum exam after .    ICD-10-CM    1. Routine postpartum follow-up  Z39.2       2. Acute reaction to stress  F43.0 Adult Mental Health  Referral           PLAN:    Contraception methods discussed,  planning vasectomy.   Follow up in 1 year for next routine physical.   Referral given for mental health therapy. Discussed ways to reduce stress, make sure she is eating right, getting sleep and exercise as able.   Discussed postpartum depression symptoms to watch for and when to follow up.   Melanie Bartlett MD

## 2024-01-03 PROBLEM — F43.0 ACUTE REACTION TO STRESS: Status: ACTIVE | Noted: 2024-01-03

## 2024-01-03 PROBLEM — O09.893 SUPERVISION OF OTHER HIGH RISK PREGNANCIES, THIRD TRIMESTER: Status: RESOLVED | Noted: 2019-09-17 | Resolved: 2024-01-03

## 2024-01-03 PROBLEM — D50.9 IRON DEFICIENCY ANEMIA DURING PREGNANCY: Status: RESOLVED | Noted: 2020-01-06 | Resolved: 2024-01-03

## 2024-01-03 PROBLEM — O43.193 MARGINAL INSERTION OF UMBILICAL CORD AFFECTING MANAGEMENT OF MOTHER IN THIRD TRIMESTER: Status: RESOLVED | Noted: 2023-04-24 | Resolved: 2024-01-03

## 2024-01-03 PROBLEM — Z87.59 HISTORY OF PRE-ECLAMPSIA: Status: RESOLVED | Noted: 2023-04-24 | Resolved: 2024-01-03

## 2024-01-03 PROBLEM — O99.019 IRON DEFICIENCY ANEMIA DURING PREGNANCY: Status: RESOLVED | Noted: 2020-01-06 | Resolved: 2024-01-03

## 2024-06-22 ENCOUNTER — HEALTH MAINTENANCE LETTER (OUTPATIENT)
Age: 34
End: 2024-06-22

## 2024-11-05 ENCOUNTER — E-VISIT (OUTPATIENT)
Dept: FAMILY MEDICINE | Facility: CLINIC | Age: 34
End: 2024-11-05
Payer: COMMERCIAL

## 2024-11-05 DIAGNOSIS — M54.50 CHRONIC MIDLINE LOW BACK PAIN WITHOUT SCIATICA: Primary | ICD-10-CM

## 2024-11-05 DIAGNOSIS — G89.29 CHRONIC MIDLINE LOW BACK PAIN WITHOUT SCIATICA: Primary | ICD-10-CM

## 2024-11-05 PROCEDURE — 99422 OL DIG E/M SVC 11-20 MIN: CPT | Performed by: PHYSICIAN ASSISTANT

## 2024-11-05 RX ORDER — CYCLOBENZAPRINE HCL 10 MG
10 TABLET ORAL AT BEDTIME
Qty: 60 TABLET | Refills: 0 | Status: SHIPPED | OUTPATIENT
Start: 2024-11-05

## 2024-11-05 RX ORDER — PREDNISONE 20 MG/1
TABLET ORAL
Qty: 20 TABLET | Refills: 0 | Status: SHIPPED | OUTPATIENT
Start: 2024-11-05

## 2024-11-06 ENCOUNTER — HOSPITAL ENCOUNTER (OUTPATIENT)
Dept: GENERAL RADIOLOGY | Facility: CLINIC | Age: 34
Discharge: HOME | End: 2024-11-06
Attending: PHYSICIAN ASSISTANT | Admitting: PHYSICIAN ASSISTANT
Payer: COMMERCIAL

## 2024-11-06 DIAGNOSIS — G89.29 CHRONIC MIDLINE LOW BACK PAIN WITHOUT SCIATICA: ICD-10-CM

## 2024-11-06 DIAGNOSIS — M54.50 CHRONIC MIDLINE LOW BACK PAIN WITHOUT SCIATICA: ICD-10-CM

## 2024-11-06 PROCEDURE — 72100 X-RAY EXAM L-S SPINE 2/3 VWS: CPT

## 2024-11-06 PROCEDURE — 72070 X-RAY EXAM THORAC SPINE 2VWS: CPT

## 2025-01-22 ENCOUNTER — OFFICE VISIT (OUTPATIENT)
Dept: FAMILY MEDICINE | Facility: CLINIC | Age: 35
End: 2025-01-22
Payer: COMMERCIAL

## 2025-01-22 VITALS
DIASTOLIC BLOOD PRESSURE: 84 MMHG | BODY MASS INDEX: 29.15 KG/M2 | RESPIRATION RATE: 18 BRPM | OXYGEN SATURATION: 99 % | HEART RATE: 90 BPM | TEMPERATURE: 97.6 F | WEIGHT: 181.38 LBS | HEIGHT: 66 IN | SYSTOLIC BLOOD PRESSURE: 126 MMHG

## 2025-01-22 DIAGNOSIS — G89.29 CHRONIC MIDLINE LOW BACK PAIN WITHOUT SCIATICA: ICD-10-CM

## 2025-01-22 DIAGNOSIS — Z00.00 ROUTINE GENERAL MEDICAL EXAMINATION AT A HEALTH CARE FACILITY: Primary | ICD-10-CM

## 2025-01-22 DIAGNOSIS — M54.50 CHRONIC MIDLINE LOW BACK PAIN WITHOUT SCIATICA: ICD-10-CM

## 2025-01-22 DIAGNOSIS — Z82.79 FAMILY HISTORY OF CONGENITAL HEART DEFECT: ICD-10-CM

## 2025-01-22 DIAGNOSIS — F43.0 ACUTE REACTION TO STRESS: ICD-10-CM

## 2025-01-22 DIAGNOSIS — Z12.4 CERVICAL CANCER SCREENING: ICD-10-CM

## 2025-01-22 SDOH — HEALTH STABILITY: PHYSICAL HEALTH: ON AVERAGE, HOW MANY DAYS PER WEEK DO YOU ENGAGE IN MODERATE TO STRENUOUS EXERCISE (LIKE A BRISK WALK)?: 3 DAYS

## 2025-01-22 SDOH — HEALTH STABILITY: PHYSICAL HEALTH: ON AVERAGE, HOW MANY MINUTES DO YOU ENGAGE IN EXERCISE AT THIS LEVEL?: 30 MIN

## 2025-01-22 ASSESSMENT — SOCIAL DETERMINANTS OF HEALTH (SDOH): HOW OFTEN DO YOU GET TOGETHER WITH FRIENDS OR RELATIVES?: TWICE A WEEK

## 2025-01-22 ASSESSMENT — PAIN SCALES - GENERAL: PAINLEVEL_OUTOF10: NO PAIN (0)

## 2025-01-22 NOTE — PROGRESS NOTES
Preventive Care Visit  [facility]  Jerrica Hassan PA-C, Family Medicine  Jan 22, 2025        Jamin Kapadia is a 34 year old, presenting for the following:  Physical        1/22/2025     3:44 PM   Additional Questions   Roomed by Yenny GARCIA  Continues to have trouble with her low back. She reports this is OK with standing or even sitting but really bothersome with laying at night and makes it difficult to sleep. Pain is right in the center. No radiation of the pain. She tried muscle relaxants a few times but this did not help. Has had a normal xray.    She brings up today that her mother passed away at age 44 of a heart attack and was told that she had an enlarged heart. She does not recall her having any other health issues. She reports she did not complain much and she did have a hard upbringing but there was not any drug or alcohol use. She worries a lot about this potentially being hereditary especially with having 4 girls of her own now.    She also notes anxiety is often quite high. Just feels overwhelmed. She has considered medication but also very fearful of this. Worries she will not feel like herself or will feel numb. She does note there is a positive family history of anxiety and some of her siblings do take medication for this. There has also been a lot of family stress as her mother in law is dealing with ovarian cancer that has not become metastatic.     Deferring pap smear until next year.     Health Care Directive  Patient does not have a Health Care Directive: Discussed advance care planning with patient; however, patient declined at this time.      1/22/2025   General Health   How would you rate your overall physical health? Good   Feel stress (tense, anxious, or unable to sleep) To some extent   (!) STRESS CONCERN      1/22/2025   Nutrition   Three or more servings of calcium each day? Yes   Diet: Regular (no restrictions)   How many servings of  fruit and vegetables per day? (!) 2-3   How many sweetened beverages each day? 0-1         1/22/2025   Exercise   Days per week of moderate/strenous exercise 3 days   Average minutes spent exercising at this level 30 min         1/22/2025   Social Factors   Frequency of gathering with friends or relatives Twice a week   Worry food won't last until get money to buy more No   Food not last or not have enough money for food? No   Do you have housing? (Housing is defined as stable permanent housing and does not include staying ouside in a car, in a tent, in an abandoned building, in an overnight shelter, or couch-surfing.) Yes   Are you worried about losing your housing? No   Lack of transportation? No   Unable to get utilities (heat,electricity)? No         1/22/2025   Dental   Dentist two times every year? Yes         1/22/2025   TB Screening   Were you born outside of the [place]? No         Today's PHQ-2 Score:       1/22/2025     3:40 PM   PHQ-2 ( 1999 [employer])   Q1: Little interest or pleasure in doing things 0   Q2: Feeling down, depressed or hopeless 0   PHQ-2 Score 0    Q1: Little interest or pleasure in doing things Not at all   Q2: Feeling down, depressed or hopeless Not at all   PHQ-2 Score 0       Patient-reported           1/22/2025   Substance Use   Alcohol more than 3/day or more than 7/wk No   Do you use any other substances recreationally? No     Social History     Tobacco Use    Smoking status: Never    Smokeless tobacco: Never   Vaping Use    Vaping status: Never Used   Substance Use Topics    Alcohol use: No    Drug use: No          Mammogram Screening - Patient under 40 years of age: Routine Mammogram Screening not recommended.         1/22/2025   STI Screening   New sexual partner(s) since last STI/HIV test? No     History of abnormal Pap smear: No - age 30- 64 PAP with HPV every 5 years recommended        4/15/2022     2:30 PM 1/16/2018     1:15 PM 11/3/2014    12:00 AM   PAP / HPV   PAP Negative for  "Intraepithelial Lesion or Malignancy (NILM)      PAP (Historical)  NIL  NIL            1/22/2025   Contraception/Family Planning   Questions about contraception or family planning No        Reviewed and updated as needed this visit by Provider                    Review of Systems  Constitutional, HEENT, cardiovascular, pulmonary, GI, , musculoskeletal, neuro, skin, endocrine and psych systems are negative, except as otherwise noted.     Objective    Exam  /84   Pulse 90   Temp 97.6  F (36.4  C) (Temporal)   Resp 18   Ht 1.664 m (5' 5.5\")   Wt 82.3 kg (181 lb 6 oz)   LMP 12/30/2024 (Approximate)   SpO2 99%   BMI 29.72 kg/m     Estimated body mass index is 29.72 kg/m  as calculated from the following:    Height as of this encounter: 1.664 m (5' 5.5\").    Weight as of this encounter: 82.3 kg (181 lb 6 oz).    Physical Exam  GENERAL: alert and no distress  EYES: Eyes grossly normal to inspection, PERRL and conjunctivae and sclerae normal  HENT: ear canals and TM's normal, nose and mouth without ulcers or lesions  NECK: no adenopathy, no asymmetry, masses, or scars  RESP: lungs clear to auscultation - no rales, rhonchi or wheezes  CV: regular rate and rhythm, normal S1 S2, no S3 or S4, no murmur, click or rub, no peripheral edema  ABDOMEN: soft, nontender, no hepatosplenomegaly, no masses and bowel sounds normal  MS: no gross musculoskeletal defects noted, no edema  SKIN: no suspicious lesions or rashes  NEURO: Normal strength and tone, mentation intact and speech normal  PSYCH: mentation appears normal, affect normal/bright      Assessment & Plan     Routine general medical examination at a health care facility  Vaccinations reviewed and declined at this time.    Cervical cancer screening  Pap declined today. Will plan to complete at next physical.     Chronic midline low back pain without sciatica  Negative xray but having persisting symptoms despite supportive cares. Will have her start PT at this " "time. If still not improving then will proceed with MRI to further evaluate.   - Physical Therapy  Referral; Future    Family history of congenital heart defect  Given family history of mom's passing at age 44 I do think a stress echo is wise. Orders placed below.   - Echocardiogram Exercise Stress; Future    Acute reaction to stress  Anxiety discussed. Unsure if she wants to consider medication at this time. I did encourage her to talk with her siblings who are on anxiety medications and see what has worked for them. She will reach out if desires to start medication.     Patient has been advised of split billing requirements and indicates understanding: Yes    BMI  Estimated body mass index is 29.72 kg/m  as calculated from the following:    Height as of this encounter: 1.664 m (5' 5.5\").    Weight as of this encounter: 82.3 kg (181 lb 6 oz).   Weight management plan: Discussed healthy diet and exercise guidelines    Counseling  Appropriate preventive services were addressed with this patient via screening, questionnaire, or discussion as appropriate for fall prevention, nutrition, physical activity, Tobacco-use cessation, social engagement, weight loss and cognition.  Checklist reviewing preventive services available has been given to the patient.  Reviewed patient's diet, addressing concerns and/or questions.   She is at risk for lack of exercise and has been provided with information to increase physical activity for the benefit of her well-being.   She is at risk for psychosocial distress and has been provided with information to reduce risk.     Signed Electronically by: Jerrica Hassan PA-C    "

## 2025-07-26 DIAGNOSIS — F41.1 GAD (GENERALIZED ANXIETY DISORDER): ICD-10-CM

## 2025-07-28 RX ORDER — SERTRALINE HYDROCHLORIDE 100 MG/1
100 TABLET, FILM COATED ORAL DAILY
Qty: 90 TABLET | Refills: 0 | OUTPATIENT
Start: 2025-07-28